# Patient Record
Sex: MALE | Race: BLACK OR AFRICAN AMERICAN | Employment: OTHER | URBAN - METROPOLITAN AREA
[De-identification: names, ages, dates, MRNs, and addresses within clinical notes are randomized per-mention and may not be internally consistent; named-entity substitution may affect disease eponyms.]

---

## 2019-11-01 ENCOUNTER — OP HISTORICAL/CONVERTED ENCOUNTER (OUTPATIENT)
Dept: OTHER | Age: 39
End: 2019-11-01

## 2020-07-28 ENCOUNTER — OFFICE VISIT (OUTPATIENT)
Dept: ENT CLINIC | Age: 40
End: 2020-07-28
Payer: MEDICARE

## 2020-07-28 VITALS
HEART RATE: 68 BPM | TEMPERATURE: 98 F | BODY MASS INDEX: 20.89 KG/M2 | HEIGHT: 66 IN | RESPIRATION RATE: 18 BRPM | SYSTOLIC BLOOD PRESSURE: 128 MMHG | DIASTOLIC BLOOD PRESSURE: 80 MMHG | WEIGHT: 130 LBS | OXYGEN SATURATION: 97 %

## 2020-07-28 DIAGNOSIS — I15.1 HYPERTENSION SECONDARY TO OTHER RENAL DISORDERS: ICD-10-CM

## 2020-07-28 DIAGNOSIS — R04.0 EPISTAXIS: ICD-10-CM

## 2020-07-28 DIAGNOSIS — N17.9 AKI (ACUTE KIDNEY INJURY) (HCC): Primary | ICD-10-CM

## 2020-07-28 DIAGNOSIS — N28.89 HYPERTENSION SECONDARY TO OTHER RENAL DISORDERS: ICD-10-CM

## 2020-07-28 PROCEDURE — 99202 OFFICE O/P NEW SF 15 MIN: CPT | Performed by: OTOLARYNGOLOGY

## 2020-07-28 NOTE — PROGRESS NOTES
Subjective:  
 Donna Red 37 y.o.  
1980 HPI Chief Complaint New Patient (nose packing) Patient presents for evaluation of bleeding from the right nostril. Onset of symptoms was abrupt, with rapidly improving since that time. It has been episodic in nature. The bleeding started >24 hours ago. There is not history of prior nosebleeds. The patient denies any bleeding problems. The patient does not take ASA and/or anticoagulants. Packing placed by [facility] 2 days ago no bleeding since then. He is on HD. Review of Systems Review of Systems Constitutional: Positive for malaise/fatigue. Negative for chills and fever. HENT: Negative for ear pain, hearing loss, nosebleeds and tinnitus. Eyes: Negative for blurred vision and double vision. Respiratory: Negative for cough, sputum production and shortness of breath. Cardiovascular: Negative for chest pain and palpitations. Gastrointestinal: Negative for heartburn, nausea and vomiting. Musculoskeletal: Negative for joint pain and neck pain. Skin: Negative. Neurological: Negative for dizziness, speech change, weakness and headaches. Endo/Heme/Allergies: Negative for environmental allergies. Bruises/bleeds easily. Psychiatric/Behavioral: Negative for memory loss and substance abuse. The patient does not have insomnia. All other systems reviewed and are negative. Past Medical History:  
Diagnosis Date  Dialysis patient [facility])  Hypertension  Kidney damage Past Surgical History:  
Procedure Laterality Date  HX OTHER SURGICAL    
 kidney removed Family History Problem Relation Age of Onset  Hypertension Mother  Hypertension Father Social History Tobacco Use  Smoking status: Current Every Day Smoker Packs/day: 0.50 Years: 7.00 Pack years: 3.50  Smokeless tobacco: Never Used Substance Use Topics  Alcohol use: Yes Alcohol/week: 11.7 standard drinks Types: 14 Cans of beer per week Comment: 2 beers a day Current Outpatient Medications Medication Instructions  chlorthalidone (HYGROTEN) 25 mg, DAILY  cloNIDine HCL (CATAPRES) 0.3 mg, Oral, EVERY 8 HOURS  
 HYDROcodone-acetaminophen (NORCO) 5-325 mg per tablet 1 Tab, Oral, EVERY 8 HOURS AS NEEDED  
 labetaloL (NORMODYNE) 100 mg, Oral, EVERY 12 HOURS  lisinopriL (PRINIVIL, ZESTRIL) 20 mg, DAILY  NIFEdipine ER (PROCARDIA XL) 90 mg, DAILY  NIFEdipine ER (PROCARDIA XL) 90 mg, Oral, DAILY  spironolactone (ALDACTONE) 25 mg, Oral, 2 TIMES DAILY No Known Allergies Objective:  
 
Visit Vitals /80 (BP 1 Location: Left arm, BP Patient Position: Sitting) Pulse 68 Temp 98 °F (36.7 °C) (Oral) Resp 18 Ht 5' 6\" (1.676 m) Wt 130 lb (59 kg) SpO2 97% BMI 20.98 kg/m² Physical Exam 
Vitals signs reviewed. Constitutional:   
   Appearance: Normal appearance. He is normal weight. HENT:  
   Head: Normocephalic and atraumatic. Jaw: There is normal jaw occlusion. No trismus or malocclusion. Salivary Glands: Right salivary gland is not diffusely enlarged or tender. Left salivary gland is not diffusely enlarged or tender. Right Ear: Hearing, tympanic membrane, ear canal and external ear normal.  
   Left Ear: Hearing, tympanic membrane, ear canal and external ear normal.  
   Ears:  
   Etienne exam findings: does not lateralize. Right Rinne: AC > BC. Left Rinne: AC > BC. Nose: No nasal deformity or septal deviation. Right Nostril: Epistaxis (packing in place - removed; cottonball/afrin placed for mild ooze) present. Right Turbinates: Not enlarged. Left Turbinates: Not enlarged. Mouth/Throat:  
   Mouth: Mucous membranes are moist. No injury. Tongue: No lesions. Palate: No mass. Tonsils: No tonsillar exudate or tonsillar abscesses. Eyes:  
   Extraocular Movements: Extraocular movements intact. Pupils: Pupils are equal, round, and reactive to light. Neck: Musculoskeletal: Normal range of motion. No edema or erythema. Thyroid: No thyroid mass, thyromegaly or thyroid tenderness. Trachea: Trachea and phonation normal. No tracheal tenderness. Cardiovascular:  
   Rate and Rhythm: Normal rate and regular rhythm. Pulmonary:  
   Effort: Pulmonary effort is normal.  
   Breath sounds: Normal breath sounds. No stridor. No wheezing. Musculoskeletal: Normal range of motion. Lymphadenopathy:  
   Cervical: No cervical adenopathy. Skin: 
   General: Skin is warm and dry. Neurological:  
   General: No focal deficit present. Mental Status: He is alert and oriented to person, place, and time. Mental status is at baseline. Psychiatric:     
   Mood and Affect: Mood normal.     
   Behavior: Behavior normal.  
 
 
 
Assessment/Plan:  
 
Encounter Diagnoses Name Primary?  TUNDE (acute kidney injury) ([facility] [address] Yes  Hypertension secondary to other renal disorders  Epistaxis Packing removed - some ooze noted, controlled with Afrin on cottonball pt will remove @ home in 1-2 hours Nosebleed handout given Fu prn No orders of the defined types were placed in this encounter.

## 2021-01-01 ENCOUNTER — APPOINTMENT (OUTPATIENT)
Dept: ENDOSCOPY | Age: 41
End: 2021-01-01
Attending: INTERNAL MEDICINE
Payer: MEDICARE

## 2021-01-01 ENCOUNTER — HOSPITAL ENCOUNTER (OUTPATIENT)
Dept: PREADMISSION TESTING | Age: 41
Discharge: HOME | End: 2021-07-19
Payer: MEDICARE

## 2021-01-01 ENCOUNTER — APPOINTMENT (OUTPATIENT)
Dept: GENERAL RADIOLOGY | Age: 41
End: 2021-01-01
Attending: EMERGENCY MEDICINE
Payer: MEDICARE

## 2021-01-01 ENCOUNTER — HOSPITAL ENCOUNTER (OUTPATIENT)
Dept: MRI IMAGING | Age: 41
Discharge: HOME | End: 2021-10-26
Attending: INTERNAL MEDICINE
Payer: MEDICARE

## 2021-01-01 ENCOUNTER — HOSPITAL ENCOUNTER (EMERGENCY)
Age: 41
Discharge: HOME | End: 2021-07-03
Attending: EMERGENCY MEDICINE
Payer: MEDICARE

## 2021-01-01 ENCOUNTER — OFFICE VISIT (OUTPATIENT)
Dept: PRIMARY CARE CLINIC | Age: 41
End: 2021-01-01
Payer: MEDICARE

## 2021-01-01 ENCOUNTER — ANESTHESIA EVENT (OUTPATIENT)
Dept: ENDOSCOPY | Age: 41
End: 2021-01-01
Payer: MEDICARE

## 2021-01-01 ENCOUNTER — HOSPITAL ENCOUNTER (OUTPATIENT)
Age: 41
Setting detail: OBSERVATION
Discharge: LEFT BEFORE DISCHARGE | End: 2021-07-22
Attending: EMERGENCY MEDICINE | Admitting: INTERNAL MEDICINE
Payer: MEDICARE

## 2021-01-01 ENCOUNTER — HOSPITAL ENCOUNTER (EMERGENCY)
Age: 41
Discharge: HOME | End: 2021-07-15
Attending: EMERGENCY MEDICINE
Payer: MEDICARE

## 2021-01-01 ENCOUNTER — HOSPITAL ENCOUNTER (EMERGENCY)
Age: 41
Discharge: HOME | End: 2021-06-21
Attending: EMERGENCY MEDICINE
Payer: MEDICARE

## 2021-01-01 ENCOUNTER — HOSPITAL ENCOUNTER (OUTPATIENT)
Dept: PREADMISSION TESTING | Age: 41
Discharge: HOME | End: 2021-10-14
Payer: MEDICARE

## 2021-01-01 ENCOUNTER — PATIENT OUTREACH (OUTPATIENT)
Dept: CASE MANAGEMENT | Age: 41
End: 2021-01-01

## 2021-01-01 ENCOUNTER — HOSPITAL ENCOUNTER (OUTPATIENT)
Age: 41
Setting detail: OUTPATIENT SURGERY
Discharge: HOME | End: 2021-05-28
Attending: INTERNAL MEDICINE | Admitting: INTERNAL MEDICINE

## 2021-01-01 ENCOUNTER — TRANSCRIBE ORDER (OUTPATIENT)
Dept: SCHEDULING | Age: 41
End: 2021-01-01

## 2021-01-01 ENCOUNTER — HOSPITAL ENCOUNTER (OUTPATIENT)
Age: 41
Setting detail: OUTPATIENT SURGERY
Discharge: HOME | End: 2021-07-23
Attending: INTERNAL MEDICINE | Admitting: INTERNAL MEDICINE
Payer: MEDICARE

## 2021-01-01 ENCOUNTER — ANESTHESIA (OUTPATIENT)
Dept: ENDOSCOPY | Age: 41
End: 2021-01-01
Payer: MEDICARE

## 2021-01-01 ENCOUNTER — HOSPITAL ENCOUNTER (OUTPATIENT)
Age: 41
Setting detail: OBSERVATION
Discharge: LEFT BEFORE DISCHARGE | End: 2021-02-06
Attending: EMERGENCY MEDICINE | Admitting: INTERNAL MEDICINE
Payer: MEDICARE

## 2021-01-01 ENCOUNTER — TRANSCRIBE ORDER (OUTPATIENT)
Dept: REGISTRATION | Age: 41
End: 2021-01-01

## 2021-01-01 ENCOUNTER — HOSPITAL ENCOUNTER (OUTPATIENT)
Age: 41
Setting detail: OUTPATIENT SURGERY
Discharge: HOME | End: 2021-10-18
Attending: INTERNAL MEDICINE | Admitting: INTERNAL MEDICINE
Payer: MEDICARE

## 2021-01-01 ENCOUNTER — HOSPITAL ENCOUNTER (EMERGENCY)
Age: 41
Discharge: HOME | End: 2021-07-11
Attending: EMERGENCY MEDICINE
Payer: MEDICARE

## 2021-01-01 ENCOUNTER — HOSPITAL ENCOUNTER (OUTPATIENT)
Dept: PREADMISSION TESTING | Age: 41
Discharge: HOME | End: 2021-05-24
Payer: MEDICARE

## 2021-01-01 ENCOUNTER — APPOINTMENT (OUTPATIENT)
Dept: ENDOSCOPY | Age: 41
End: 2021-01-01
Attending: INTERNAL MEDICINE

## 2021-01-01 ENCOUNTER — HOSPITAL ENCOUNTER (OUTPATIENT)
Dept: LAB | Age: 41
Discharge: HOME | End: 2021-10-26
Attending: INTERNAL MEDICINE
Payer: MEDICARE

## 2021-01-01 VITALS
RESPIRATION RATE: 18 BRPM | TEMPERATURE: 97.6 F | HEIGHT: 68 IN | DIASTOLIC BLOOD PRESSURE: 116 MMHG | WEIGHT: 125.6 LBS | HEART RATE: 78 BPM | OXYGEN SATURATION: 99 % | SYSTOLIC BLOOD PRESSURE: 171 MMHG | BODY MASS INDEX: 19.04 KG/M2

## 2021-01-01 VITALS
SYSTOLIC BLOOD PRESSURE: 149 MMHG | OXYGEN SATURATION: 98 % | DIASTOLIC BLOOD PRESSURE: 102 MMHG | HEART RATE: 83 BPM | RESPIRATION RATE: 16 BRPM | TEMPERATURE: 98.2 F

## 2021-01-01 VITALS
BODY MASS INDEX: 23.32 KG/M2 | DIASTOLIC BLOOD PRESSURE: 109 MMHG | TEMPERATURE: 99.4 F | OXYGEN SATURATION: 100 % | HEART RATE: 103 BPM | SYSTOLIC BLOOD PRESSURE: 167 MMHG | HEIGHT: 65 IN | WEIGHT: 140 LBS | RESPIRATION RATE: 18 BRPM

## 2021-01-01 VITALS
SYSTOLIC BLOOD PRESSURE: 138 MMHG | OXYGEN SATURATION: 98 % | BODY MASS INDEX: 20.3 KG/M2 | HEART RATE: 73 BPM | WEIGHT: 122 LBS | RESPIRATION RATE: 18 BRPM | DIASTOLIC BLOOD PRESSURE: 87 MMHG | TEMPERATURE: 97.5 F

## 2021-01-01 VITALS
HEART RATE: 71 BPM | OXYGEN SATURATION: 100 % | RESPIRATION RATE: 18 BRPM | WEIGHT: 125.6 LBS | BODY MASS INDEX: 19.04 KG/M2 | TEMPERATURE: 97.3 F | DIASTOLIC BLOOD PRESSURE: 92 MMHG | HEIGHT: 68 IN | SYSTOLIC BLOOD PRESSURE: 149 MMHG

## 2021-01-01 VITALS
HEIGHT: 65 IN | OXYGEN SATURATION: 100 % | WEIGHT: 135 LBS | BODY MASS INDEX: 22.49 KG/M2 | HEART RATE: 73 BPM | RESPIRATION RATE: 18 BRPM | DIASTOLIC BLOOD PRESSURE: 89 MMHG | SYSTOLIC BLOOD PRESSURE: 123 MMHG | TEMPERATURE: 97.6 F

## 2021-01-01 VITALS
TEMPERATURE: 98.3 F | SYSTOLIC BLOOD PRESSURE: 135 MMHG | OXYGEN SATURATION: 98 % | HEART RATE: 78 BPM | RESPIRATION RATE: 18 BRPM | BODY MASS INDEX: 20.18 KG/M2 | WEIGHT: 125 LBS | DIASTOLIC BLOOD PRESSURE: 89 MMHG

## 2021-01-01 VITALS
SYSTOLIC BLOOD PRESSURE: 154 MMHG | DIASTOLIC BLOOD PRESSURE: 100 MMHG | OXYGEN SATURATION: 98 % | RESPIRATION RATE: 14 BRPM | TEMPERATURE: 97.2 F | WEIGHT: 122 LBS | HEART RATE: 63 BPM | BODY MASS INDEX: 19.61 KG/M2 | HEIGHT: 66 IN

## 2021-01-01 VITALS
RESPIRATION RATE: 18 BRPM | HEART RATE: 96 BPM | BODY MASS INDEX: 22.49 KG/M2 | DIASTOLIC BLOOD PRESSURE: 101 MMHG | TEMPERATURE: 99 F | OXYGEN SATURATION: 99 % | WEIGHT: 135 LBS | SYSTOLIC BLOOD PRESSURE: 149 MMHG | HEIGHT: 65 IN

## 2021-01-01 VITALS
RESPIRATION RATE: 16 BRPM | SYSTOLIC BLOOD PRESSURE: 131 MMHG | HEIGHT: 66 IN | HEART RATE: 77 BPM | WEIGHT: 124.5 LBS | DIASTOLIC BLOOD PRESSURE: 74 MMHG | OXYGEN SATURATION: 97 % | TEMPERATURE: 98 F | BODY MASS INDEX: 20.01 KG/M2

## 2021-01-01 VITALS
TEMPERATURE: 98.1 F | RESPIRATION RATE: 16 BRPM | SYSTOLIC BLOOD PRESSURE: 151 MMHG | DIASTOLIC BLOOD PRESSURE: 92 MMHG | OXYGEN SATURATION: 100 % | HEART RATE: 89 BPM

## 2021-01-01 DIAGNOSIS — C15.9 ESOPHAGEAL CANCER (HCC): Primary | ICD-10-CM

## 2021-01-01 DIAGNOSIS — R07.89 CHEST WALL PAIN: Primary | ICD-10-CM

## 2021-01-01 DIAGNOSIS — N18.6 ESRD (END STAGE RENAL DISEASE) ON DIALYSIS (HCC): ICD-10-CM

## 2021-01-01 DIAGNOSIS — K21.9 GASTROESOPHAGEAL REFLUX DISEASE WITHOUT ESOPHAGITIS: ICD-10-CM

## 2021-01-01 DIAGNOSIS — R10.13 EPIGASTRIC ABDOMINAL PAIN: ICD-10-CM

## 2021-01-01 DIAGNOSIS — I10 ESSENTIAL HYPERTENSION: ICD-10-CM

## 2021-01-01 DIAGNOSIS — C15.9: Primary | ICD-10-CM

## 2021-01-01 DIAGNOSIS — D64.9 SEVERE ANEMIA: Primary | ICD-10-CM

## 2021-01-01 DIAGNOSIS — N18.6 STAGE 5 CHRONIC KIDNEY DISEASE ON CHRONIC DIALYSIS (HCC): ICD-10-CM

## 2021-01-01 DIAGNOSIS — D64.9 ANEMIA, UNSPECIFIED TYPE: Primary | ICD-10-CM

## 2021-01-01 DIAGNOSIS — R10.13 ABDOMINAL PAIN, EPIGASTRIC: ICD-10-CM

## 2021-01-01 DIAGNOSIS — Z99.2 ESRD (END STAGE RENAL DISEASE) ON DIALYSIS (HCC): ICD-10-CM

## 2021-01-01 DIAGNOSIS — D64.9 SYMPTOMATIC ANEMIA: ICD-10-CM

## 2021-01-01 DIAGNOSIS — R11.2 NON-INTRACTABLE VOMITING WITH NAUSEA, UNSPECIFIED VOMITING TYPE: Primary | ICD-10-CM

## 2021-01-01 DIAGNOSIS — D49.0 TUMOR OF ESOPHAGUS: ICD-10-CM

## 2021-01-01 DIAGNOSIS — Z71.89 ACP (ADVANCE CARE PLANNING): ICD-10-CM

## 2021-01-01 DIAGNOSIS — C15.9 ESOPHAGEAL CANCER (HCC): ICD-10-CM

## 2021-01-01 DIAGNOSIS — R07.89 CHEST WALL PAIN: ICD-10-CM

## 2021-01-01 DIAGNOSIS — F17.200 TOBACCO DEPENDENCE: ICD-10-CM

## 2021-01-01 DIAGNOSIS — Z09 HOSPITAL DISCHARGE FOLLOW-UP: Primary | ICD-10-CM

## 2021-01-01 DIAGNOSIS — C15.9: ICD-10-CM

## 2021-01-01 DIAGNOSIS — Z00.00 MEDICARE ANNUAL WELLNESS VISIT, SUBSEQUENT: ICD-10-CM

## 2021-01-01 DIAGNOSIS — Z99.2 STAGE 5 CHRONIC KIDNEY DISEASE ON CHRONIC DIALYSIS (HCC): ICD-10-CM

## 2021-01-01 DIAGNOSIS — I10 ESSENTIAL HYPERTENSION: Primary | ICD-10-CM

## 2021-01-01 LAB
ABO + RH BLD: NORMAL
ALBUMIN SERPL-MCNC: 2.7 G/DL (ref 3.5–5)
ALBUMIN SERPL-MCNC: 2.9 G/DL (ref 3.5–5)
ALBUMIN SERPL-MCNC: 3.3 G/DL (ref 3.5–5)
ALBUMIN SERPL-MCNC: 3.3 G/DL (ref 3.5–5)
ALBUMIN SERPL-MCNC: 3.7 G/DL (ref 3.5–5)
ALBUMIN/GLOB SERPL: 0.9 {RATIO} (ref 1.1–2.2)
ALBUMIN/GLOB SERPL: 0.9 {RATIO} (ref 1.1–2.2)
ALBUMIN/GLOB SERPL: 1 {RATIO} (ref 1.1–2.2)
ALP SERPL-CCNC: 57 U/L (ref 45–117)
ALP SERPL-CCNC: 61 U/L (ref 45–117)
ALP SERPL-CCNC: 66 U/L (ref 45–117)
ALP SERPL-CCNC: 67 U/L (ref 45–117)
ALP SERPL-CCNC: 79 U/L (ref 45–117)
ALT SERPL-CCNC: 12 U/L (ref 12–78)
ALT SERPL-CCNC: 6 U/L (ref 12–78)
ALT SERPL-CCNC: 7 U/L (ref 12–78)
ALT SERPL-CCNC: 9 U/L (ref 12–78)
ALT SERPL-CCNC: <6 U/L (ref 12–78)
ANION GAP SERPL CALC-SCNC: 11 MMOL/L (ref 5–15)
ANION GAP SERPL CALC-SCNC: 13 MMOL/L (ref 5–15)
ANION GAP SERPL CALC-SCNC: 14 MMOL/L (ref 5–15)
ANION GAP SERPL CALC-SCNC: 6 MMOL/L (ref 5–15)
ANION GAP SERPL CALC-SCNC: 7 MMOL/L (ref 5–15)
ANION GAP SERPL CALC-SCNC: 8 MMOL/L (ref 5–15)
AST SERPL W P-5'-P-CCNC: 11 U/L (ref 15–37)
AST SERPL W P-5'-P-CCNC: 13 U/L (ref 15–37)
AST SERPL W P-5'-P-CCNC: 14 U/L (ref 15–37)
AST SERPL W P-5'-P-CCNC: 6 U/L (ref 15–37)
AST SERPL W P-5'-P-CCNC: 8 U/L (ref 15–37)
ATRIAL RATE: 104 BPM
ATRIAL RATE: 77 BPM
ATRIAL RATE: 78 BPM
ATRIAL RATE: 91 BPM
ATRIAL RATE: 96 BPM
BASOPHILS # BLD: 0 K/UL (ref 0–0.2)
BASOPHILS # BLD: 0 K/UL (ref 0–0.2)
BASOPHILS # BLD: 0.1 K/UL (ref 0–0.2)
BASOPHILS NFR BLD: 0 % (ref 0–2.5)
BASOPHILS NFR BLD: 1 % (ref 0–2.5)
BILIRUB SERPL-MCNC: 0.3 MG/DL (ref 0.2–1)
BILIRUB SERPL-MCNC: 0.4 MG/DL (ref 0.2–1)
BILIRUB SERPL-MCNC: 0.4 MG/DL (ref 0.2–1)
BILIRUB SERPL-MCNC: 0.5 MG/DL (ref 0.2–1)
BILIRUB SERPL-MCNC: 0.6 MG/DL (ref 0.2–1)
BLD PROD TYP BPU: NORMAL
BLOOD GROUP ANTIBODIES SERPL: NEGATIVE
BNP SERPL-MCNC: 3847 PG/ML
BNP SERPL-MCNC: 7293 PG/ML
BPU ID: NORMAL
BUN SERPL-MCNC: 23 MG/DL (ref 6–20)
BUN SERPL-MCNC: 38 MG/DL (ref 6–20)
BUN SERPL-MCNC: 44 MG/DL (ref 6–20)
BUN SERPL-MCNC: 45 MG/DL (ref 6–20)
BUN SERPL-MCNC: 62 MG/DL (ref 6–20)
BUN SERPL-MCNC: 71 MG/DL (ref 6–20)
BUN/CREAT SERPL: 3 (ref 12–20)
BUN/CREAT SERPL: 4 (ref 12–20)
CA-I BLD-MCNC: 8.7 MG/DL (ref 8.5–10.1)
CA-I BLD-MCNC: 8.7 MG/DL (ref 8.5–10.1)
CA-I BLD-MCNC: 9.1 MG/DL (ref 8.5–10.1)
CA-I BLD-MCNC: 9.6 MG/DL (ref 8.5–10.1)
CA-I BLD-MCNC: 9.7 MG/DL (ref 8.5–10.1)
CA-I BLD-MCNC: 9.8 MG/DL (ref 8.5–10.1)
CALCULATED P AXIS, ECG09: 32 DEGREES
CALCULATED P AXIS, ECG09: 34 DEGREES
CALCULATED P AXIS, ECG09: 37 DEGREES
CALCULATED P AXIS, ECG09: 55 DEGREES
CALCULATED P AXIS, ECG09: 64 DEGREES
CALCULATED R AXIS, ECG10: -13 DEGREES
CALCULATED R AXIS, ECG10: -18 DEGREES
CALCULATED R AXIS, ECG10: -19 DEGREES
CALCULATED R AXIS, ECG10: -3 DEGREES
CALCULATED R AXIS, ECG10: 18 DEGREES
CALCULATED T AXIS, ECG11: 56 DEGREES
CALCULATED T AXIS, ECG11: 58 DEGREES
CALCULATED T AXIS, ECG11: 67 DEGREES
CALCULATED T AXIS, ECG11: 75 DEGREES
CALCULATED T AXIS, ECG11: 87 DEGREES
CHLORIDE SERPL-SCNC: 100 MMOL/L (ref 97–108)
CHLORIDE SERPL-SCNC: 102 MMOL/L (ref 97–108)
CHLORIDE SERPL-SCNC: 95 MMOL/L (ref 97–108)
CHLORIDE SERPL-SCNC: 96 MMOL/L (ref 97–108)
CHLORIDE SERPL-SCNC: 96 MMOL/L (ref 97–108)
CHLORIDE SERPL-SCNC: 99 MMOL/L (ref 97–108)
CO2 SERPL-SCNC: 24 MMOL/L (ref 21–32)
CO2 SERPL-SCNC: 27 MMOL/L (ref 21–32)
CO2 SERPL-SCNC: 29 MMOL/L (ref 21–32)
CO2 SERPL-SCNC: 31 MMOL/L (ref 21–32)
CO2 SERPL-SCNC: 31 MMOL/L (ref 21–32)
CO2 SERPL-SCNC: 34 MMOL/L (ref 21–32)
COVID-19 RAPID TEST, COVR: ABNORMAL
CREAT SERPL-MCNC: 11.18 MG/DL (ref 0.7–1.3)
CREAT SERPL-MCNC: 12.59 MG/DL (ref 0.7–1.3)
CREAT SERPL-MCNC: 13.5 MG/DL (ref 0.7–1.3)
CREAT SERPL-MCNC: 16.29 MG/DL (ref 0.7–1.3)
CREAT SERPL-MCNC: 17.68 MG/DL (ref 0.7–1.3)
CREAT SERPL-MCNC: 8.57 MG/DL (ref 0.7–1.3)
CREAT SERPL-MCNC: 9.19 MG/DL (ref 0.7–1.3)
CROSSMATCH RESULT,%XM: NORMAL
DIAGNOSIS, 93000: NORMAL
EOSINOPHIL # BLD: 0 K/UL (ref 0–0.7)
EOSINOPHIL # BLD: 0.1 K/UL (ref 0–0.7)
EOSINOPHIL NFR BLD: 0 % (ref 0.9–2.9)
EOSINOPHIL NFR BLD: 0 % (ref 0.9–2.9)
EOSINOPHIL NFR BLD: 1 % (ref 0.9–2.9)
ERYTHROCYTE [DISTWIDTH] IN BLOOD BY AUTOMATED COUNT: 14.4 % (ref 11.5–14.5)
ERYTHROCYTE [DISTWIDTH] IN BLOOD BY AUTOMATED COUNT: 15.3 % (ref 11.5–14.5)
ERYTHROCYTE [DISTWIDTH] IN BLOOD BY AUTOMATED COUNT: 15.8 % (ref 11.5–14.5)
ERYTHROCYTE [DISTWIDTH] IN BLOOD BY AUTOMATED COUNT: 16.6 % (ref 11.5–14.5)
ERYTHROCYTE [DISTWIDTH] IN BLOOD BY AUTOMATED COUNT: 17 % (ref 11.5–14.5)
ERYTHROCYTE [DISTWIDTH] IN BLOOD BY AUTOMATED COUNT: 17.1 % (ref 11.5–14.5)
ERYTHROCYTE [DISTWIDTH] IN BLOOD BY AUTOMATED COUNT: 20.9 % (ref 11.5–14.5)
GLOBULIN SER CALC-MCNC: 3 G/DL (ref 2–4)
GLOBULIN SER CALC-MCNC: 3.4 G/DL (ref 2–4)
GLOBULIN SER CALC-MCNC: 3.8 G/DL (ref 2–4)
GLUCOSE SERPL-MCNC: 102 MG/DL (ref 65–100)
GLUCOSE SERPL-MCNC: 104 MG/DL (ref 65–100)
GLUCOSE SERPL-MCNC: 125 MG/DL (ref 65–100)
GLUCOSE SERPL-MCNC: 83 MG/DL (ref 65–100)
GLUCOSE SERPL-MCNC: 93 MG/DL (ref 65–100)
GLUCOSE SERPL-MCNC: 99 MG/DL (ref 65–100)
HCT VFR BLD AUTO: 16.5 % (ref 41–53)
HCT VFR BLD AUTO: 16.9 % (ref 41–53)
HCT VFR BLD AUTO: 18.2 % (ref 41–53)
HCT VFR BLD AUTO: 20.7 % (ref 41–53)
HCT VFR BLD AUTO: 21.9 % (ref 41–53)
HCT VFR BLD AUTO: 24.3 % (ref 41–53)
HCT VFR BLD AUTO: 26.9 % (ref 41–53)
HGB BLD-MCNC: 5.7 G/DL (ref 13.5–17.5)
HGB BLD-MCNC: 5.7 G/DL (ref 13–16)
HGB BLD-MCNC: 6.1 G/DL (ref 13.5–17.5)
HGB BLD-MCNC: 7.2 G/DL (ref 13.5–17.5)
HGB BLD-MCNC: 7.3 G/DL (ref 13.5–17.5)
HGB BLD-MCNC: 8.4 G/DL (ref 13.5–17.5)
HGB BLD-MCNC: 9.2 G/DL (ref 13.5–17.5)
INR PPP: 1.1 (ref 0.9–1.1)
INR PPP: 1.2 (ref 0.9–1.1)
LIPASE SERPL-CCNC: 124 U/L (ref 73–393)
LIPASE SERPL-CCNC: 161 U/L (ref 73–393)
LYMPHOCYTES # BLD: 0.6 K/UL (ref 1–4.8)
LYMPHOCYTES # BLD: 0.8 K/UL (ref 1–4.8)
LYMPHOCYTES # BLD: 1.2 K/UL (ref 1–4.8)
LYMPHOCYTES # BLD: 1.2 K/UL (ref 1–4.8)
LYMPHOCYTES # BLD: 1.5 K/UL (ref 1–4.8)
LYMPHOCYTES # BLD: 1.9 K/UL (ref 1–4.8)
LYMPHOCYTES NFR BLD: 14 % (ref 20.5–51.1)
LYMPHOCYTES NFR BLD: 15 % (ref 20.5–51.1)
LYMPHOCYTES NFR BLD: 20 % (ref 20.5–51.1)
LYMPHOCYTES NFR BLD: 28 % (ref 20.5–51.1)
LYMPHOCYTES NFR BLD: 32 % (ref 20.5–51.1)
LYMPHOCYTES NFR BLD: 8 % (ref 20.5–51.1)
MAGNESIUM SERPL-MCNC: 2.1 MG/DL (ref 1.6–2.4)
MAGNESIUM SERPL-MCNC: 2.7 MG/DL (ref 1.6–2.4)
MCH RBC QN AUTO: 31.7 PG (ref 31–34)
MCH RBC QN AUTO: 32 PG (ref 31–34)
MCH RBC QN AUTO: 32.8 PG (ref 31–34)
MCH RBC QN AUTO: 33.1 PG (ref 31–34)
MCH RBC QN AUTO: 33.3 PG (ref 31–34)
MCH RBC QN AUTO: 33.4 PG (ref 31–34)
MCH RBC QN AUTO: 33.7 PG (ref 31–34)
MCHC RBC AUTO-ENTMCNC: 33 G/DL (ref 31–36)
MCHC RBC AUTO-ENTMCNC: 33.5 G/DL (ref 31–36)
MCHC RBC AUTO-ENTMCNC: 33.8 G/DL (ref 31–36)
MCHC RBC AUTO-ENTMCNC: 34.3 G/DL (ref 31–36)
MCHC RBC AUTO-ENTMCNC: 34.6 G/DL (ref 31–36)
MCHC RBC AUTO-ENTMCNC: 34.7 G/DL (ref 31–36)
MCHC RBC AUTO-ENTMCNC: 35.4 G/DL (ref 31–36)
MCV RBC AUTO: 90.3 FL (ref 80–100)
MCV RBC AUTO: 96 FL (ref 80–100)
MCV RBC AUTO: 96 FL (ref 80–100)
MCV RBC AUTO: 97 FL (ref 80–100)
MCV RBC AUTO: 97.6 FL (ref 80–100)
MCV RBC AUTO: 97.7 FL (ref 80–100)
MCV RBC AUTO: 97.8 FL (ref 80–100)
MONOCYTES # BLD: 0.2 K/UL (ref 0.2–2.4)
MONOCYTES # BLD: 0.2 K/UL (ref 0–0.8)
MONOCYTES # BLD: 0.3 K/UL (ref 0.2–2.4)
MONOCYTES # BLD: 0.4 K/UL (ref 0.2–2.4)
MONOCYTES # BLD: 0.5 K/UL (ref 0.2–2.4)
MONOCYTES # BLD: 0.5 K/UL (ref 0.2–2.4)
MONOCYTES NFR BLD: 3 % (ref 1.7–9.3)
MONOCYTES NFR BLD: 4 % (ref 3.1–13.9)
MONOCYTES NFR BLD: 5 % (ref 1.7–9.3)
MONOCYTES NFR BLD: 7 % (ref 1.7–9.3)
MONOCYTES NFR BLD: 8 % (ref 1.7–9.3)
MONOCYTES NFR BLD: 8 % (ref 1.7–9.3)
NEUTS SEG # BLD: 3.3 K/UL (ref 1.8–7.7)
NEUTS SEG # BLD: 3.5 K/UL (ref 1.8–7.7)
NEUTS SEG # BLD: 4.8 K/UL (ref 1.8–7.7)
NEUTS SEG # BLD: 5 K/UL (ref 1.8–7.7)
NEUTS SEG # BLD: 5.8 K/UL (ref 1.8–7.7)
NEUTS SEG # BLD: 6.3 K/UL (ref 1.8–7.7)
NEUTS SEG NFR BLD: 58 % (ref 42–75)
NEUTS SEG NFR BLD: 62 % (ref 42–75)
NEUTS SEG NFR BLD: 75 % (ref 42–75)
NEUTS SEG NFR BLD: 76 % (ref 42–75)
NEUTS SEG NFR BLD: 80 % (ref 42–75)
NEUTS SEG NFR BLD: 88 % (ref 42–75)
NRBC # BLD: 0 K/UL
NRBC # BLD: 0.01 K/UL
NRBC # BLD: 0.01 K/UL
NRBC BLD-RTO: 0 PER 100 WBC
NRBC BLD-RTO: 0.1 PER 100 WBC
NRBC BLD-RTO: 0.1 PER 100 WBC
P-R INTERVAL, ECG05: 154 MS
P-R INTERVAL, ECG05: 159 MS
P-R INTERVAL, ECG05: 161 MS
P-R INTERVAL, ECG05: 164 MS
P-R INTERVAL, ECG05: 176 MS
PLATELET # BLD AUTO: 201 K/UL (ref 150–400)
PLATELET # BLD AUTO: 212 K/UL (ref 150–400)
PLATELET # BLD AUTO: 216 K/UL
PLATELET # BLD AUTO: 220 K/UL (ref 150–400)
PLATELET # BLD AUTO: 230 K/UL
PLATELET # BLD AUTO: 238 K/UL (ref 150–400)
PLATELET # BLD AUTO: 267 K/UL
PMV BLD AUTO: 7.3 FL (ref 6.5–11.5)
PMV BLD AUTO: 7.5 FL (ref 6.5–11.5)
PMV BLD AUTO: 7.7 FL (ref 6.5–11.5)
PMV BLD AUTO: 7.9 FL (ref 6.5–11.5)
PMV BLD AUTO: 8 FL (ref 6.5–11.5)
PMV BLD AUTO: 8.3 FL (ref 6.5–11.5)
PMV BLD AUTO: 8.3 FL (ref 6.5–11.5)
POTASSIUM SERPL-SCNC: 3.3 MMOL/L (ref 3.5–5.1)
POTASSIUM SERPL-SCNC: 3.4 MMOL/L (ref 3.5–5.1)
POTASSIUM SERPL-SCNC: 3.5 MMOL/L (ref 3.5–5.1)
POTASSIUM SERPL-SCNC: 3.7 MMOL/L (ref 3.5–5.1)
POTASSIUM SERPL-SCNC: 3.7 MMOL/L (ref 3.5–5.1)
POTASSIUM SERPL-SCNC: 4 MMOL/L (ref 3.5–5.1)
POTASSIUM SERPL-SCNC: 4 MMOL/L (ref 3.5–5.1)
POTASSIUM SERPL-SCNC: 4.5 MMOL/L (ref 3.5–5.1)
PROT SERPL-MCNC: 5.7 G/DL (ref 6.4–8.2)
PROT SERPL-MCNC: 6.3 G/DL (ref 6.4–8.2)
PROT SERPL-MCNC: 6.7 G/DL (ref 6.4–8.2)
PROT SERPL-MCNC: 6.7 G/DL (ref 6.4–8.2)
PROT SERPL-MCNC: 7.5 G/DL (ref 6.4–8.2)
PROTHROMBIN TIME: 11 SEC (ref 9–11.1)
PROTHROMBIN TIME: 11.4 SEC (ref 9–11.1)
Q-T INTERVAL, ECG07: 350 MS
Q-T INTERVAL, ECG07: 357 MS
Q-T INTERVAL, ECG07: 380 MS
Q-T INTERVAL, ECG07: 383 MS
Q-T INTERVAL, ECG07: 396 MS
QRS DURATION, ECG06: 86 MS
QRS DURATION, ECG06: 88 MS
QRS DURATION, ECG06: 88 MS
QRS DURATION, ECG06: 92 MS
QRS DURATION, ECG06: 93 MS
QTC CALCULATION (BEZET), ECG08: 434 MS
QTC CALCULATION (BEZET), ECG08: 452 MS
QTC CALCULATION (BEZET), ECG08: 452 MS
QTC CALCULATION (BEZET), ECG08: 461 MS
QTC CALCULATION (BEZET), ECG08: 468 MS
RBC # BLD AUTO: 1.69 M/UL
RBC # BLD AUTO: 1.73 M/UL (ref 4.5–5.9)
RBC # BLD AUTO: 1.86 M/UL (ref 4.5–5.9)
RBC # BLD AUTO: 2.28 M/UL (ref 4.5–5.9)
RBC # BLD AUTO: 2.29 M/UL (ref 4.5–5.9)
RBC # BLD AUTO: 2.53 M/UL (ref 4.5–5.9)
RBC # BLD AUTO: 2.78 M/UL (ref 4.5–5.9)
SARS-COV-2, COV2: NORMAL
SARS-COV-2, COV2NT: NOT DETECTED
SARS-COV-2, XPLCVT: NOT DETECTED
SODIUM SERPL-SCNC: 135 MMOL/L (ref 136–145)
SODIUM SERPL-SCNC: 136 MMOL/L (ref 136–145)
SODIUM SERPL-SCNC: 136 MMOL/L (ref 136–145)
SODIUM SERPL-SCNC: 137 MMOL/L (ref 136–145)
SODIUM SERPL-SCNC: 138 MMOL/L (ref 136–145)
SODIUM SERPL-SCNC: 141 MMOL/L (ref 136–145)
SOURCE, COVRS: NORMAL
SPECIMEN EXP DATE BLD: NORMAL
SPECIMEN SOURCE: ABNORMAL
STATUS OF UNIT,%ST: NORMAL
TRANSFUSION STATUS PATIENT QL: NORMAL
TROPONIN I SERPL-MCNC: <0.05 NG/ML
UNIT DIVISION, %UDIV: 0
VENTRICULAR RATE, ECG03: 104 BPM
VENTRICULAR RATE, ECG03: 77 BPM
VENTRICULAR RATE, ECG03: 78 BPM
VENTRICULAR RATE, ECG03: 91 BPM
VENTRICULAR RATE, ECG03: 96 BPM
WBC # BLD AUTO: 5.3 K/UL (ref 4.4–11.3)
WBC # BLD AUTO: 5.8 K/UL (ref 4.4–11.3)
WBC # BLD AUTO: 6 K/UL (ref 4.4–11.3)
WBC # BLD AUTO: 6.2 K/UL (ref 4.4–11.3)
WBC # BLD AUTO: 6.3 K/UL (ref 4.4–11.3)
WBC # BLD AUTO: 7.2 K/UL (ref 4.4–11.3)
WBC # BLD AUTO: 7.7 K/UL (ref 4.4–11.3)

## 2021-01-01 PROCEDURE — G8427 DOCREV CUR MEDS BY ELIG CLIN: HCPCS | Performed by: NURSE PRACTITIONER

## 2021-01-01 PROCEDURE — 99285 EMERGENCY DEPT VISIT HI MDM: CPT

## 2021-01-01 PROCEDURE — 80053 COMPREHEN METABOLIC PANEL: CPT

## 2021-01-01 PROCEDURE — 99218 HC RM OBSERVATION: CPT

## 2021-01-01 PROCEDURE — 74011250636 HC RX REV CODE- 250/636: Performed by: ANESTHESIOLOGY

## 2021-01-01 PROCEDURE — 85025 COMPLETE CBC W/AUTO DIFF WBC: CPT

## 2021-01-01 PROCEDURE — 84484 ASSAY OF TROPONIN QUANT: CPT

## 2021-01-01 PROCEDURE — G8420 CALC BMI NORM PARAMETERS: HCPCS | Performed by: NURSE PRACTITIONER

## 2021-01-01 PROCEDURE — 93005 ELECTROCARDIOGRAM TRACING: CPT

## 2021-01-01 PROCEDURE — P9016 RBC LEUKOCYTES REDUCED: HCPCS

## 2021-01-01 PROCEDURE — 85027 COMPLETE CBC AUTOMATED: CPT

## 2021-01-01 PROCEDURE — 74011250636 HC RX REV CODE- 250/636: Performed by: EMERGENCY MEDICINE

## 2021-01-01 PROCEDURE — 99283 EMERGENCY DEPT VISIT LOW MDM: CPT

## 2021-01-01 PROCEDURE — 83880 ASSAY OF NATRIURETIC PEPTIDE: CPT

## 2021-01-01 PROCEDURE — 76060000032 HC ANESTHESIA 0.5 TO 1 HR: Performed by: INTERNAL MEDICINE

## 2021-01-01 PROCEDURE — 99284 EMERGENCY DEPT VISIT MOD MDM: CPT

## 2021-01-01 PROCEDURE — 36415 COLL VENOUS BLD VENIPUNCTURE: CPT

## 2021-01-01 PROCEDURE — 71045 X-RAY EXAM CHEST 1 VIEW: CPT

## 2021-01-01 PROCEDURE — 96374 THER/PROPH/DIAG INJ IV PUSH: CPT

## 2021-01-01 PROCEDURE — 76040000007: Performed by: INTERNAL MEDICINE

## 2021-01-01 PROCEDURE — G8432 DEP SCR NOT DOC, RNG: HCPCS | Performed by: NURSE PRACTITIONER

## 2021-01-01 PROCEDURE — 74011250636 HC RX REV CODE- 250/636: Performed by: INTERNAL MEDICINE

## 2021-01-01 PROCEDURE — U0003 INFECTIOUS AGENT DETECTION BY NUCLEIC ACID (DNA OR RNA); SEVERE ACUTE RESPIRATORY SYNDROME CORONAVIRUS 2 (SARS-COV-2) (CORONAVIRUS DISEASE [COVID-19]), AMPLIFIED PROBE TECHNIQUE, MAKING USE OF HIGH THROUGHPUT TECHNOLOGIES AS DESCRIBED BY CMS-2020-01-R: HCPCS

## 2021-01-01 PROCEDURE — 99204 OFFICE O/P NEW MOD 45 MIN: CPT | Performed by: NURSE PRACTITIONER

## 2021-01-01 PROCEDURE — 86923 COMPATIBILITY TEST ELECTRIC: CPT

## 2021-01-01 PROCEDURE — 88305 TISSUE EXAM BY PATHOLOGIST: CPT

## 2021-01-01 PROCEDURE — 83735 ASSAY OF MAGNESIUM: CPT

## 2021-01-01 PROCEDURE — 74011250637 HC RX REV CODE- 250/637: Performed by: EMERGENCY MEDICINE

## 2021-01-01 PROCEDURE — 87635 SARS-COV-2 COVID-19 AMP PRB: CPT

## 2021-01-01 PROCEDURE — 36430 TRANSFUSION BLD/BLD COMPNT: CPT

## 2021-01-01 PROCEDURE — 77030019957 HC CUF BLN GASTSCP OCOA -B: Performed by: INTERNAL MEDICINE

## 2021-01-01 PROCEDURE — 74181 MRI ABDOMEN W/O CONTRAST: CPT

## 2021-01-01 PROCEDURE — 96361 HYDRATE IV INFUSION ADD-ON: CPT

## 2021-01-01 PROCEDURE — 86901 BLOOD TYPING SEROLOGIC RH(D): CPT

## 2021-01-01 PROCEDURE — G0439 PPPS, SUBSEQ VISIT: HCPCS | Performed by: NURSE PRACTITIONER

## 2021-01-01 PROCEDURE — 84132 ASSAY OF SERUM POTASSIUM: CPT

## 2021-01-01 PROCEDURE — 74011000250 HC RX REV CODE- 250: Performed by: ANESTHESIOLOGY

## 2021-01-01 PROCEDURE — 99214 OFFICE O/P EST MOD 30 MIN: CPT | Performed by: NURSE PRACTITIONER

## 2021-01-01 PROCEDURE — 82565 ASSAY OF CREATININE: CPT

## 2021-01-01 PROCEDURE — 1111F DSCHRG MED/CURRENT MED MERGE: CPT | Performed by: NURSE PRACTITIONER

## 2021-01-01 PROCEDURE — 2709999900 HC NON-CHARGEABLE SUPPLY: Performed by: INTERNAL MEDICINE

## 2021-01-01 PROCEDURE — G9231 DOC ESRD DIA TRANS PREG: HCPCS | Performed by: NURSE PRACTITIONER

## 2021-01-01 PROCEDURE — 85610 PROTHROMBIN TIME: CPT

## 2021-01-01 PROCEDURE — C9113 INJ PANTOPRAZOLE SODIUM, VIA: HCPCS | Performed by: EMERGENCY MEDICINE

## 2021-01-01 PROCEDURE — 80048 BASIC METABOLIC PNL TOTAL CA: CPT

## 2021-01-01 PROCEDURE — U0005 INFEC AGEN DETEC AMPLI PROBE: HCPCS

## 2021-01-01 PROCEDURE — 83690 ASSAY OF LIPASE: CPT

## 2021-01-01 PROCEDURE — 96376 TX/PRO/DX INJ SAME DRUG ADON: CPT

## 2021-01-01 PROCEDURE — 74011000250 HC RX REV CODE- 250: Performed by: EMERGENCY MEDICINE

## 2021-01-01 PROCEDURE — 96375 TX/PRO/DX INJ NEW DRUG ADDON: CPT

## 2021-01-01 PROCEDURE — 74011250637 HC RX REV CODE- 250/637: Performed by: INTERNAL MEDICINE

## 2021-01-01 RX ORDER — LIDOCAINE HYDROCHLORIDE 20 MG/ML
INJECTION, SOLUTION EPIDURAL; INFILTRATION; INTRACAUDAL; PERINEURAL
Status: COMPLETED
Start: 2021-01-01 | End: 2021-01-01

## 2021-01-01 RX ORDER — ACETAMINOPHEN 325 MG/1
650 TABLET ORAL
Status: DISCONTINUED | OUTPATIENT
Start: 2021-01-01 | End: 2021-01-01 | Stop reason: HOSPADM

## 2021-01-01 RX ORDER — PANTOPRAZOLE SODIUM 20 MG/1
20 TABLET, DELAYED RELEASE ORAL DAILY
Status: DISCONTINUED | OUTPATIENT
Start: 2021-01-01 | End: 2021-01-01 | Stop reason: HOSPADM

## 2021-01-01 RX ORDER — CLONIDINE HYDROCHLORIDE 0.1 MG/1
0.2 TABLET ORAL
Status: COMPLETED | OUTPATIENT
Start: 2021-01-01 | End: 2021-01-01

## 2021-01-01 RX ORDER — ACETAMINOPHEN 650 MG/1
650 SUPPOSITORY RECTAL
Status: DISCONTINUED | OUTPATIENT
Start: 2021-01-01 | End: 2021-01-01 | Stop reason: HOSPADM

## 2021-01-01 RX ORDER — METOPROLOL TARTRATE 50 MG/1
TABLET ORAL 2 TIMES DAILY
COMMUNITY

## 2021-01-01 RX ORDER — HYDROMORPHONE HYDROCHLORIDE 1 MG/ML
0.4 INJECTION, SOLUTION INTRAMUSCULAR; INTRAVENOUS; SUBCUTANEOUS
Status: CANCELLED | OUTPATIENT
Start: 2021-01-01

## 2021-01-01 RX ORDER — EPHEDRINE SULFATE/0.9% NACL/PF 50 MG/5 ML
5 SYRINGE (ML) INTRAVENOUS AS NEEDED
Status: CANCELLED | OUTPATIENT
Start: 2021-01-01

## 2021-01-01 RX ORDER — SODIUM CHLORIDE 0.9 % (FLUSH) 0.9 %
5-40 SYRINGE (ML) INJECTION EVERY 8 HOURS
Status: CANCELLED | OUTPATIENT
Start: 2021-01-01

## 2021-01-01 RX ORDER — HYDROCODONE BITARTRATE AND ACETAMINOPHEN 5; 325 MG/1; MG/1
1 TABLET ORAL
Qty: 12 TABLET | Refills: 0 | Status: SHIPPED | OUTPATIENT
Start: 2021-01-01 | End: 2021-01-01

## 2021-01-01 RX ORDER — PROPOFOL 10 MG/ML
INJECTION, EMULSION INTRAVENOUS
Status: COMPLETED
Start: 2021-01-01 | End: 2021-01-01

## 2021-01-01 RX ORDER — ONDANSETRON 4 MG/1
4 TABLET, ORALLY DISINTEGRATING ORAL
Status: DISCONTINUED | OUTPATIENT
Start: 2021-01-01 | End: 2021-01-01 | Stop reason: HOSPADM

## 2021-01-01 RX ORDER — CARVEDILOL 25 MG/1
TABLET ORAL
COMMUNITY
Start: 2021-01-01 | End: 2021-01-01 | Stop reason: ALTCHOICE

## 2021-01-01 RX ORDER — ONDANSETRON 4 MG/1
4 TABLET, ORALLY DISINTEGRATING ORAL
Status: DISCONTINUED | OUTPATIENT
Start: 2021-01-01 | End: 2021-01-01

## 2021-01-01 RX ORDER — IBUPROFEN 600 MG/1
600 TABLET ORAL
Status: COMPLETED | OUTPATIENT
Start: 2021-01-01 | End: 2021-01-01

## 2021-01-01 RX ORDER — SODIUM CHLORIDE 0.9 % (FLUSH) 0.9 %
5-40 SYRINGE (ML) INJECTION AS NEEDED
Status: DISCONTINUED | OUTPATIENT
Start: 2021-01-01 | End: 2021-01-01 | Stop reason: HOSPADM

## 2021-01-01 RX ORDER — SODIUM CHLORIDE 9 MG/ML
250 INJECTION, SOLUTION INTRAVENOUS AS NEEDED
Status: DISCONTINUED | OUTPATIENT
Start: 2021-01-01 | End: 2021-01-01 | Stop reason: HOSPADM

## 2021-01-01 RX ORDER — ONDANSETRON 4 MG/1
4 TABLET, ORALLY DISINTEGRATING ORAL
Qty: 20 TABLET | Refills: 0 | Status: ON HOLD | OUTPATIENT
Start: 2021-01-01 | End: 2021-01-01

## 2021-01-01 RX ORDER — MIDAZOLAM HYDROCHLORIDE 1 MG/ML
0.5 INJECTION, SOLUTION INTRAMUSCULAR; INTRAVENOUS
Status: CANCELLED | OUTPATIENT
Start: 2021-01-01

## 2021-01-01 RX ORDER — LISINOPRIL 20 MG/1
20 TABLET ORAL DAILY
Status: DISCONTINUED | OUTPATIENT
Start: 2021-01-01 | End: 2021-01-01 | Stop reason: HOSPADM

## 2021-01-01 RX ORDER — LIDOCAINE HYDROCHLORIDE 20 MG/ML
15 SOLUTION OROPHARYNGEAL AS NEEDED
Status: DISCONTINUED | OUTPATIENT
Start: 2021-01-01 | End: 2021-01-01 | Stop reason: HOSPADM

## 2021-01-01 RX ORDER — DOXAZOSIN 4 MG/1
4 TABLET ORAL
COMMUNITY

## 2021-01-01 RX ORDER — MAG HYDROX/ALUMINUM HYD/SIMETH 200-200-20
30 SUSPENSION, ORAL (FINAL DOSE FORM) ORAL
Status: DISCONTINUED | OUTPATIENT
Start: 2021-01-01 | End: 2021-01-01 | Stop reason: HOSPADM

## 2021-01-01 RX ORDER — LABETALOL HCL 20 MG/4 ML
40 SYRINGE (ML) INTRAVENOUS
Status: COMPLETED | OUTPATIENT
Start: 2021-01-01 | End: 2021-01-01

## 2021-01-01 RX ORDER — SODIUM CHLORIDE 9 MG/ML
25 INJECTION, SOLUTION INTRAVENOUS CONTINUOUS
Status: DISCONTINUED | OUTPATIENT
Start: 2021-01-01 | End: 2021-01-01 | Stop reason: HOSPADM

## 2021-01-01 RX ORDER — POLYETHYLENE GLYCOL 3350 17 G/17G
17 POWDER, FOR SOLUTION ORAL DAILY PRN
Status: DISCONTINUED | OUTPATIENT
Start: 2021-01-01 | End: 2021-01-01 | Stop reason: HOSPADM

## 2021-01-01 RX ORDER — ONDANSETRON 2 MG/ML
4 INJECTION INTRAMUSCULAR; INTRAVENOUS
Status: DISCONTINUED | OUTPATIENT
Start: 2021-01-01 | End: 2021-01-01 | Stop reason: HOSPADM

## 2021-01-01 RX ORDER — SODIUM CHLORIDE, SODIUM LACTATE, POTASSIUM CHLORIDE, CALCIUM CHLORIDE 600; 310; 30; 20 MG/100ML; MG/100ML; MG/100ML; MG/100ML
50 INJECTION, SOLUTION INTRAVENOUS CONTINUOUS
Status: DISCONTINUED | OUTPATIENT
Start: 2021-01-01 | End: 2021-01-01 | Stop reason: HOSPADM

## 2021-01-01 RX ORDER — HYDROCODONE BITARTRATE AND ACETAMINOPHEN 5; 325 MG/1; MG/1
1 TABLET ORAL AS NEEDED
Status: CANCELLED | OUTPATIENT
Start: 2021-01-01

## 2021-01-01 RX ORDER — FENTANYL CITRATE 50 UG/ML
50 INJECTION, SOLUTION INTRAMUSCULAR; INTRAVENOUS AS NEEDED
Status: CANCELLED | OUTPATIENT
Start: 2021-01-01

## 2021-01-01 RX ORDER — SODIUM CHLORIDE 0.9 % (FLUSH) 0.9 %
5-40 SYRINGE (ML) INJECTION EVERY 8 HOURS
Status: DISCONTINUED | OUTPATIENT
Start: 2021-01-01 | End: 2021-01-01 | Stop reason: HOSPADM

## 2021-01-01 RX ORDER — HYDRALAZINE HYDROCHLORIDE 100 MG/1
100 TABLET, FILM COATED ORAL 3 TIMES DAILY
COMMUNITY

## 2021-01-01 RX ORDER — PANTOPRAZOLE SODIUM 20 MG/1
40 TABLET, DELAYED RELEASE ORAL DAILY
COMMUNITY

## 2021-01-01 RX ORDER — IRBESARTAN 300 MG/1
300 TABLET ORAL DAILY
COMMUNITY

## 2021-01-01 RX ORDER — CLONIDINE HYDROCHLORIDE 0.1 MG/1
0.3 TABLET ORAL EVERY 8 HOURS
Status: DISCONTINUED | OUTPATIENT
Start: 2021-01-01 | End: 2021-01-01 | Stop reason: HOSPADM

## 2021-01-01 RX ORDER — LIDOCAINE HYDROCHLORIDE 10 MG/ML
0.1 INJECTION, SOLUTION EPIDURAL; INFILTRATION; INTRACAUDAL; PERINEURAL AS NEEDED
Status: CANCELLED | OUTPATIENT
Start: 2021-01-01

## 2021-01-01 RX ORDER — LABETALOL 100 MG/1
100 TABLET, FILM COATED ORAL EVERY 12 HOURS
Status: DISCONTINUED | OUTPATIENT
Start: 2021-01-01 | End: 2021-01-01 | Stop reason: HOSPADM

## 2021-01-01 RX ORDER — ONDANSETRON 2 MG/ML
4 INJECTION INTRAMUSCULAR; INTRAVENOUS ONCE
Status: COMPLETED | OUTPATIENT
Start: 2021-01-01 | End: 2021-01-01

## 2021-01-01 RX ORDER — SODIUM CHLORIDE, SODIUM LACTATE, POTASSIUM CHLORIDE, CALCIUM CHLORIDE 600; 310; 30; 20 MG/100ML; MG/100ML; MG/100ML; MG/100ML
75 INJECTION, SOLUTION INTRAVENOUS CONTINUOUS
Status: DISCONTINUED | OUTPATIENT
Start: 2021-01-01 | End: 2021-01-01 | Stop reason: HOSPADM

## 2021-01-01 RX ORDER — PROMETHAZINE HYDROCHLORIDE 25 MG/1
12.5 TABLET ORAL
Status: DISCONTINUED | OUTPATIENT
Start: 2021-01-01 | End: 2021-01-01 | Stop reason: HOSPADM

## 2021-01-01 RX ORDER — FENTANYL CITRATE 50 UG/ML
50 INJECTION, SOLUTION INTRAMUSCULAR; INTRAVENOUS
Status: CANCELLED | OUTPATIENT
Start: 2021-01-01

## 2021-01-01 RX ORDER — PROPOFOL 10 MG/ML
INJECTION, EMULSION INTRAVENOUS AS NEEDED
Status: DISCONTINUED | OUTPATIENT
Start: 2021-01-01 | End: 2021-01-01 | Stop reason: HOSPADM

## 2021-01-01 RX ORDER — MIDAZOLAM HYDROCHLORIDE 1 MG/ML
1 INJECTION, SOLUTION INTRAMUSCULAR; INTRAVENOUS AS NEEDED
Status: CANCELLED | OUTPATIENT
Start: 2021-01-01

## 2021-01-01 RX ORDER — NIFEDIPINE 30 MG/1
90 TABLET, EXTENDED RELEASE ORAL
Status: COMPLETED | OUTPATIENT
Start: 2021-01-01 | End: 2021-01-01

## 2021-01-01 RX ORDER — LABETALOL HCL 20 MG/4 ML
20 SYRINGE (ML) INTRAVENOUS
Status: COMPLETED | OUTPATIENT
Start: 2021-01-01 | End: 2021-01-01

## 2021-01-01 RX ORDER — ONDANSETRON 2 MG/ML
4 INJECTION INTRAMUSCULAR; INTRAVENOUS AS NEEDED
Status: CANCELLED | OUTPATIENT
Start: 2021-01-01

## 2021-01-01 RX ORDER — DIPHENHYDRAMINE HYDROCHLORIDE 50 MG/ML
12.5 INJECTION, SOLUTION INTRAMUSCULAR; INTRAVENOUS AS NEEDED
Status: CANCELLED | OUTPATIENT
Start: 2021-01-01 | End: 2021-01-01

## 2021-01-01 RX ORDER — GABAPENTIN 100 MG/1
200 CAPSULE ORAL
COMMUNITY

## 2021-01-01 RX ORDER — HYDROCODONE BITARTRATE AND ACETAMINOPHEN 5; 325 MG/1; MG/1
1 TABLET ORAL
Status: DISCONTINUED | OUTPATIENT
Start: 2021-01-01 | End: 2021-01-01 | Stop reason: HOSPADM

## 2021-01-01 RX ORDER — SEVELAMER HYDROCHLORIDE 800 MG/1
800 TABLET, FILM COATED ORAL
COMMUNITY

## 2021-01-01 RX ORDER — HEPARIN SODIUM 5000 [USP'U]/ML
5000 INJECTION, SOLUTION INTRAVENOUS; SUBCUTANEOUS EVERY 8 HOURS
Status: DISCONTINUED | OUTPATIENT
Start: 2021-01-01 | End: 2021-01-01 | Stop reason: HOSPADM

## 2021-01-01 RX ORDER — SODIUM CHLORIDE 0.9 % (FLUSH) 0.9 %
5-40 SYRINGE (ML) INJECTION AS NEEDED
Status: CANCELLED | OUTPATIENT
Start: 2021-01-01

## 2021-01-01 RX ORDER — LIDOCAINE HYDROCHLORIDE 20 MG/ML
INJECTION, SOLUTION EPIDURAL; INFILTRATION; INTRACAUDAL; PERINEURAL AS NEEDED
Status: DISCONTINUED | OUTPATIENT
Start: 2021-01-01 | End: 2021-01-01 | Stop reason: HOSPADM

## 2021-01-01 RX ORDER — CHLORTHALIDONE 25 MG/1
25 TABLET ORAL DAILY
Status: DISCONTINUED | OUTPATIENT
Start: 2021-01-01 | End: 2021-01-01 | Stop reason: HOSPADM

## 2021-01-01 RX ORDER — VERAPAMIL HYDROCHLORIDE 120 MG/1
120 TABLET, FILM COATED ORAL 2 TIMES DAILY
COMMUNITY

## 2021-01-01 RX ADMIN — SODIUM CHLORIDE: 9 INJECTION, SOLUTION INTRAVENOUS at 11:00

## 2021-01-01 RX ADMIN — PANTOPRAZOLE SODIUM 40 MG: 40 INJECTION, POWDER, FOR SOLUTION INTRAVENOUS at 17:25

## 2021-01-01 RX ADMIN — CLONIDINE HYDROCHLORIDE 0.2 MG: 0.1 TABLET ORAL at 01:16

## 2021-01-01 RX ADMIN — NIFEDIPINE 90 MG: 30 TABLET, EXTENDED RELEASE ORAL at 17:19

## 2021-01-01 RX ADMIN — PROPOFOL 50 MG: 10 INJECTION, EMULSION INTRAVENOUS at 11:26

## 2021-01-01 RX ADMIN — LABETALOL HYDROCHLORIDE 40 MG: 5 INJECTION, SOLUTION INTRAVENOUS at 00:18

## 2021-01-01 RX ADMIN — CLONIDINE HYDROCHLORIDE 0.2 MG: 0.1 TABLET ORAL at 17:18

## 2021-01-01 RX ADMIN — PROPOFOL 50 MG: 10 INJECTION, EMULSION INTRAVENOUS at 11:02

## 2021-01-01 RX ADMIN — PROPOFOL 50 MG: 10 INJECTION, EMULSION INTRAVENOUS at 11:36

## 2021-01-01 RX ADMIN — SODIUM CHLORIDE 25 ML/HR: 9 INJECTION, SOLUTION INTRAVENOUS at 09:39

## 2021-01-01 RX ADMIN — Medication 10 ML: at 23:43

## 2021-01-01 RX ADMIN — IBUPROFEN 600 MG: 600 TABLET, FILM COATED ORAL at 10:02

## 2021-01-01 RX ADMIN — PROPOFOL 50 MG: 10 INJECTION, EMULSION INTRAVENOUS at 11:00

## 2021-01-01 RX ADMIN — SODIUM CHLORIDE 25 ML/HR: 9 INJECTION, SOLUTION INTRAVENOUS at 08:24

## 2021-01-01 RX ADMIN — PROPOFOL 50 MG: 10 INJECTION, EMULSION INTRAVENOUS at 11:31

## 2021-01-01 RX ADMIN — Medication 10 ML: at 18:50

## 2021-01-01 RX ADMIN — PROPOFOL 50 MG: 10 INJECTION, EMULSION INTRAVENOUS at 11:30

## 2021-01-01 RX ADMIN — ONDANSETRON 4 MG: 2 INJECTION INTRAMUSCULAR; INTRAVENOUS at 05:38

## 2021-01-01 RX ADMIN — PROPOFOL 100 MG: 10 INJECTION, EMULSION INTRAVENOUS at 10:58

## 2021-01-01 RX ADMIN — LIDOCAINE HYDROCHLORIDE 100 MG: 20 INJECTION, SOLUTION EPIDURAL; INFILTRATION; INTRACAUDAL; PERINEURAL at 11:26

## 2021-01-01 RX ADMIN — Medication 10 ML: at 21:17

## 2021-01-01 RX ADMIN — LABETALOL HYDROCHLORIDE 20 MG: 5 INJECTION, SOLUTION INTRAVENOUS at 23:22

## 2021-01-01 RX ADMIN — ONDANSETRON 4 MG: 2 INJECTION INTRAMUSCULAR; INTRAVENOUS at 17:25

## 2021-01-01 RX ADMIN — PROPOFOL 50 MG: 10 INJECTION, EMULSION INTRAVENOUS at 11:34

## 2021-01-01 RX ADMIN — SODIUM CHLORIDE 250 ML: 9 INJECTION, SOLUTION INTRAVENOUS at 04:46

## 2021-01-01 RX ADMIN — PROPOFOL 50 MG: 10 INJECTION, EMULSION INTRAVENOUS at 11:29

## 2021-01-01 RX ADMIN — CLONIDINE HYDROCHLORIDE 0.3 MG: 0.1 TABLET ORAL at 21:15

## 2021-01-01 RX ADMIN — LABETALOL HCL 100 MG: 100 TABLET, FILM COATED ORAL at 21:15

## 2021-01-22 ENCOUNTER — TRANSCRIBE ORDER (OUTPATIENT)
Dept: LAB | Age: 41
End: 2021-01-22

## 2021-01-22 ENCOUNTER — HOSPITAL ENCOUNTER (OUTPATIENT)
Dept: LAB | Age: 41
Discharge: HOME | End: 2021-01-22
Payer: MEDICARE

## 2021-01-22 DIAGNOSIS — D64.9 ANEMIA: Primary | ICD-10-CM

## 2021-01-22 DIAGNOSIS — D64.9 ANEMIA: ICD-10-CM

## 2021-01-22 DIAGNOSIS — N18.6 ESRD (END STAGE RENAL DISEASE) (HCC): ICD-10-CM

## 2021-01-22 LAB
HCT VFR BLD AUTO: 22.9 % (ref 41–53)
HGB BLD-MCNC: 7.7 G/DL (ref 13.5–17.5)

## 2021-01-22 PROCEDURE — 36415 COLL VENOUS BLD VENIPUNCTURE: CPT

## 2021-01-22 PROCEDURE — 85014 HEMATOCRIT: CPT

## 2021-02-05 PROBLEM — N18.6 ESRD (END STAGE RENAL DISEASE) ON DIALYSIS (HCC): Status: ACTIVE | Noted: 2021-01-01

## 2021-02-05 PROBLEM — D64.9 ANEMIA: Status: ACTIVE | Noted: 2021-01-01

## 2021-02-05 PROBLEM — Z99.2 ESRD (END STAGE RENAL DISEASE) ON DIALYSIS (HCC): Status: ACTIVE | Noted: 2021-01-01

## 2021-02-05 PROBLEM — D64.9 SYMPTOMATIC ANEMIA: Status: ACTIVE | Noted: 2021-01-01

## 2021-02-05 NOTE — ED PROVIDER NOTES
EMERGENCY DEPARTMENT HISTORY AND PHYSICAL EXAM      Date: 2/5/2021  Patient Name: Tyrell Henning    History of Presenting Illness     Chief Complaint   Patient presents with    Irregular Heart Beat     pt presents with palpitations, states ongoing for two days. States that they get worse with movement, pt also says that he has become dizzy. Pt pmh of HTN, dialysis. Pt ambulatory to room w/o issue       History Provided By: Patient    HPI: Tyrell Henning, 41 y.o. male with a past medical history significant hypertension and Hemodialysis presents to the ED with cc of palpitations at 0100 hours, has not had an episode since that time; patient denies chest pain no shortness of breath but patient did have some dizziness; at the time of the palpitations patient was making his bed; There are no other complaints, changes, or physical findings at this time. PCP: None    No current facility-administered medications on file prior to encounter. Current Outpatient Medications on File Prior to Encounter   Medication Sig Dispense Refill    cloNIDine (CATAPRES) 0.3 mg tablet Take 1 Tab by mouth every eight (8) hours. 21 Tab 0    labetalol (NORMODYNE) 100 mg tablet Take 1 Tab by mouth every twelve (12) hours. 14 Tab 0    NIFEdipine ER (PROCARDIA XL) 90 mg ER tablet Take 1 Tab by mouth daily. 7 Tab 0    spironolactone (ALDACTONE) 25 mg tablet Take 1 Tab by mouth two (2) times a day. 14 Tab 0    HYDROcodone-acetaminophen (NORCO) 5-325 mg per tablet Take 1 Tab by mouth every eight (8) hours as needed for Pain. 14 Tab 0    lisinopril (PRINIVIL, ZESTRIL) 20 mg tablet Take 20 mg by mouth daily.  chlorthalidone (HYGROTEN) 25 mg tablet Take 25 mg by mouth daily.  NIFEdipine ER (PROCARDIA XL) 90 mg ER tablet Take 90 mg by mouth daily.          Past History     Past Medical History:  Past Medical History:   Diagnosis Date    Dialysis patient ([facility] [address]     Hypertension     Kidney damage        Past Surgical History:  Past Surgical History:   Procedure Laterality Date    HX OTHER SURGICAL      kidney removed       Family History:  Family History   Problem Relation Age of Onset    Hypertension Mother     Hypertension Father        Social History:  Social History     Tobacco Use    Smoking status: Current Every Day Smoker     Packs/day: 0.50     Years: 7.00     Pack years: 3.50    Smokeless tobacco: Never Used   Substance Use Topics    Alcohol use: Yes     Alcohol/week: 11.7 standard drinks     Types: 14 Cans of beer per week     Comment: 2 beers a day    Drug use: No       Allergies:  No Known Allergies      Review of Systems     Review of Systems   Constitutional: Positive for fatigue. Negative for chills, diaphoresis and fever. HENT: Negative for rhinorrhea and sore throat. Eyes: Negative for photophobia and discharge. Respiratory: Negative for chest tightness and shortness of breath. Cardiovascular: Positive for palpitations. Gastrointestinal: Negative for abdominal pain and vomiting. Endocrine: Negative for polydipsia and polyuria. Genitourinary: Negative for dysuria and urgency. Musculoskeletal: Negative for back pain and neck pain. Skin: Negative for color change and pallor. Neurological: Positive for dizziness. Negative for weakness and numbness. Psychiatric/Behavioral: Negative. Physical Exam     Physical Exam  Vitals signs and nursing note reviewed. Constitutional:       Appearance: Normal appearance. HENT:      Head: Normocephalic and atraumatic. Mouth/Throat:      Mouth: Mucous membranes are moist.      Pharynx: Oropharynx is clear. Eyes:      Extraocular Movements: Extraocular movements intact. Conjunctiva/sclera: Conjunctivae normal.      Pupils: Pupils are equal, round, and reactive to light. Neck:      Musculoskeletal: Normal range of motion and neck supple. Cardiovascular:      Rate and Rhythm: Normal rate and regular rhythm.       Pulses: Normal pulses. Heart sounds: Normal heart sounds. Pulmonary:      Effort: Pulmonary effort is normal.      Breath sounds: Normal breath sounds. Abdominal:      General: Bowel sounds are normal.      Palpations: Abdomen is soft. Musculoskeletal: Normal range of motion. Skin:     General: Skin is warm. Capillary Refill: Capillary refill takes less than 2 seconds. Neurological:      General: No focal deficit present. Mental Status: He is alert. Psychiatric:         Mood and Affect: Mood normal.         Behavior: Behavior normal.         Lab and Diagnostic Study Results     Labs -   No results found for this or any previous visit (from the past 12 hour(s)). Radiologic Studies -   @lastxrresult@  CT Results  (Last 48 hours)    None        CXR Results  (Last 48 hours)    None            Medical Decision Making   - I am the first provider for this patient. - I reviewed the vital signs, available nursing notes, past medical history, past surgical history, family history and social history. - Initial assessment performed. The patients presenting problems have been discussed, and they are in agreement with the care plan formulated and outlined with them. I have encouraged them to ask questions as they arise throughout their visit. Vital Signs-Reviewed the patient's vital signs.   Patient Vitals for the past 12 hrs:   Temp Pulse Resp BP SpO2   02/05/21 1550 98.5 °F (36.9 °C) 79 19 118/84 99 %       Records Reviewed: Nursing Notes    The patient presents with palpitations with a differential diagnosis of cardiac dysrhythmia, metabolic imbalance, pneumonia      ED Course:     ED Course as of Feb 05 1845 Fri Feb 05, 2021   1632 EKG rate 78 sinus rhythm VA interval 176 QT interval 452 left ventricular hypertrophy no ST elevations or depressions    [SB]   1658 Case discussed with Dr. Eva Echols    [SB]      ED Course User Index  [SB] Rosangela Hall MD       Provider Notes (Medical Decision Making): MDM       Procedures   Medical Decision Makingedical Decision Making  Performed by: Lazaro Singleton MD  PROCEDURES:  Procedures       Disposition   Disposition: Condition stable  Admitted to Floor Medical Floor the case was discussed with the admitting physician nurse practitioner Arianne Adorno        DISCHARGE PLAN:  1. Current Discharge Medication List      CONTINUE these medications which have NOT CHANGED    Details   cloNIDine (CATAPRES) 0.3 mg tablet Take 1 Tab by mouth every eight (8) hours. Qty: 21 Tab, Refills: 0    Comments: Per Dr. Berenice Martinez (Nephrology)      labetalol (NORMODYNE) 100 mg tablet Take 1 Tab by mouth every twelve (12) hours. Qty: 14 Tab, Refills: 0    Comments: Per Dr. Berenice Martinez (Nephrology)      !! NIFEdipine ER (PROCARDIA XL) 90 mg ER tablet Take 1 Tab by mouth daily. Qty: 7 Tab, Refills: 0    Comments: Per Dr. Berenice Martinez (Nephrology)      spironolactone (ALDACTONE) 25 mg tablet Take 1 Tab by mouth two (2) times a day. Qty: 14 Tab, Refills: 0    Comments: Per Dr. Berenice Martinez (Nephrology)      HYDROcodone-acetaminophen [facility]) 5-325 mg per tablet Take 1 Tab by mouth every eight (8) hours as needed for Pain. Qty: 14 Tab, Refills: 0      lisinopril (PRINIVIL, ZESTRIL) 20 mg tablet Take 20 mg by mouth daily. chlorthalidone (HYGROTEN) 25 mg tablet Take 25 mg by mouth daily. !! NIFEdipine ER (PROCARDIA XL) 90 mg ER tablet Take 90 mg by mouth daily. !! - Potential duplicate medications found. Please discuss with provider. 2.   Follow-up Information    None       3. Return to ED if worse   4. Current Discharge Medication List            Diagnosis     Clinical Impression: No diagnosis found. Attestations:    Lazaro Singleton MD    Please note that this dictation was completed with [employer], the [employer] voice recognition software.   Quite often unanticipated grammatical, syntax, homophones, and other interpretive errors are inadvertently transcribed by the computer software. Please disregard these errors. Please excuse any errors that have escaped final proofreading. Thank you.

## 2021-02-05 NOTE — ED NOTES
Type and Screen drawn and sent, pt tolerated well. In NAD and VS stable.  Pt has no needs at this time will continue to monitor

## 2021-02-05 NOTE — ED NOTES
Attempted to call report on pt at [phone], pt in NAD, eating food that was brought by wife. Pt VS stable, in NAD.  Will continue to monitor

## 2021-02-05 NOTE — H&P
History and Physical    Subjective:     Samia Madison is a 41 y.o. male  has a past medical history of Dialysis patient [facility]), Hypertension, and Kidney damage. Presents to ED with isolated episode of palpitations and weakness. Evaluated in the ED and found to have low H&H and scheduled for HD Saturday. Case reviewed with Neph on call who asked for transfusion and will follow. Admit observation and do HD than discharge. Past Medical History:   Diagnosis Date    Dialysis patient ([facility] [address]     Hypertension     Kidney damage       Past Surgical History:   Procedure Laterality Date    HX OTHER SURGICAL      kidney removed     Family History   Problem Relation Age of Onset    Hypertension Mother     Hypertension Father       Social History     Tobacco Use    Smoking status: Current Every Day Smoker     Packs/day: 0.50     Years: 7.00     Pack years: 3.50    Smokeless tobacco: Never Used   Substance Use Topics    Alcohol use: Yes     Alcohol/week: 11.7 standard drinks     Types: 14 Cans of beer per week     Comment: 2 beers a day       Prior to Admission medications    Medication Sig Start Date End Date Taking? Authorizing Provider   cloNIDine (CATAPRES) 0.3 mg tablet Take 1 Tab by mouth every eight (8) hours. 3/5/14   Elidia Villanueva PA-C   labetalol (NORMODYNE) 100 mg tablet Take 1 Tab by mouth every twelve (12) hours. 3/5/14   Elidia Villanueva PA-C   NIFEdipine ER (PROCARDIA XL) 90 mg ER tablet Take 1 Tab by mouth daily. 3/5/14   Elidia Villanueva PA-C   spironolactone (ALDACTONE) 25 mg tablet Take 1 Tab by mouth two (2) times a day. 3/5/14   Elidia Villanueva PA-C   HYDROcodone-acetaminophen (NORCO) 5-325 mg per tablet Take 1 Tab by mouth every eight (8) hours as needed for Pain. 3/5/14   Elidia Villanueva PA-C   lisinopril (PRINIVIL, ZESTRIL) 20 mg tablet Take 20 mg by mouth daily. Provider, Historical   chlorthalidone (HYGROTEN) 25 mg tablet Take 25 mg by mouth daily.     Provider, Historical NIFEdipine ER (PROCARDIA XL) 90 mg ER tablet Take 90 mg by mouth daily. Provider, Historical     No Known Allergies       REVIEW OF SYSTEMS:       Total of 12 systems reviewed as follows:       POSITIVE= underlined text  Negative = text not underlined  General:  fever, chills, sweats, generalized weakness, weight loss/gain,      loss of appetite   Eyes:    blurred vision, eye pain, loss of vision, double vision  ENT:    rhinorrhea, pharyngitis   Respiratory:  cough, sputum production, SOB, PEÑA, wheezing, pleuritic pain   Cardiology:   chest pain, palpitations, orthopnea, PND, edema, syncope   Gastrointestinal:  abdominal pain , N/V, diarrhea, dysphagia, constipation, bleeding   Genitourinary:  frequency, urgency, dysuria, hematuria, incontinence   Muskuloskeletal :  arthralgia, myalgia, back pain  Hematology: easy bruising, nose or gum bleeding, lymphadenopathy   Dermatological: rash, ulceration, pruritis, color change / jaundice  Endocrine:   hot flashes or polydipsia   Neurological:  headache, dizziness, confusion, focal weakness, paresthesia,     Speech difficulties, memory loss, gait difficulty  Psychological: Feelings of anxiety, depression, agitation       Objective:   VITALS:    Visit Vitals  /68   Pulse 83   Temp 98.5 °F (36.9 °C)   Resp 16   Ht 5' 5\" (1.651 m)   Wt 61.2 kg (135 lb)   SpO2 100%   BMI 22.47 kg/m²       PHYSICAL EXAM:    General:    Alert, cooperative, no distress, appears stated age. HEENT: Atraumatic, anicteric sclerae, pink conjunctivae     No oral ulcers, mucosa moist, throat clear, dentition fair  Neck:  Supple, symmetrical,  thyroid: non tender  Lungs:   Clear to auscultation bilaterally. No Wheezing or Rhonchi. No rales. Chest wall:  No tenderness  No Accessory muscle use. Heart:   Regular  rhythm,  No  murmur   No edema  Abdomen:   Soft, non-tender. Not distended. Bowel sounds normal  Extremities: No cyanosis.   No clubbing,      Skin turgor normal, Capillary refill normal, Radial dial pulse 2+  Skin:     Not pale. Not Jaundiced  No rashes   Psych:  Fair insight. Not depressed. Not anxious or agitated. Neurologic: EOMs intact. No facial asymmetry. No aphasia or slurred speech. Symmetrical strength,   Sensation grossly intact. Alert and oriented X 4.     _______________________________________________________________________  Care Plan discussed with:    Comments   Patient X    Family      RN X    Care Manager                    Consultant:      _______________________________________________________________________  Expected  Disposition:   Home with Family X   HH/PT/OT/RN    SNF/LTC    KAVITHA    ________________________________________________________________________  TOTAL TIME:  48 Minutes    Critical Care Provided     Minutes non procedure based      Comments    X Reviewed previous records   >50% of visit spent in counseling and coordination of care X Discussion with patient and/or family and questions answered       ________________________________________________________________________    Labs:  Recent Results (from the past 24 hour(s))   CBC WITH AUTOMATED DIFF    Collection Time: 02/05/21  4:11 PM   Result Value Ref Range    WBC 6.0 4.4 - 11.3 K/uL    RBC 1.86 (L) 4.50 - 5.90 M/uL    HGB 6.1 (L) 13.5 - 17.5 g/dL    HCT 18.2 (L) 41 - 53 %    MCV 97.8 80 - 100 FL    MCH 32.8 31 - 34 PG    MCHC 33.5 31.0 - 36.0 g/dL    RDW 17.0 (H) 11.5 - 14.5 %    PLATELET 702 K/uL    MPV 7.5 6.5 - 11.5 FL    NRBC 0.1  WBC    ABSOLUTE NRBC 0.01 K/uL    NEUTROPHILS 58 42 - 75 %    LYMPHOCYTES 32 20.5 - 51.1 %    MONOCYTES 8 1.7 - 9.3 %    EOSINOPHILS 1 0.9 - 2.9 %    BASOPHILS 1 0.0 - 2.5 %    ABS. NEUTROPHILS 3.5 1.8 - 7.7 K/UL    ABS. LYMPHOCYTES 1.9 1.0 - 4.8 K/UL    ABS. MONOCYTES 0.5 0.2 - 2.4 K/UL    ABS. EOSINOPHILS 0.0 0.0 - 0.7 K/UL    ABS.  BASOPHILS 0.1 0.0 - 0.2 K/UL   PROTHROMBIN TIME + INR    Collection Time: 02/05/21  4:11 PM   Result Value Ref Range    Prothrombin time 11.4 (H) 9.0 - 11.1 sec    INR 1.2 (H) 0.9 - 1.1     METABOLIC PANEL, COMPREHENSIVE    Collection Time: 02/05/21  4:11 PM   Result Value Ref Range    Sodium 136 136 - 145 mmol/L    Potassium 3.5 3.5 - 5.1 mmol/L    Chloride 96 (L) 97 - 108 mmol/L    CO2 34 (H) 21 - 32 mmol/L    Anion gap 6 5 - 15 mmol/L    Glucose 104 (H) 65 - 100 mg/dL    BUN 38 (H) 6 - 20 mg/dL    Creatinine 11.18 (H) 0.70 - 1.30 mg/dL    BUN/Creatinine ratio 3 (L) 12 - 20      GFR est AA 6 (L) >60 ml/min/1.73m2    GFR est non-AA 5 (L) >60 ml/min/1.73m2    Calcium 9.6 8.5 - 10.1 mg/dL    Bilirubin, total 0.5 0.2 - 1.0 mg/dL    AST (SGOT) 13 (L) 15 - 37 U/L    ALT (SGPT) 9 (L) 12 - 78 U/L    Alk. phosphatase 79 45 - 117 U/L    Protein, total 6.7 6.4 - 8.2 g/dL    Albumin 3.3 (L) 3.5 - 5.0 g/dL    Globulin 3.4 2.0 - 4.0 g/dL    A-G Ratio 1.0 (L) 1.1 - 2.2     MAGNESIUM    Collection Time: 02/05/21  4:11 PM   Result Value Ref Range    Magnesium 2.1 1.6 - 2.4 mg/dL       Imaging:  No results found.      Assessment & Plan:       Symptomatic anemia  -type and screen 1 unit PRBC  -Monitor H&H/CBC post    ESRD (end stage renal disease) on dialysis ([facility] [address]  -Neph contacted and following  -HD T/Th/Sat  -Perform HD Saturday per Neph    Hypertension  -BP controlled on admission  -Continue home meds   -Verify medications for reconciliation         Code Status: Full    Prophylaxis: Lovenox 40 mg subcu daily     Electronically Signed : Octavio Titus, PhD, PA-C, Hospital Medicine Service

## 2021-02-05 NOTE — ED TRIAGE NOTES
.  Chief Complaint   Patient presents with    Irregular Heart Beat     pt presents with palpitations, states ongoing for two days. States that they get worse with movement, pt also says that he has become dizzy. Pt pmh of HTN, dialysis.  Pt ambulatory to room w/o issue

## 2021-02-06 NOTE — DISCHARGE SUMMARY
Discharge Summary    42 yo HD patient was admitted with symptomatic anemia a few hours ago. He received 1 unit of prbcs then decided to leave AMA before 7am.  He is due for HD today and was encouraged not to miss it.     Discharge Diagnoses:  Symptomatic anemia  ESRD  Hypertension          Greg Monzon MD

## 2021-02-06 NOTE — ED NOTES
TRANSFER - OUT REPORT:    Verbal report given to Kirk Kidd on [place] [facility]  being transferred to [id number] for routine progression of care       Report consisted of patients Situation, Background, Assessment and   Recommendations(SBAR). Information from the following report(s) SBAR was reviewed with the receiving nurse. Opportunity for questions and clarification was provided.       Patient transported with:   Registered Nurse

## 2021-02-06 NOTE — PROGRESS NOTES
Pt arrived from ED via stretcher, blood obtained and transfusion started, blood currently transfusing, this RN stayed with pt for first 20 minutes of transfusion, no transfusion reaction noted, vital signs remained stable, admission questions completed, pt denies any further needs at this time, will continue to monitor

## 2021-02-06 NOTE — PROGRESS NOTES
Pt signed out AMA, on call provider Efrain Sanchez MD made aware and instructed this writer to educated the pt on the importance of getting his HDU session today as he is a T,TH,Sat HDU pt and has excess fluid d/t receiving 1 unit of PRBC last night. Pt verbalizes an understanding and says he will be going to dialysis outpatient as scheduled today. Pt called for his own transportation home, PIV was removed.  Pt denies any questions or concerns, AMA paper signed, witnessed and placed on pt's chart

## 2021-02-06 NOTE — ED NOTES
The patient is lying on the bed in left lateral recumbent position with his eyes closed and he appears to be resting. NAD noted at this time.

## 2021-02-06 NOTE — ED NOTES
Patient care and bedside report received from [facility]. The patient is sitting up in bed talking to staff. The patient is alert and oriented X 4 with even rise and fall of the chest noted. The patient has family at the bedside. Catie Travis

## 2021-05-28 NOTE — PROGRESS NOTES
Pt /116mmHg pt reports not taking his blood pressure medication today, anaesthesia Dr Kelly Draper made aware, no orders received, case to be rescheduled to a further date. Pt advised to take his medication prior to the procedure next time

## 2021-06-21 NOTE — ED TRIAGE NOTES
Pt reports abdominal pain and vomiting that started this morning. Pt also voices c/o left chest pain. Pt denies any other symptoms.

## 2021-06-21 NOTE — ED PROVIDER NOTES
EMERGENCY DEPARTMENT HISTORY AND PHYSICAL EXAM      Date: 6/21/2021  Patient Name: Kesha Peters      History of Presenting Illness     Chief Complaint   Patient presents with    Abdominal Pain    Vomiting    Chest Pain       History Provided By: Patient    HPI: Kesha Peters, 41 y.o. male with a past medical history significant for end-stage renal disease on hemodialysis hypertension, renal insufficiency and GERD and multiple other medical problems listed in the old record and below presents to the ED with cc of upper abdominal pain and vomiting. This is been ongoing intermittent problem for over 2 months now but much worse over the last 24 hours patient was scheduled to have a EGD approximately 6 weeks ago canceled due to uncontrolled hypertension he has been unable to keep his medications down today due to vomiting small amount of blood in emesis last hemodialysis was 3 days ago and scheduled for tomorrow morning he denies shortness of breath said minor chest discomfort on the right side earlier today no fever no significant GI blood loss. There are no other complaints, changes, or physical findings at this time. PCP: None    Current Outpatient Medications   Medication Sig Dispense Refill    cloNIDine (CATAPRES) 0.3 mg tablet Take 1 Tab by mouth every eight (8) hours. (Patient not taking: Reported on 5/28/2021) 21 Tab 0    labetalol (NORMODYNE) 100 mg tablet Take 1 Tab by mouth every twelve (12) hours. 14 Tab 0    NIFEdipine ER (PROCARDIA XL) 90 mg ER tablet Take 1 Tab by mouth daily. (Patient not taking: Reported on 5/28/2021) 7 Tab 0    spironolactone (ALDACTONE) 25 mg tablet Take 1 Tab by mouth two (2) times a day. (Patient not taking: Reported on 5/28/2021) 14 Tab 0    HYDROcodone-acetaminophen (NORCO) 5-325 mg per tablet Take 1 Tab by mouth every eight (8) hours as needed for Pain.  (Patient not taking: Reported on 5/28/2021) 14 Tab 0    lisinopril (PRINIVIL, ZESTRIL) 20 mg tablet Take 20 mg by mouth daily. (Patient not taking: Reported on 5/28/2021)      chlorthalidone (HYGROTEN) 25 mg tablet Take 25 mg by mouth daily. (Patient not taking: Reported on 5/28/2021)      NIFEdipine ER (PROCARDIA XL) 90 mg ER tablet Take 90 mg by mouth daily. (Patient not taking: Reported on 5/28/2021)         Past History     Past Medical History:  Past Medical History:   Diagnosis Date    Chronic kidney disease     dialysis [facility]    Dialysis patient ([address]     GERD (gastroesophageal reflux disease)     Hypertension     Kidney damage        Past Surgical History:  Past Surgical History:   Procedure Laterality Date    HX OTHER SURGICAL      kidney removed       Family History:  Family History   Problem Relation Age of Onset    Hypertension Mother     Hypertension Father        Social History:  Social History     Tobacco Use    Smoking status: Current Every Day Smoker     Packs/day: 0.50     Years: 7.00     Pack years: 3.50    Smokeless tobacco: Never Used   Substance Use Topics    Alcohol use: Yes     Alcohol/week: 11.7 standard drinks     Types: 14 Cans of beer per week     Comment: 2 beers a day    Drug use: No       Allergies:  No Known Allergies      Review of Systems   Review of all other systems negative  Review of Systems    Physical Exam   Thin middle-aged AA male mildly uncomfortable no acute distress  Physical Exam  Vitals and nursing note reviewed. Constitutional:       General: He is not in acute distress. Appearance: Normal appearance. He is well-developed and normal weight. He is not ill-appearing or toxic-appearing. HENT:      Head: Normocephalic and atraumatic. Nose: Nose normal.      Mouth/Throat:      Mouth: Mucous membranes are moist.   Eyes:      Extraocular Movements: Extraocular movements intact. Conjunctiva/sclera: Conjunctivae normal.   Neck:      Vascular: No carotid bruit. Cardiovascular:      Rate and Rhythm: Normal rate and regular rhythm.       Pulses: Normal pulses. Heart sounds: Normal heart sounds. Pulmonary:      Effort: Pulmonary effort is normal. No respiratory distress. Breath sounds: Normal breath sounds. No wheezing, rhonchi or rales. Abdominal:      General: Abdomen is flat. There is no distension. Palpations: Abdomen is soft. There is no mass. Tenderness: There is abdominal tenderness in the epigastric area. There is no right CVA tenderness, left CVA tenderness, guarding or rebound. Hernia: No hernia is present. Musculoskeletal:         General: Normal range of motion. Cervical back: Normal range of motion and neck supple. No rigidity. No muscular tenderness. Skin:     General: Skin is warm and dry. Neurological:      General: No focal deficit present. Mental Status: He is alert and oriented to person, place, and time. Mental status is at baseline. Psychiatric:         Mood and Affect: Mood normal.         Behavior: Behavior normal.         Thought Content:  Thought content normal.         Lab and Diagnostic Study Results     Labs -     Recent Results (from the past 12 hour(s))   EKG, 12 LEAD, INITIAL    Collection Time: 06/21/21  4:07 PM   Result Value Ref Range    Ventricular Rate 77 BPM    Atrial Rate 77 BPM    P-R Interval 159 ms    QRS Duration 92 ms    Q-T Interval 383 ms    QTC Calculation (Bezet) 434 ms    Calculated P Axis 32 degrees    Calculated R Axis -3 degrees    Calculated T Axis 58 degrees    Diagnosis       Sinus rhythm  Left ventricular hypertrophy  Baseline wander in lead(s) V1     CBC WITH AUTOMATED DIFF    Collection Time: 06/21/21  5:30 PM   Result Value Ref Range    WBC 6.2 4.4 - 11.3 K/uL    RBC 2.78 (L) 4.50 - 5.90 M/uL    HGB 9.2 (L) 13.5 - 17.5 g/dL    HCT 26.9 (L) 41 - 53 %    MCV 97.0 80 - 100 FL    MCH 33.3 31 - 34 PG    MCHC 34.3 31.0 - 36.0 g/dL    RDW 15.3 (H) 11.5 - 14.5 %    PLATELET 450 417 - 247 K/uL    MPV 8.3 6.5 - 11.5 FL    NRBC 0.0  WBC    ABSOLUTE NRBC 0.00 K/uL    NEUTROPHILS 80 (H) 42 - 75 %    LYMPHOCYTES 14 (L) 20.5 - 51.1 %    MONOCYTES 5 1.7 - 9.3 %    EOSINOPHILS 0 (L) 0.9 - 2.9 %    BASOPHILS 1 0.0 - 2.5 %    ABS. NEUTROPHILS 5.0 1.8 - 7.7 K/UL    ABS. LYMPHOCYTES 0.8 (L) 1.0 - 4.8 K/UL    ABS. MONOCYTES 0.3 0.2 - 2.4 K/UL    ABS. EOSINOPHILS 0.0 0.0 - 0.7 K/UL    ABS. BASOPHILS 0.1 0.0 - 0.2 K/UL   METABOLIC PANEL, COMPREHENSIVE    Collection Time: 06/21/21  5:30 PM   Result Value Ref Range    Sodium 136 136 - 145 mmol/L    Potassium 3.4 (L) 3.5 - 5.1 mmol/L    Chloride 96 (L) 97 - 108 mmol/L    CO2 29 21 - 32 mmol/L    Anion gap 11 5 - 15 mmol/L    Glucose 99 65 - 100 mg/dL    BUN 44 (H) 6 - 20 mg/dL    Creatinine 13.50 (H) 0.70 - 1.30 mg/dL    BUN/Creatinine ratio 3 (L) 12 - 20      GFR est AA 5 (L) >60 ml/min/1.73m2    GFR est non-AA 4 (L) >60 ml/min/1.73m2    Calcium 9.8 8.5 - 10.1 mg/dL    Bilirubin, total 0.6 0.2 - 1.0 mg/dL    AST (SGOT) 14 (L) 15 - 37 U/L    ALT (SGPT) 12 12 - 78 U/L    Alk. phosphatase 67 45 - 117 U/L    Protein, total 7.5 6.4 - 8.2 g/dL    Albumin 3.7 3.5 - 5.0 g/dL    Globulin 3.8 2.0 - 4.0 g/dL    A-G Ratio 1.0 (L) 1.1 - 2.2     TROPONIN I    Collection Time: 06/21/21  5:30 PM   Result Value Ref Range    Troponin-I, Qt. <0.05 <0.05 ng/mL   BNP    Collection Time: 06/21/21  5:30 PM   Result Value Ref Range    NT pro-BNP 3,847 (H) <125 pg/mL       Radiologic Studies -   [unfilled]  CT Results  (Last 48 hours)    None        CXR Results  (Last 48 hours)               06/21/21 1715  XR CHEST PORT Final result    Impression:  No acute findings. Narrative:  Frontal radiograph of the chest 5:12 PM compared with December 19, 2015. INDICATION: Chest pain and vomiting. Heart size appears within normal limits. Patient has taken a better depth of   inspiration. No infiltrate or effusion. Bones appear intact.                  Medical Decision Making and ED Course   - I am the first and primary provider for this patient AND AM THE PRIMARY PROVIDER OF RECORD. - I reviewed the vital signs, available nursing notes, past medical history, past surgical history, family history and social history. - Initial assessment performed. The patients presenting problems have been discussed, and the staff are in agreement with the care plan formulated and outlined with them. I have encouraged them to ask questions as they arise throughout their visit. Vital Signs-Reviewed the patient's vital signs. Patient Vitals for the past 12 hrs:   Temp Pulse Resp BP SpO2   06/21/21 1835 -- 79 18 (!) 159/109 98 %   06/21/21 1640 -- -- -- (!) 191/116 --   06/21/21 1610 98.2 °F (36.8 °C) -- -- -- --   06/21/21 1609 -- 75 17 (!) 214/125 97 %       EKG interpretation: (Preliminary): Performed at 1607, and read at 1608  Rhythm: normal sinus rhythm; and regular . Rate (approx.): 77; Axis: normal; NJ interval: normal; QRS interval: normal ; ST/T wave: normal; Other findings: LVH. Records Reviewed: Nursing Notes and Old Medical Records    The patient presents with abdominal pain with a differential diagnosis of abdominal pain, appendicitis, biliary colic, cholecystitis, diverticulitis, gastritis, gastroenteritis, GERD, pancreatitis, PUD, renal Colic, and vomiting    ED Course:              Provider Notes (Medical Decision Making):   68-year-old male presents emergency department with upper abdominal pain and vomiting this is been ongoing problem for several months intermittently though worse today. Markedly elevated blood pressure due to inability to take antihypertensives this morning this is improved with oral meds antiemetics. Feeling better after meds repeat exam minimal discomfort with deep palpation of the upper abdomen.   Labs are unremarkable options discussed with patient prefers discharge or follow-up with his doctor tomorrow  MDM           Consultations:       Consultations: - NONE        Procedures and Critical Care       Performed by: Scooter Valera Antoine Pastrana MD  PROCEDURES:  Procedures                 Disposition     Disposition: Condition stable and improved  DC- Adult Discharges: All of the diagnostic tests were reviewed and questions answered. Diagnosis, care plan and treatment options were discussed. The patient understands the instructions and will follow up as directed. The patients results have been reviewed with them. They have been counseled regarding their diagnosis. The patient verbally convey understanding and agreement of the signs, symptoms, diagnosis, treatment and prognosis and additionally agrees to follow up as recommended with their PCP in 24 - 48 hours. They also agree with the care-plan and convey that all of their questions have been answered. I have also put together some discharge instructions for them that include: 1) educational information regarding their diagnosis, 2) how to care for their diagnosis at home, as well a 3) list of reasons why they would want to return to the ED prior to their follow-up appointment, should their condition change. Remove if not discharged  DISCHARGE PLAN:  1. Current Discharge Medication List      CONTINUE these medications which have NOT CHANGED    Details   cloNIDine (CATAPRES) 0.3 mg tablet Take 1 Tab by mouth every eight (8) hours. Qty: 21 Tab, Refills: 0    Comments: Per Dr. Amarilys Encinas (Nephrology)      labetalol (NORMODYNE) 100 mg tablet Take 1 Tab by mouth every twelve (12) hours. Qty: 14 Tab, Refills: 0    Comments: Per Dr. Amarilys Encinas (Nephrology)      !! NIFEdipine ER (PROCARDIA XL) 90 mg ER tablet Take 1 Tab by mouth daily. Qty: 7 Tab, Refills: 0    Comments: Per Dr. Amarilys Encinas (Nephrology)      spironolactone (ALDACTONE) 25 mg tablet Take 1 Tab by mouth two (2) times a day. Qty: 14 Tab, Refills: 0    Comments: Per Dr. Amarilys Encinas (Nephrology)      HYDROcodone-acetaminophen [facility]) 5-325 mg per tablet Take 1 Tab by mouth every eight (8) hours as needed for Pain.   Qty: 14 Tab, Refills: 0      lisinopril (PRINIVIL, ZESTRIL) 20 mg tablet Take 20 mg by mouth daily. chlorthalidone (HYGROTEN) 25 mg tablet Take 25 mg by mouth daily. !! NIFEdipine ER (PROCARDIA XL) 90 mg ER tablet Take 90 mg by mouth daily. !! - Potential duplicate medications found. Please discuss with provider. 2.   Follow-up Information    None       3. Return to ED if worse   4. Current Discharge Medication List          Diagnosis     Clinical Impression: Vomiting upper abdominal pain  Attestations:    Yonathan Camarillo MD    Please note that this dictation was completed with [employer], the computer voice recognition software. Quite often unanticipated grammatical, syntax, homophones, and other interpretive errors are inadvertently transcribed by the computer software. Please disregard these errors. Please excuse any errors that have escaped final proofreading. Thank you.

## 2021-07-03 PROBLEM — R10.13 ABDOMINAL PAIN, EPIGASTRIC: Status: ACTIVE | Noted: 2021-01-01

## 2021-07-03 NOTE — ED TRIAGE NOTES
Pt states that he has a hx of GERD and take protonix but it is not helping. Pt states that he has been having these pains for months. He has an appointment with the GI doctor later this month, Pt points to the pain in his LUQ. Pt states that he has been throwing up for the past three day and states he has seen blood in his vomitus.

## 2021-07-03 NOTE — ED PROVIDER NOTES
EMERGENCY DEPARTMENT HISTORY AND PHYSICAL EXAM  ?    Date: 7/2/2021  Patient Name: Edilberto Vail    History of Presenting Illness    Patient presents with:  Abdominal Pain      History Provided By: Patient    HPI: Edilberto Vail, 41 y.o. male with a past medical history significant for hypertension and dialysis presents to the ED with cc of epigastric abdominal pain for several months. Pain is constant. He is scheduled to have endoscopy later this month. He is taking Protonix without relief. There are no other complaints, changes, or physical findings at this time. PCP: None    Current Facility-Administered Medications:  lidocaine (XYLOCAINE) 2 % viscous solution 15 mL, 15 mL, Mouth/Throat, PRN, Jasmin Delacruz MD  alum-mag hydroxide-simeth (MYLANTA) oral suspension 30 mL, 30 mL, Oral, Q4H PRN, Jasmin MD    Current Outpatient Medications:  pantoprazole (PROTONIX) 20 mg tablet, Take 20 mg by mouth daily. , Disp: , Rfl:   ondansetron (Zofran ODT) 4 mg disintegrating tablet, 1 Tablet by SubLINGual route every eight (8) hours as needed for Nausea or Vomiting., Disp: 20 Tablet, Rfl: 0  cloNIDine (CATAPRES) 0.3 mg tablet, Take 1 Tab by mouth every eight (8) hours. (Patient not taking: Reported on 5/28/2021), Disp: 21 Tab, Rfl: 0  labetalol (NORMODYNE) 100 mg tablet, Take 1 Tab by mouth every twelve (12) hours. , Disp: 14 Tab, Rfl: 0  NIFEdipine ER (PROCARDIA XL) 90 mg ER tablet, Take 1 Tab by mouth daily. (Patient not taking: Reported on 5/28/2021), Disp: 7 Tab, Rfl: 0  spironolactone (ALDACTONE) 25 mg tablet, Take 1 Tab by mouth two (2) times a day. (Patient not taking: Reported on 5/28/2021), Disp: 14 Tab, Rfl: 0  HYDROcodone-acetaminophen (NORCO) 5-325 mg per tablet, Take 1 Tab by mouth every eight (8) hours as needed for Pain. (Patient not taking: Reported on 5/28/2021), Disp: 14 Tab, Rfl: 0  lisinopril (PRINIVIL, ZESTRIL) 20 mg tablet, Take 20 mg by mouth daily.  (Patient not taking: Reported on 5/28/2021), Disp: , Rfl:   chlorthalidone (HYGROTEN) 25 mg tablet, Take 25 mg by mouth daily. (Patient not taking: Reported on 5/28/2021), Disp: , Rfl:   NIFEdipine ER (PROCARDIA XL) 90 mg ER tablet, Take 90 mg by mouth daily. (Patient not taking: Reported on 5/28/2021), Disp: , Rfl:         Past History    Past Medical History:  Past Medical History:  No date: Chronic kidney disease     Comment:  dialysis shantelle chatterjee, sat  No date: Dialysis patient ([facility] [address]  No date: GERD (gastroesophageal reflux disease)  No date: Hypertension  No date: Kidney damage    Past Surgical History:  Past Surgical History:  No date: HX OTHER SURGICAL     Comment:  kidney removed    Family History:  Review of patient's family history indicates:  Problem: Hypertension     Relation: Mother         Age of Onset: (Not Specified)  Problem: Hypertension     Relation: Father         Age of Onset: (Not Specified)      Social History:  Social History   Tobacco Use     Smoking status: Current Every Day Smoker       Packs/day: 0.50       Years: 7.00       Pack years: 3.5     Smokeless tobacco: Never Used   Alcohol use: Yes     Alcohol/week: 11.7 standard drinks     Types: 14 Cans of beer per week     Comment: 2 beers a day   Drug use: No      Allergies:  No Known Allergies      Review of Systems  @[facility]@    Physical Exam  [unfilled]    Diagnostic Study Results    Labs -  No results found for this or any previous visit (from the past 12 hour(s)). Radiologic Studies -   No orders to display  CT Results  (Last 48 hours)   None     CXR Results  (Last 48 hours)   None       Medical Decision Making and ED Course  I am the first provider for this patient. I reviewed the vital signs, available nursing notes, past medical history, past surgical history, family history and social history. Vital Signs-Reviewed the patient's vital signs. Empty flowsheet group.         Records Reviewed: Nursing Notes and Old Medical Records    Provider Notes (Medical Decision Making):   Patient presents with chronic epigastric abdominal pain. Will check H&H. Treat blood pressure. The patient presents with differential diagnosis of gastritis, PUD, pancreatitis, ileus. ED Course:     Initial assessment performed. The patients presenting problems have been discussed, and they are in agreement with the care plan formulated and outlined with them. I have encouraged them to ask questions as they arise throughout their visit. CRITICAL CARE NOTE :  10:55 PM  Amount of Critical Care Time: ___60_(minutes)__    IMPENDING DETERIORATION -Cardiovascular  ASSOCIATED RISK FACTORS - CNS Decompensation  MANAGEMENT- Bedside Assessment and Supervision of Care  INTERPRETATION -  Blood Pressure  INTERVENTIONS - hemodynamic mngmt  CASE REVIEW -  TREATMENT RESPONSE -Improved  PERFORMED BY - Self    NOTES   :  I have spent critical care time involved in lab review, consultations with specialist, family decision- making, bedside attention and documentation. This time excludes time spent in any separate billed procedures. During this entire length of time I was immediately available to the patient . Nguyen Diaz MD        Disposition    Discharged        DISCHARGE PLAN:  1. Current Discharge Medication List    CONTINUE these medications which have NOT CHANGED    pantoprazole (PROTONIX) 20 mg tablet  Take 20 mg by mouth daily. ondansetron (Zofran ODT) 4 mg disintegrating tablet  1 Tablet by SubLINGual route every eight (8) hours as needed for Nausea or Vomiting. Qty: 20 Tablet Refills: 0    cloNIDine (CATAPRES) 0.3 mg tablet  Take 1 Tab by mouth every eight (8) hours. Qty: 21 Tab Refills: 0  Comments: Per Dr. Phoebe Yang (Nephrology)    labetalol (NORMODYNE) 100 mg tablet  Take 1 Tab by mouth every twelve (12) hours. Qty: 14 Tab Refills: 0  Comments: Per Dr. Phoebe Yang (Nephrology)    !! NIFEdipine ER (PROCARDIA XL) 90 mg ER tablet  Take 1 Tab by mouth daily.   Qty: 7 Tab Refills: 0  Comments: Per Dr. Michelle Castaneda (Nephrology)    spironolactone (ALDACTONE) 25 mg tablet  Take 1 Tab by mouth two (2) times a day. Qty: 14 Tab Refills: 0  Comments: Per Dr. Michelle Castaneda (Nephrology)    HYDROcodone-acetaminophen [facility]) 5-325 mg per tablet  Take 1 Tab by mouth every eight (8) hours as needed for Pain. Qty: 14 Tab Refills: 0    lisinopril (PRINIVIL, ZESTRIL) 20 mg tablet  Take 20 mg by mouth daily. chlorthalidone (HYGROTEN) 25 mg tablet  Take 25 mg by mouth daily. !! NIFEdipine ER (PROCARDIA XL) 90 mg ER tablet  Take 90 mg by mouth daily. !! - Potential duplicate medications found. Please discuss with provider. 2. Follow-up Information    None    3. Return to ED if worse     Diagnosis    Clinical Impression: Dyspepsia        Pop Erickson MD    Please note that this dictation was completed with [employer], the computer voice recognition software. Quite often unanticipated grammatical, syntax, homophones, and other interpretive errors are inadvertently transcribed by the computer software. Please disregard these errors. Please excuse any errors that have escaped final proofreading. Thank you.    ?            Past Medical History:   Diagnosis Date    Chronic kidney disease     dialysis tue, thur, sat    Dialysis patient ([facility] [address]     GERD (gastroesophageal reflux disease)     Hypertension     Kidney damage        Past Surgical History:   Procedure Laterality Date    HX OTHER SURGICAL      kidney removed         Family History:   Problem Relation Age of Onset    Hypertension Mother     Hypertension Father        Social History     Socioeconomic History    Marital status: SINGLE     Spouse name: Not on file    Number of children: Not on file    Years of education: Not on file    Highest education level: Not on file   Occupational History    Not on file   Tobacco Use    Smoking status: Current Every Day Smoker     Packs/day: 0.50     Years: 7.00     Pack years: 3.50    Smokeless tobacco: Never Used   Substance and Sexual Activity    Alcohol use: Yes     Alcohol/week: 11.7 standard drinks     Types: 14 Cans of beer per week     Comment: 2 beers a day    Drug use: No    Sexual activity: Not on file   Other Topics Concern    Not on file   Social History Narrative    Not on file     Social Determinants of Health     Financial Resource Strain:     Difficulty of Paying Living Expenses:    Food Insecurity:     Worried About Running Out of Food in the Last Year:     Ran Out of Food in the Last Year:    Transportation Needs:     Lack of Transportation (Medical):  Lack of Transportation (Non-Medical):    Physical Activity:     Days of Exercise per Week:     Minutes of Exercise per Session:    Stress:     Feeling of Stress :    Social Connections:     Frequency of Communication with Friends and Family:     Frequency of Social Gatherings with Friends and Family:     Attends Christian Services:     Active Member of Clubs or Organizations:     Attends Club or Organization Meetings:     Marital Status:    Intimate Partner Violence:     Fear of Current or Ex-Partner:     Emotionally Abused:     Physically Abused:     Sexually Abused: ALLERGIES: Patient has no known allergies. Review of Systems   Constitutional: Negative. HENT: Negative. Eyes: Negative. Respiratory: Negative. Cardiovascular: Negative. Gastrointestinal: Positive for abdominal pain and vomiting. Endocrine: Negative. Genitourinary: Negative. Musculoskeletal: Negative. Skin: Negative. Allergic/Immunologic: Negative. Neurological: Negative. Hematological: Negative. Psychiatric/Behavioral: Negative. Vitals:    07/02/21 2234   BP: (!) 227/130   Pulse: 91   Resp: 18   Temp: 98.3 °F (36.8 °C)   SpO2: 100%            Physical Exam  Vitals and nursing note reviewed. Constitutional:       Appearance: Normal appearance. He is normal weight.    HENT:      Head: Normocephalic and atraumatic. Right Ear: Tympanic membrane, ear canal and external ear normal.      Left Ear: Tympanic membrane, ear canal and external ear normal.      Nose: Nose normal.      Mouth/Throat:      Mouth: Mucous membranes are moist.      Pharynx: Oropharynx is clear. Eyes:      Extraocular Movements: Extraocular movements intact. Conjunctiva/sclera: Conjunctivae normal.      Pupils: Pupils are equal, round, and reactive to light. Cardiovascular:      Rate and Rhythm: Normal rate and regular rhythm. Pulses: Normal pulses. Heart sounds: Normal heart sounds. Pulmonary:      Effort: Pulmonary effort is normal.      Breath sounds: Normal breath sounds. Abdominal:      General: Abdomen is flat. Bowel sounds are normal.      Palpations: Abdomen is soft. Tenderness: There is abdominal tenderness in the epigastric area. Musculoskeletal:         General: Normal range of motion. Cervical back: Normal range of motion and neck supple. Skin:     General: Skin is warm and dry. Neurological:      General: No focal deficit present. Mental Status: He is alert and oriented to person, place, and time.    Psychiatric:         Mood and Affect: Mood normal.         Behavior: Behavior normal.          MDM  Number of Diagnoses or Management Options     Amount and/or Complexity of Data Reviewed  Clinical lab tests: reviewed    Risk of Complications, Morbidity, and/or Mortality  Presenting problems: high  Diagnostic procedures: moderate  Management options: high    Patient Progress  Patient progress: improved         Procedures

## 2021-07-11 NOTE — ED PROVIDER NOTES
EMERGENCY DEPARTMENT HISTORY AND PHYSICAL EXAM  ?    Date: 7/10/2021  Patient Name: Conner Gallegos    History of Presenting Illness    Patient presents with:  Chest Pain      History Provided By: Patient    HPI: Conner Gallegos, 41 y.o. male with a past medical history significant hypertension and renal insufficiency, hemo-dialysis presents to the ED with cc of right-sided sharp chest pain for 2 to 3 days. Started at rest.  Pain is worse with palpation. Does not radiate. He denies shortness of breath. Smokes 2-3 cigarettes/day. There are no other complaints, changes, or physical findings at this time. PCP: None    Current Outpatient Medications:  pantoprazole (PROTONIX) 20 mg tablet, Take 20 mg by mouth daily. , Disp: , Rfl:   ondansetron (Zofran ODT) 4 mg disintegrating tablet, 1 Tablet by SubLINGual route every eight (8) hours as needed for Nausea or Vomiting., Disp: 20 Tablet, Rfl: 0  cloNIDine (CATAPRES) 0.3 mg tablet, Take 1 Tab by mouth every eight (8) hours. (Patient not taking: Reported on 5/28/2021), Disp: 21 Tab, Rfl: 0  labetalol (NORMODYNE) 100 mg tablet, Take 1 Tab by mouth every twelve (12) hours. , Disp: 14 Tab, Rfl: 0  NIFEdipine ER (PROCARDIA XL) 90 mg ER tablet, Take 1 Tab by mouth daily. (Patient not taking: Reported on 5/28/2021), Disp: 7 Tab, Rfl: 0  spironolactone (ALDACTONE) 25 mg tablet, Take 1 Tab by mouth two (2) times a day. (Patient not taking: Reported on 5/28/2021), Disp: 14 Tab, Rfl: 0  HYDROcodone-acetaminophen (NORCO) 5-325 mg per tablet, Take 1 Tab by mouth every eight (8) hours as needed for Pain. (Patient not taking: Reported on 5/28/2021), Disp: 14 Tab, Rfl: 0  lisinopril (PRINIVIL, ZESTRIL) 20 mg tablet, Take 20 mg by mouth daily. (Patient not taking: Reported on 5/28/2021), Disp: , Rfl:   chlorthalidone (HYGROTEN) 25 mg tablet, Take 25 mg by mouth daily.  (Patient not taking: Reported on 5/28/2021), Disp: , Rfl:   NIFEdipine ER (PROCARDIA XL) 90 mg ER tablet, Take 90 mg by mouth daily. (Patient not taking: Reported on 5/28/2021), Disp: , Rfl:         Past History    Past Medical History:  Past Medical History:  No date: Chronic kidney disease     Comment:  dialysis tue, thur, sat  No date: Dialysis patient ([address]  No date: GERD (gastroesophageal reflux disease)  No date: Hypertension  No date: Kidney damage    Past Surgical History:  Past Surgical History:  No date: HX OTHER SURGICAL     Comment:  kidney removed    Family History:  Review of patient's family history indicates:  Problem: Hypertension     Relation: Mother         Age of Onset: (Not Specified)  Problem: Hypertension     Relation: Father         Age of Onset: (Not Specified)      Social History:  Social History   Tobacco Use     Smoking status: Current Every Day Smoker       Packs/day: 0.50       Years: 7.00       Pack years: 3.5     Smokeless tobacco: Never Used   Alcohol use:  Yes     Alcohol/week: 11.7 standard drinks     Types: 14 Cans of beer per week     Comment: 2 beers a day   Drug use: No      Allergies:  No Known Allergies      Review of Systems  @[facility]@    Physical Exam  [unfilled]    Diagnostic Study Results    Labs -  Recent Results (from the past 12 hour(s))  -EKG, 12 LEAD, INITIAL  Collection Time: 07/10/21 11:35 PM      Result                      Value             Ref Range          Ventricular Rate            96                BPM                Atrial Rate                 96                BPM                P-R Interval                154               ms                 QRS Duration                88                ms                 Q-T Interval                357               ms                 QTC Calculation (Bezet)     452               ms                 Calculated P Axis           55                degrees            Calculated R Axis           -18               degrees            Calculated T Axis           67                degrees            Diagnosis Sinus rhythm Probable left atrial enlargement Borderline left axis deviation     Radiologic Studies -   No orders to display  CT Results  (Last 48 hours)   None     CXR Results  (Last 48 hours)   None       Medical Decision Making and ED Course  I am the first provider for this patient. I reviewed the vital signs, available nursing notes, past medical history, past surgical history, family history and social history. Vital Signs-Reviewed the patient's vital signs. Empty flowsheet group. EKG interpretation: (Preliminary)  Rhythm: normal sinus rhythm; and regular . Rate (approx.): 96; Axis: left axis deviation; HI interval: normal; QRS interval: normal ; ST/T wave: normal; Other findings: borderline ekg. Records Reviewed: Nursing Notes and Old Medical Records    Provider Notes (Medical Decision Making): We will rule out ACS. Check volume status. The patient presents with differential diagnosis of ACS, chest wall pain, pneumothorax. ED Course:   Exam suggests chest wall pain. Chest x-ray is clear. Hemoglobin is 5.7. There is no obvious source of bleeding. Patient was typed and crossmatched. Transfusion initiated. During transfusion, patient became tachycardic, and vomited. Blood transfusion was stopped. Patient denied shortness of breath. Transfusion incident was discussed with pathologist.  Patient is not having obvious signs and symptoms of a transfusion reaction. Patient became impatient and demanded to be released. I could not convince him to stay. He is aware of the critical low blood count. He was also aware of the possibility of a transfusion reaction. He said \" I do not care. \"    Initial assessment performed. The patients presenting problems have been discussed, and they are in agreement with the care plan formulated and outlined with them. I have encouraged them to ask questions as they arise throughout their visit.               CRITICAL CARE NOTE :  11:52 PM  Amount of Critical Care Time: ____45(minutes)__    IMPENDING DETERIORATION -Cardiovascular  ASSOCIATED RISK FACTORS - Vascular Compromise  MANAGEMENT- Bedside Assessment and Supervision of Care  INTERPRETATION -  Xrays and Blood Pressure  INTERVENTIONS - Metobolic interventions  CASE REVIEW - Medical Sub-Specialist  TREATMENT RESPONSE -Unchanged   PERFORMED BY - Self    NOTES   :  I have spent critical care time involved in lab review, consultations with specialist, family decision- making, bedside attention and documentation. This time excludes time spent in any separate billed procedures. During this entire length of time I was immediately available to the patient . James Diaz MD        Disposition    [facility] discharge        DISCHARGE PLAN:  1. Current Discharge Medication List    CONTINUE these medications which have NOT CHANGED    pantoprazole (PROTONIX) 20 mg tablet  Take 20 mg by mouth daily. ondansetron (Zofran ODT) 4 mg disintegrating tablet  1 Tablet by SubLINGual route every eight (8) hours as needed for Nausea or Vomiting. Qty: 20 Tablet Refills: 0    cloNIDine (CATAPRES) 0.3 mg tablet  Take 1 Tab by mouth every eight (8) hours. Qty: 21 Tab Refills: 0  Comments: Per Dr. Solomon Simms (Nephrology)    labetalol (NORMODYNE) 100 mg tablet  Take 1 Tab by mouth every twelve (12) hours. Qty: 14 Tab Refills: 0  Comments: Per Dr. Solomon Simms (Nephrology)    !! NIFEdipine ER (PROCARDIA XL) 90 mg ER tablet  Take 1 Tab by mouth daily. Qty: 7 Tab Refills: 0  Comments: Per Dr. Solomon Simms (Nephrology)    spironolactone (ALDACTONE) 25 mg tablet  Take 1 Tab by mouth two (2) times a day. Qty: 14 Tab Refills: 0  Comments: Per Dr. Solomon Simms (Nephrology)    HYDROcodone-acetaminophen [facility]) 5-325 mg per tablet  Take 1 Tab by mouth every eight (8) hours as needed for Pain. Qty: 14 Tab Refills: 0    lisinopril (PRINIVIL, ZESTRIL) 20 mg tablet  Take 20 mg by mouth daily.     chlorthalidone (HYGROTEN) 25 mg tablet  Take 25 mg by mouth daily. !! NIFEdipine ER (PROCARDIA XL) 90 mg ER tablet  Take 90 mg by mouth daily. !! - Potential duplicate medications found. Please discuss with provider. 2. Follow-up Information    None    3. Return to ED if worse     Diagnosis    Clinical Impression: Severe anemia      Catherine Ward MD    Please note that this dictation was completed with [employer], the computer voice recognition software. Quite often unanticipated grammatical, syntax, homophones, and other interpretive errors are inadvertently transcribed by the computer software. Please disregard these errors. Please excuse any errors that have escaped final proofreading. Thank you.    ? Past Medical History:   Diagnosis Date    Chronic kidney disease     dialysis [facility], sat    Dialysis patient ([facility] [address]     GERD (gastroesophageal reflux disease)     Hypertension     Kidney damage        Past Surgical History:   Procedure Laterality Date    HX OTHER SURGICAL      kidney removed         Family History:   Problem Relation Age of Onset    Hypertension Mother     Hypertension Father        Social History     Socioeconomic History    Marital status: SINGLE     Spouse name: Not on file    Number of children: Not on file    Years of education: Not on file    Highest education level: Not on file   Occupational History    Not on file   Tobacco Use    Smoking status: Current Every Day Smoker     Packs/day: 0.50     Years: 7.00     Pack years: 3.50    Smokeless tobacco: Never Used   Substance and Sexual Activity    Alcohol use:  Yes     Alcohol/week: 11.7 standard drinks     Types: 14 Cans of beer per week     Comment: 2 beers a day    Drug use: No    Sexual activity: Not on file   Other Topics Concern    Not on file   Social History Narrative    Not on file     Social Determinants of Health     Financial Resource Strain:     Difficulty of Paying Living Expenses:    Food Insecurity:     Worried About Running Out of Food in the Last Year:     Christina of Food in the Last Year:    Transportation Needs:     Lack of Transportation (Medical):  Lack of Transportation (Non-Medical):    Physical Activity:     Days of Exercise per Week:     Minutes of Exercise per Session:    Stress:     Feeling of Stress :    Social Connections:     Frequency of Communication with Friends and Family:     Frequency of Social Gatherings with Friends and Family:     Attends [employer] Services:     Active Member of Clubs or Organizations:     Attends Club or Organization Meetings:     Marital Status:    Intimate Partner Violence:     Fear of Current or Ex-Partner:     Emotionally Abused:     Physically Abused:     Sexually Abused: ALLERGIES: Patient has no known allergies. Review of Systems   Constitutional: Negative. HENT: Negative. Eyes: Negative. Respiratory: Negative. Cardiovascular: Positive for chest pain. Gastrointestinal: Negative. Endocrine: Negative. Genitourinary: Negative. Musculoskeletal: Negative. Allergic/Immunologic: Negative. Neurological: Negative. Hematological: Negative. Psychiatric/Behavioral: Negative. Vitals:    07/10/21 2346   BP: (!) 168/109   Pulse: 93   Resp: 16   Temp: 98.2 °F (36.8 °C)   SpO2: 99%   Weight: 61.2 kg (135 lb)   Height: 5' 5\" (1.651 m)            Physical Exam  Vitals and nursing note reviewed. Constitutional:       Appearance: Normal appearance. He is well-developed and normal weight. HENT:      Head: Normocephalic and atraumatic. Right Ear: Tympanic membrane, ear canal and external ear normal.      Left Ear: Tympanic membrane, ear canal and external ear normal.      Nose: Nose normal.      Mouth/Throat:      Mouth: Mucous membranes are moist.      Pharynx: Oropharynx is clear. Eyes:      Extraocular Movements: Extraocular movements intact.       Conjunctiva/sclera: Conjunctivae normal.      Pupils: Pupils are equal, round, and reactive to light. Cardiovascular:      Rate and Rhythm: Normal rate and regular rhythm. Pulses: Normal pulses. Heart sounds: Murmur heard. Crescendo systolic murmur is present with a grade of 2/6. Pulmonary:      Effort: Pulmonary effort is normal.      Breath sounds: Normal breath sounds. Chest:      Chest wall: Tenderness present. Comments: Marked reproducible tenderness right pectoralis. Abdominal:      General: Abdomen is flat. Bowel sounds are normal.      Palpations: Abdomen is soft. Genitourinary:     Rectum: Guaiac result negative. Musculoskeletal:         General: Normal range of motion. Cervical back: Normal range of motion and neck supple. Skin:     General: Skin is warm and dry. Neurological:      General: No focal deficit present. Mental Status: He is alert and oriented to person, place, and time. Psychiatric:         Mood and Affect: Mood normal.         Behavior: Behavior normal.          [facility]  ED Course as of Jul 11 0609   Sun Jul 11, 2021   7205 Discussed case with Dr. Linda Cuellar.     [RA]      ED Course User Index  [RA] Robi Velasco MD       Procedures

## 2021-07-11 NOTE — ED TRIAGE NOTES
Patient presents to ED reporting substernal right sided chest pain and shortness of breath for 2-3 days. Patient reports nothing helps or exacerbates symptoms. Patient is a hemodialysis patient of Dr. Conan Babinski (T,T,S).

## 2021-07-11 NOTE — ED NOTES
Patient started reporting nausea and vomited 100ml of emesis at this time. Patient became tachycardic at 134 and hypertensive at 187/124. Blood transfusion stopped. Line flushed. 0.9% normal saline IV fluids running at 75ml//hr. Dr. Aldo Tafoya called and at bedside to see patient. Nursing supervisor, Bradley Enriquez and Mike Tapia, lab made aware. All tubing and blood products packaged and sent back to lab. Blood specimens drawn for transfusion reaction panel and sent to lab.

## 2021-07-11 NOTE — ED NOTES
Patient states, \"I am mad ya'll stopped my blood. If ya'll arent going to start it back right now, I am leaving here. Ya'll don't know what your doing. I am going to another hospital.\"  Explained to patient the suspected transfusion reaction and educated patient on seriousness of transfusion reactions. Patient refused education at this time. Dr. Cheri Dent at bedside to discuss risks of leaving against medical advice.

## 2021-07-15 NOTE — ED PROVIDER NOTES
EMERGENCY DEPARTMENT HISTORY AND PHYSICAL EXAM      Date: 7/15/2021  Patient Name: Ana María Lora    History of Presenting Illness     Chief Complaint   Patient presents with    Chest Pain       History Provided By: Patient    HPI: Ana María Lora, 41 y.o. male with a past medical history significant diabetes, hypertension and renal insufficiency presents to the ED with cc of right-sided chest pain described as an ache over the last 3 days no associated shortness of breath, pain is worsened with movement when he moves his arms; patient states this morning he was awakened by pounding sensation in his chest and he felt nauseated    There are no other complaints, changes, or physical findings at this time. PCP: None    No current facility-administered medications on file prior to encounter. Current Outpatient Medications on File Prior to Encounter   Medication Sig Dispense Refill    pantoprazole (PROTONIX) 20 mg tablet Take 20 mg by mouth daily.  ondansetron (Zofran ODT) 4 mg disintegrating tablet 1 Tablet by SubLINGual route every eight (8) hours as needed for Nausea or Vomiting. 20 Tablet 0    cloNIDine (CATAPRES) 0.3 mg tablet Take 1 Tab by mouth every eight (8) hours. (Patient not taking: Reported on 5/28/2021) 21 Tab 0    labetalol (NORMODYNE) 100 mg tablet Take 1 Tab by mouth every twelve (12) hours. 14 Tab 0    NIFEdipine ER (PROCARDIA XL) 90 mg ER tablet Take 1 Tab by mouth daily. (Patient not taking: Reported on 5/28/2021) 7 Tab 0    spironolactone (ALDACTONE) 25 mg tablet Take 1 Tab by mouth two (2) times a day. (Patient not taking: Reported on 5/28/2021) 14 Tab 0    HYDROcodone-acetaminophen (NORCO) 5-325 mg per tablet Take 1 Tab by mouth every eight (8) hours as needed for Pain. (Patient not taking: Reported on 5/28/2021) 14 Tab 0    lisinopril (PRINIVIL, ZESTRIL) 20 mg tablet Take 20 mg by mouth daily.  (Patient not taking: Reported on 5/28/2021)      chlorthalidone (HYGROTEN) 25 mg tablet Take 25 mg by mouth daily. (Patient not taking: Reported on 5/28/2021)      NIFEdipine ER (PROCARDIA XL) 90 mg ER tablet Take 90 mg by mouth daily. (Patient not taking: Reported on 5/28/2021)         Past History     Past Medical History:  Past Medical History:   Diagnosis Date    Chronic kidney disease     dialysis tue, thur, sat    Dialysis patient ([address]     GERD (gastroesophageal reflux disease)     Hypertension     Kidney damage        Past Surgical History:  Past Surgical History:   Procedure Laterality Date    HX OTHER SURGICAL      kidney removed       Family History:  Family History   Problem Relation Age of Onset    Hypertension Mother     Hypertension Father        Social History:  Social History     Tobacco Use    Smoking status: Current Every Day Smoker     Packs/day: 0.50     Years: 7.00     Pack years: 3.50    Smokeless tobacco: Never Used   Substance Use Topics    Alcohol use: Yes     Alcohol/week: 11.7 standard drinks     Types: 14 Cans of beer per week     Comment: 2 beers a day    Drug use: No       Allergies:  No Known Allergies      Review of Systems     Review of Systems   Constitutional: Negative for chills and fever. HENT: Negative for rhinorrhea and sore throat. Eyes: Negative for pain and visual disturbance. Respiratory: Negative for cough and shortness of breath. Cardiovascular: Positive for chest pain. Negative for leg swelling. Gastrointestinal: Negative for abdominal pain and vomiting. Endocrine: Negative for polydipsia and polyuria. Genitourinary: Negative for dysuria and urgency. Musculoskeletal: Negative for back pain and neck pain. Skin: Negative for color change and pallor. Neurological: Negative for dizziness, syncope, weakness, light-headedness and numbness. Psychiatric/Behavioral: Negative. Physical Exam     Physical Exam  Vitals and nursing note reviewed. Constitutional:       General: He is not in acute distress.      Appearance: He is not ill-appearing, toxic-appearing or diaphoretic. HENT:      Head: Normocephalic and atraumatic. Cardiovascular:      Heart sounds: Murmur heard. Chest:       Musculoskeletal:      Cervical back: Normal range of motion and neck supple. Neurological:      Mental Status: He is alert. Lab and Diagnostic Study Results     Labs -   No results found for this or any previous visit (from the past 12 hour(s)). Radiologic Studies -   @lastxrresult@  CT Results  (Last 48 hours)    None        CXR Results  (Last 48 hours)    None            Medical Decision Making   - I am the first provider for this patient. - I reviewed the vital signs, available nursing notes, past medical history, past surgical history, family history and social history. - Initial assessment performed. The patients presenting problems have been discussed, and they are in agreement with the care plan formulated and outlined with them. I have encouraged them to ask questions as they arise throughout their visit. Vital Signs-Reviewed the patient's vital signs. Patient Vitals for the past 12 hrs:   Temp Pulse Resp BP SpO2   07/15/21 0940 99.4 °F (37.4 °C) (!) 103 18 (!) 167/109 100 %       Records Reviewed: Nursing Notes    The patient presents with chest pain with a differential diagnosis of  ACS, pulmonary edema/CHF and pnuemonia      ED Course:     ED Course as of Jul 15 1039   Thu Jul 15, 2021   0957 EKG no changes from the EKG done 7/10/2021    [SB]      ED Course User Index  [SB] Marla Mead MD       Provider Notes (Medical Decision Making): MDM       Procedures   Medical Decision Makingedical Decision Making  Performed by: Caryn Payne MD  PROCEDURES:  Procedures       Disposition   Disposition: Condition stable and improved  DC- Adult Discharges: All of the diagnostic tests were reviewed and questions answered. Diagnosis, care plan and treatment options were discussed.   The patient understands the instructions and will follow up as directed. The patients results have been reviewed with them. They have been counseled regarding their diagnosis. The patient verbally convey understanding and agreement of the signs, symptoms, diagnosis, treatment and prognosis and additionally agrees to follow up as recommended with their PCP in 24 - 48 hours. They also agree with the care-plan and convey that all of their questions have been answered. I have also put together some discharge instructions for them that include: 1) educational information regarding their diagnosis, 2) how to care for their diagnosis at home, as well a 3) list of reasons why they would want to return to the ED prior to their follow-up appointment, should their condition change. DISCHARGE PLAN:  1. Current Discharge Medication List      CONTINUE these medications which have NOT CHANGED    Details   pantoprazole (PROTONIX) 20 mg tablet Take 20 mg by mouth daily. ondansetron (Zofran ODT) 4 mg disintegrating tablet 1 Tablet by SubLINGual route every eight (8) hours as needed for Nausea or Vomiting. Qty: 20 Tablet, Refills: 0      cloNIDine (CATAPRES) 0.3 mg tablet Take 1 Tab by mouth every eight (8) hours. Qty: 21 Tab, Refills: 0    Comments: Per Dr. Shalini Blount (Nephrology)      labetalol (NORMODYNE) 100 mg tablet Take 1 Tab by mouth every twelve (12) hours. Qty: 14 Tab, Refills: 0    Comments: Per Dr. Shalini Blount (Nephrology)      !! NIFEdipine ER (PROCARDIA XL) 90 mg ER tablet Take 1 Tab by mouth daily. Qty: 7 Tab, Refills: 0    Comments: Per Dr. Shalini Bolunt (Nephrology)      spironolactone (ALDACTONE) 25 mg tablet Take 1 Tab by mouth two (2) times a day. Qty: 14 Tab, Refills: 0    Comments: Per Dr. Shalini Blount (Nephrology)      HYDROcodone-acetaminophen [facility]) 5-325 mg per tablet Take 1 Tab by mouth every eight (8) hours as needed for Pain.   Qty: 14 Tab, Refills: 0      lisinopril (PRINIVIL, ZESTRIL) 20 mg tablet Take 20 mg by mouth daily. chlorthalidone (HYGROTEN) 25 mg tablet Take 25 mg by mouth daily. !! NIFEdipine ER (PROCARDIA XL) 90 mg ER tablet Take 90 mg by mouth daily. !! - Potential duplicate medications found. Please discuss with provider. 2.   Follow-up Information    None       3. Return to ED if worse   4. Current Discharge Medication List            Diagnosis     Clinical Impression: No diagnosis found. Attestations:    Ruth Mata MD    Please note that this dictation was completed with [employer], the computer voice recognition software. Quite often unanticipated grammatical, syntax, homophones, and other interpretive errors are inadvertently transcribed by the computer software. Please disregard these errors. Please excuse any errors that have escaped final proofreading. Thank you.

## 2021-07-15 NOTE — ED NOTES
Dr. Jesse Tsang reviewed discharge instructions with the patient. The patient verbalized understanding. All questions and concerns were addressed. The patient declined a wheelchair and is discharged ambulatory in the care of family members with instructions and prescriptions in hand. Pt is alert and oriented x 4. Respirations are clear and unlabored.

## 2021-07-15 NOTE — ED TRIAGE NOTES
Patient reports right sided chest pain that is worse with movement that has been present for a few days. Reports that it is a burning sensation. Reports hx of reflux & has appointment to see GI in a few weeks. Denies cardiac hx. Reports that he was also supposed to have dialysis today at 10am but came to ER instead.

## 2021-07-16 NOTE — PROGRESS NOTES
Educated patient about risk for severe COVID-19 due to risk factors according to CDC guidelines. LPN CC reviewed discharge instructions, medical action plan and red flag symptoms with the patient who verbalized understanding. Discussed COVID vaccination status: no. Education provided on COVID-19 vaccination as appropriate. Discussed exposure protocols and quarantine with CDC Guidelines. Patient was given an opportunity to verbalize any questions and concerns and agrees to contact LPN CC or health care provider for questions related to their healthcare.   Patient was not tested for COVID and has a follow up today with cardiology

## 2021-07-21 NOTE — ED NOTES
Attempted to call report, spoke with Kang Peters LPN, states that nurse that is received patient is in another patient's room & she would call back to receive report. 1535 - Report called to Samantha Corea RN at this time.

## 2021-07-21 NOTE — ROUTINE PROCESS
TRANSFER - OUT REPORT:    Verbal report given to [address] [facility] on Raymn[address] Dao  being transferred to [facility] for routine progression of care       Report consisted of patients Situation, Background, Assessment and   Recommendations(SBAR). Information from the following report(s) SBAR, ED Summary, Recent Results, Med Rec Status and Cardiac Rhythm NSR was reviewed with the receiving nurse. Lines:   Peripheral IV 07/21/21 Anterior;Right Forearm (Active)   Site Assessment Clean, dry, & intact 07/21/21 1433   Phlebitis Assessment 0 07/21/21 1433   Infiltration Assessment 0 07/21/21 1433   Dressing Status Clean, dry, & intact 07/21/21 1433   Dressing Type Tape;Transparent 07/21/21 1433   Hub Color/Line Status Pink 07/21/21 1433        Opportunity for questions and clarification was provided.       Patient transported with:   Registered Nurse

## 2021-07-21 NOTE — H&P
History & Physical    Primary Care Provider: None  Source of Information: Patient    History of Presenting Illness:   Mari Keller is a 41 y.o. male with history of end-stage renal disease on hemodialysis Tuesday Thursday Saturdays, essential hypertension and chronic anemia presenting from outpatient dialysis for blood transfusion. Patient had routine blood work done outpatient and was noted with a hemoglobin of 5.2. Denies hematemesis, hematochezia or melanotic stools. History of intermittent chest pain without shortness of breath. No cough or productive sputum. No fevers or chills. Chest x-ray negative for obvious consolidation. He will be admitted for blood transfusion. He is scheduled for outpatient endoscopy on July 23 with Dr. Breana Savage of Systems:  A comprehensive review of systems was negative except for that written in the History of Present Illness. Past Medical History:   Diagnosis Date    Chronic kidney disease     dialysis tue, thur, sat    Dialysis patient ([facility] [address]     GERD (gastroesophageal reflux disease)     Hypertension     Kidney damage       Past Surgical History:   Procedure Laterality Date    HX OTHER SURGICAL      kidney removed     Prior to Admission medications    Medication Sig Start Date End Date Taking? Authorizing Provider   pantoprazole (PROTONIX) 20 mg tablet Take 20 mg by mouth daily. Other, MD Bonny   ondansetron (Zofran ODT) 4 mg disintegrating tablet 1 Tablet by SubLINGual route every eight (8) hours as needed for Nausea or Vomiting. 6/21/21   Prasad Holloway MD   cloNIDine (CATAPRES) 0.3 mg tablet Take 1 Tab by mouth every eight (8) hours. Patient not taking: Reported on 5/28/2021 3/5/14   Marisol Childers PA-C   labetalol (NORMODYNE) 100 mg tablet Take 1 Tab by mouth every twelve (12) hours. 3/5/14   Marisol Childers PA-C   NIFEdipine ER (PROCARDIA XL) 90 mg ER tablet Take 1 Tab by mouth daily.   Patient not taking: Reported on 5/28/2021 3/5/14   Oletha Rinne, PA-C   spironolactone (ALDACTONE) 25 mg tablet Take 1 Tab by mouth two (2) times a day. Patient not taking: Reported on 5/28/2021 3/5/14   Oletha Rinne, PA-C   HYDROcodone-acetaminophen [facility]) 5-325 mg per tablet Take 1 Tab by mouth every eight (8) hours as needed for Pain. Patient not taking: Reported on 5/28/2021 3/5/14   Oletha Rinne, PA-C   lisinopril (PRINIVIL, ZESTRIL) 20 mg tablet Take 20 mg by mouth daily. Patient not taking: Reported on 5/28/2021    Provider, Historical   chlorthalidone (HYGROTEN) 25 mg tablet Take 25 mg by mouth daily. Patient not taking: Reported on 5/28/2021    Provider, Historical   NIFEdipine ER (PROCARDIA XL) 90 mg ER tablet Take 90 mg by mouth daily. Patient not taking: Reported on 5/28/2021    Provider, Historical     No Known Allergies   Family History   Problem Relation Age of Onset    Hypertension Mother     Hypertension Father         SOCIAL HISTORY:  Patient resides:  Independently    Assisted Living    SNF    With family care       Smoking history:   None    Former    Chronic      Alcohol history:   None    Social    Chronic      Ambulates:   Independently    w/cane    w/walker    w/wc    CODE STATUS:  DNR    Full    Other      Objective:     Physical Exam:     Visit Vitals  /89 (BP 1 Location: Right upper arm, BP Patient Position: At rest)   Pulse 93   Temp 100.3 °F (37.9 °C)   Resp 18   Wt 63.5 kg (140 lb)   SpO2 100%   BMI 23.30 kg/m²      O2 Device: None (Room air)    General:  Alert, cooperative, no distress, appears stated age. Head:  Normocephalic, without obvious abnormality, atraumatic. Eyes:  Conjunctivae/corneas clear. PERRL, EOMs intact. Nose: Nares normal. Septum midline. Mucosa normal. No drainage or sinus tenderness.    Throat: Lips, mucosa, and tongue normal. Teeth and gums normal.   Neck: Supple, symmetrical, trachea midline, no adenopathy, thyroid: no enlargement/tenderness/nodules, no carotid bruit and no JVD. Back:   Symmetric, no curvature. ROM normal. No CVA tenderness. Lungs:   Clear to auscultation bilaterally. Chest wall:  No tenderness or deformity. Heart:  Regular rate and rhythm, S1, S2 normal, no murmur, click, rub or gallop. Abdomen:   Soft, non-tender. Bowel sounds normal. No masses,  No organomegaly. Extremities: Extremities normal, atraumatic, no cyanosis or edema. Pulses: 2+ and symmetric all extremities. Skin: Skin color, texture, turgor normal. No rashes or lesions   Neurologic: CNII-XII intact. No motor or sensory deficits. EKG:  nonspecific ST and T waves changes. Data Review:     Recent Days:  Recent Labs     07/21/21  1430   WBC 6.3   HGB 5.7*   HCT 16.5*        Recent Labs     07/21/21  1430      K 3.7      CO2 31   GLU 93   BUN 23*   CREA 8.57*   CA 8.7   ALB 2.9*   TBILI 0.4   ALT 6*   INR 1.1     No results for input(s): PH, PCO2, PO2, HCO3, FIO2 in the last 72 hours. 24 Hour Results:  Recent Results (from the past 24 hour(s))   CBC WITH AUTOMATED DIFF    Collection Time: 07/21/21  2:30 PM   Result Value Ref Range    WBC 6.3 4.4 - 11.3 K/uL    RBC 1.69 M/uL    HGB 5.7 (L) 13.0 - 16.0 g/dL    HCT 16.5 (L) 41 - 53 %    MCV 97.6 80 - 100 FL    MCH 33.7 31 - 34 PG    MCHC 34.6 31.0 - 36.0 g/dL    RDW 14.4 11.5 - 14.5 %    PLATELET 923 K/uL    MPV 7.3 6.5 - 11.5 FL    NEUTROPHILS 75 42 - 75 %    LYMPHOCYTES 20 (L) 20.5 - 51.1 %    MONOCYTES 4 3.1 - 13.9 %    EOSINOPHILS 1 0.9 - 2.9 %    BASOPHILS 0 0.0 - 2.5 %    ABS. NEUTROPHILS 4.8 1.8 - 7.7 K/UL    ABS. LYMPHOCYTES 1.2 1.0 - 4.8 K/UL    ABS. MONOCYTES 0.2 0.0 - 0.8 K/UL    ABS. EOSINOPHILS 0.1 0.0 - 0.7 K/UL    ABS.  BASOPHILS 0.0 0.0 - 0.2 K/UL   METABOLIC PANEL, COMPREHENSIVE    Collection Time: 07/21/21  2:30 PM   Result Value Ref Range    Sodium 141 136 - 145 mmol/L    Potassium 3.7 3.5 - 5.1 mmol/L    Chloride 102 97 - 108 mmol/L    CO2 31 21 - 32 mmol/L    Anion gap 8 5 - 15 mmol/L    Glucose 93 65 - 100 mg/dL    BUN 23 (H) 6 - 20 mg/dL    Creatinine 8.57 (H) 0.70 - 1.30 mg/dL    BUN/Creatinine ratio 3 (L) 12 - 20      GFR est AA 8 (L) >60 ml/min/1.73m2    GFR est non-AA 7 (L) >60 ml/min/1.73m2    Calcium 8.7 8.5 - 10.1 mg/dL    Bilirubin, total 0.4 0.2 - 1.0 mg/dL    AST (SGOT) 11 (L) 15 - 37 U/L    ALT (SGPT) 6 (L) 12 - 78 U/L    Alk. phosphatase 57 45 - 117 U/L    Protein, total 6.3 (L) 6.4 - 8.2 g/dL    Albumin 2.9 (L) 3.5 - 5.0 g/dL    Globulin 3.4 2.0 - 4.0 g/dL    A-G Ratio 0.9 (L) 1.1 - 2.2     TROPONIN I    Collection Time: 07/21/21  2:30 PM   Result Value Ref Range    Troponin-I, Qt. <0.05 <0.05 ng/mL   PROTHROMBIN TIME + INR    Collection Time: 07/21/21  2:30 PM   Result Value Ref Range    Prothrombin time 11.0 9.0 - 11.1 sec    INR 1.1 0.9 - 1.1     EKG, 12 LEAD, INITIAL    Collection Time: 07/21/21  2:35 PM   Result Value Ref Range    Ventricular Rate 91 BPM    Atrial Rate 91 BPM    P-R Interval 161 ms    QRS Duration 88 ms    Q-T Interval 380 ms    QTC Calculation (Bezet) 468 ms    Calculated P Axis 34 degrees    Calculated R Axis -19 degrees    Calculated T Axis 56 degrees    Diagnosis       Sinus rhythm  Left atrial enlargement  Left ventricular hypertrophy           Imaging:   XR CHEST PORT   Final Result   No plain film evidence for CHF or pneumonia. Assessment:     Acute on chronic anemia    -Probably anemia of chronic disease related to end-stage renal disease    -Type and screen 2 units of packed red blood cells and transfuse    -Obtain iron levels    -Patient will benefit from Procriit during routine hemodialysis    -Recommend upper endoscopy to rule out any lesions    Benign essential hypertension    -Blood pressure very stable at this time    -Resume home dose antihypertensives and adjust as tolerated        Plan:     Admit to medical floor observation  Transfuse 2 units of packed red blood cells  Consult nephrologist for electrolyte management. He is scheduled for hemodialysis in a.m.   Encourage follow-up outpatient with Dr. Sanabria Plan for upper endoscopy on Friday  Avoid anticoagulants due to significant anemia  He is full code  Anticipate 24-hour in hospital stay and discharge  Signed By: Aaron Day MD     July 21, 2021

## 2021-07-21 NOTE — ED TRIAGE NOTES
Patient reports that he had dialysis yesterday & they called today to tell him that his hemoglobin was low. Patient states that he has had to get transfusions in the past.  Patient reports generalized weakness. Denies SOB.   Temperature at triage is 100.3, denies flu like symptoms

## 2021-07-21 NOTE — ED PROVIDER NOTES
EMERGENCY DEPARTMENT HISTORY AND PHYSICAL EXAM      Date: 7/21/2021  Patient Name: Eitan Ochoa    History of Presenting Illness     Chief Complaint   Patient presents with    Anemia       History Provided By: Patient    HPI: Eitan Ochoa, 41 y.o. male with a past medical history significant hypertension, renal insufficiency and End-stage renal disease on hemodialysis GERD presents to the ED with cc of low hemoglobin. The patient was noted to have a hemoglobin of 5 on a blood drawn yesterday at the dialysis center he was called by the dialysis nurse and told to come to the emergency department for transfusion. He has required transfusions in the past the last being approximately 1 year ago here at this facility. He notes loss of exercise tolerance and dyspnea on exertion. He has chronic GERD symptoms occasional vomiting and recently has had a small amount of bright red blood mixed with his emesis. He denies abdominal pain melena significant hematochezia or other concerning symptoms. There are no other complaints, changes, or physical findings at this time. PCP: None    No current facility-administered medications on file prior to encounter. Current Outpatient Medications on File Prior to Encounter   Medication Sig Dispense Refill    pantoprazole (PROTONIX) 20 mg tablet Take 20 mg by mouth daily.  ondansetron (Zofran ODT) 4 mg disintegrating tablet 1 Tablet by SubLINGual route every eight (8) hours as needed for Nausea or Vomiting. 20 Tablet 0    cloNIDine (CATAPRES) 0.3 mg tablet Take 1 Tab by mouth every eight (8) hours. (Patient not taking: Reported on 5/28/2021) 21 Tab 0    labetalol (NORMODYNE) 100 mg tablet Take 1 Tab by mouth every twelve (12) hours. 14 Tab 0    NIFEdipine ER (PROCARDIA XL) 90 mg ER tablet Take 1 Tab by mouth daily. (Patient not taking: Reported on 5/28/2021) 7 Tab 0    spironolactone (ALDACTONE) 25 mg tablet Take 1 Tab by mouth two (2) times a day.  (Patient not taking: Reported on 5/28/2021) 14 Tab 0    HYDROcodone-acetaminophen (NORCO) 5-325 mg per tablet Take 1 Tab by mouth every eight (8) hours as needed for Pain. (Patient not taking: Reported on 5/28/2021) 14 Tab 0    lisinopril (PRINIVIL, ZESTRIL) 20 mg tablet Take 20 mg by mouth daily. (Patient not taking: Reported on 5/28/2021)      chlorthalidone (HYGROTEN) 25 mg tablet Take 25 mg by mouth daily. (Patient not taking: Reported on 5/28/2021)      NIFEdipine ER (PROCARDIA XL) 90 mg ER tablet Take 90 mg by mouth daily. (Patient not taking: Reported on 5/28/2021)         Past History     Past Medical History:  Past Medical History:   Diagnosis Date    Chronic kidney disease     dialysis tue, thur, sat    D[facility] patient ([facility] [address]     GERD (gastroesophageal reflux disease)     Hypertension     Kidney damage        Past Surgical History:  Past Surgical History:   Procedure Laterality Date    HX OTHER SURGICAL      kidney removed       Family History:  Family History   Problem Relation Age of Onset    Hypertension Mother     Hypertension Father        Social History:  Social History     Tobacco Use    Smoking status: Current Every Day Smoker     Packs/day: 0.50     Years: 7.00     Pack years: 3.50    Smokeless tobacco: Never Used   Substance Use Topics    Alcohol use: Yes     Alcohol/week: 11.7 standard drinks     Types: 14 Cans of beer per week     Comment: 2 beers a day    Drug use: No       Allergies:  No Known Allergies      Review of Systems   Review of all other symptoms negative  Review of Systems    Physical Exam   Thin middle-aged AA male no acute distress  Physical Exam  Vitals and nursing note reviewed. Constitutional:       General: He is not in acute distress. Appearance: Normal appearance. He is not ill-appearing or toxic-appearing. HENT:      Head: Normocephalic and atraumatic.       Nose: Nose normal.      Mouth/Throat:      Mouth: Mucous membranes are moist.   Eyes:      Extraocular Movements: Extraocular movements intact. Conjunctiva/sclera: Conjunctivae normal.      Pupils: Pupils are equal, round, and reactive to light. Neck:      Vascular: No carotid bruit. Cardiovascular:      Rate and Rhythm: Normal rate and regular rhythm. Pulses: Normal pulses. Heart sounds: Murmur heard. Comments: Soft systolic murmur  Pulmonary:      Effort: Pulmonary effort is normal. No respiratory distress. Breath sounds: Normal breath sounds. No wheezing, rhonchi or rales. Abdominal:      General: Abdomen is flat. There is no distension. Palpations: Abdomen is soft. There is no mass. Tenderness: There is no abdominal tenderness. There is no right CVA tenderness, left CVA tenderness, guarding or rebound. Hernia: No hernia is present. Musculoskeletal:         General: Normal range of motion. Cervical back: Normal range of motion and neck supple. No rigidity. No muscular tenderness. Skin:     General: Skin is warm and dry. Neurological:      General: No focal deficit present. Mental Status: He is alert and oriented to person, place, and time. Mental status is at baseline. Psychiatric:         Mood and Affect: Mood normal.         Behavior: Behavior normal.         Thought Content: Thought content normal.         Lab and Diagnostic Study Results     Labs -     Recent Results (from the past 12 hour(s))   CBC WITH AUTOMATED DIFF    Collection Time: 07/21/21  2:30 PM   Result Value Ref Range    WBC 6.3 4.4 - 11.3 K/uL    RBC 1.69 M/uL    HGB 5.7 (L) 13.0 - 16.0 g/dL    HCT 16.5 (L) 41 - 53 %    MCV 97.6 80 - 100 FL    MCH 33.7 31 - 34 PG    MCHC 34.6 31.0 - 36.0 g/dL    RDW 14.4 11.5 - 14.5 %    PLATELET 400 K/uL    MPV 7.3 6.5 - 11.5 FL    NEUTROPHILS 75 42 - 75 %    LYMPHOCYTES 20 (L) 20.5 - 51.1 %    MONOCYTES 4 3.1 - 13.9 %    EOSINOPHILS 1 0.9 - 2.9 %    BASOPHILS 0 0.0 - 2.5 %    ABS. NEUTROPHILS 4.8 1.8 - 7.7 K/UL    ABS.  LYMPHOCYTES 1.2 1.0 - 4.8 K/UL    ABS. MONOCYTES 0.2 0.0 - 0.8 K/UL    ABS. EOSINOPHILS 0.1 0.0 - 0.7 K/UL    ABS. BASOPHILS 0.0 0.0 - 0.2 K/UL   METABOLIC PANEL, COMPREHENSIVE    Collection Time: 07/21/21  2:30 PM   Result Value Ref Range    Sodium 141 136 - 145 mmol/L    Potassium 3.7 3.5 - 5.1 mmol/L    Chloride 102 97 - 108 mmol/L    CO2 31 21 - 32 mmol/L    Anion gap 8 5 - 15 mmol/L    Glucose 93 65 - 100 mg/dL    BUN 23 (H) 6 - 20 mg/dL    Creatinine 8.57 (H) 0.70 - 1.30 mg/dL    BUN/Creatinine ratio 3 (L) 12 - 20      GFR est AA 8 (L) >60 ml/min/1.73m2    GFR est non-AA 7 (L) >60 ml/min/1.73m2    Calcium 8.7 8.5 - 10.1 mg/dL    Bilirubin, total 0.4 0.2 - 1.0 mg/dL    AST (SGOT) 11 (L) 15 - 37 U/L    ALT (SGPT) 6 (L) 12 - 78 U/L    Alk. phosphatase 57 45 - 117 U/L    Protein, total 6.3 (L) 6.4 - 8.2 g/dL    Albumin 2.9 (L) 3.5 - 5.0 g/dL    Globulin 3.4 2.0 - 4.0 g/dL    A-G Ratio 0.9 (L) 1.1 - 2.2     TROPONIN I    Collection Time: 07/21/21  2:30 PM   Result Value Ref Range    Troponin-I, Qt. <0.05 <0.05 ng/mL   PROTHROMBIN TIME + INR    Collection Time: 07/21/21  2:30 PM   Result Value Ref Range    Prothrombin time 11.0 9.0 - 11.1 sec    INR 1.1 0.9 - 1.1     EKG, 12 LEAD, INITIAL    Collection Time: 07/21/21  2:35 PM   Result Value Ref Range    Ventricular Rate 91 BPM    Atrial Rate 91 BPM    P-R Interval 161 ms    QRS Duration 88 ms    Q-T Interval 380 ms    QTC Calculation (Bezet) 468 ms    Calculated P Axis 34 degrees    Calculated R Axis -19 degrees    Calculated T Axis 56 degrees    Diagnosis       Sinus rhythm  Left atrial enlargement  Left ventricular hypertrophy         Radiologic Studies -   @[email or web address]@  CT Results  (Last 48 hours)    None        CXR Results  (Last 48 hours)               07/21/21 1435  XR CHEST PORT Final result    Impression:  No plain film evidence for CHF or pneumonia. Narrative:  Chest single view. Comparison single view chest July 15, 2021.        Lungs clear: No interstitial or alveolar pulmonary edema. Cardiac and   mediastinal structures unchanged. No pneumothorax or sizable pleural effusion. Medical Decision Making   - I am the first provider for this patient. - I reviewed the vital signs, available nursing notes, past medical history, past surgical history, family history and social history. - Initial assessment performed. The patients presenting problems have been discussed, and they are in agreement with the care plan formulated and outlined with them. I have encouraged them to ask questions as they arise throughout their visit. Vital Signs-Reviewed the patient's vital signs. Patient Vitals for the past 12 hrs:   Temp Pulse Resp BP SpO2   07/21/21 1530 -- 87 20 118/80 99 %   07/21/21 1421 100.3 °F (37.9 °C) 93 18 130/89 100 %       Records Reviewed: Nursing Notes and Old Medical Records    The patient presents with severe anemia with a differential diagnosis of anemia due to chronic renal failure chronic disease GI blood loss      ED Course:          Provider Notes (Medical Decision Making):   43-year-old male end-stage renal disease on hemodialysis with severe anemia hemoglobin 5.7 discussed with hospitalist Cheryl Mary will admit for transfusion. Labs otherwise reflect renal failure. EKG performed at 1435 interpreted at 1447 shows a sinus rhythm at 91 with left atrial enlargement LVH by voltage no acute ST-T changes NV interval normal QRS duration mildly prolonged left axis   MDM       Procedures   Medical Decision Makingedical Decision Making  Performed by: Harriet Angeles MD  PROCEDURES:  Procedures       Disposition   Disposition: Admitted to Floor Medical Floor the case was discussed with the admitting physician Cynthia Mack    Admitted    DISCHARGE PLAN:  1. Current Discharge Medication List      CONTINUE these medications which have NOT CHANGED    Details   pantoprazole (PROTONIX) 20 mg tablet Take 20 mg by mouth daily.       ondansetron (Zofran ODT) 4 mg disintegrating tablet 1 Tablet by SubLINGual route every eight (8) hours as needed for Nausea or Vomiting. Qty: 20 Tablet, Refills: 0      cloNIDine (CATAPRES) 0.3 mg tablet Take 1 Tab by mouth every eight (8) hours. Qty: 21 Tab, Refills: 0    Comments: Per Dr. Fortunato Londono (Nephrology)      labetalol (NORMODYNE) 100 mg tablet Take 1 Tab by mouth every twelve (12) hours. Qty: 14 Tab, Refills: 0    Comments: Per Dr. Fortunato Londono (Nephrology)      !! NIFEdipine ER (PROCARDIA XL) 90 mg ER tablet Take 1 Tab by mouth daily. Qty: 7 Tab, Refills: 0    Comments: Per Dr. Fortunato Londono (Nephrology)      spironolactone (ALDACTONE) 25 mg tablet Take 1 Tab by mouth two (2) times a day. Qty: 14 Tab, Refills: 0    Comments: Per Dr. Fortunato Londono (Nephrology)      HYDROcodone-acetaminophen [facility]) 5-325 mg per tablet Take 1 Tab by mouth every eight (8) hours as needed for Pain. Qty: 14 Tab, Refills: 0      lisinopril (PRINIVIL, ZESTRIL) 20 mg tablet Take 20 mg by mouth daily. chlorthalidone (HYGROTEN) 25 mg tablet Take 25 mg by mouth daily. !! NIFEdipine ER (PROCARDIA XL) 90 mg ER tablet Take 90 mg by mouth daily. !! - Potential duplicate medications found. Please discuss with provider. 2.   Follow-up Information    None       3. Return to ED if worse   4. Current Discharge Medication List            Diagnosis     Clinical Impression: Severe anemia requiring transfusion  Attestations:    Jericho Cortez MD    Please note that this dictation was completed with Lik[employer], the [employer]track voice recognition software. Quite often unanticipated grammatical, syntax, homophones, and other interpretive errors are inadvertently transcribed by the computer software. Please disregard these errors. Please excuse any errors that have escaped final proofreading. Thank you.

## 2021-07-22 NOTE — PROGRESS NOTES
Care Management Interventions  PCP Verified by CM:  (Patient does not have a PCP. Will refer and set up follow up appointment.)  Mode of Transport at Discharge: Other (see comment) (Girlfriend)  Transition of Care Consult (CM Consult): Discharge Planning  Current Support Network: Own Home (Lives with significant other, )  Confirm Follow Up Transport: Family  The Plan for Transition of Care is Related to the Following Treatment Goals : Plan to discharge home to self care. No discharge planning needs identified at this time.    Discharge Location  Discharge Placement: Home

## 2021-07-22 NOTE — ACP (ADVANCE CARE PLANNING)
Advance Care Planning   Healthcare Decision Maker:       Primary Decision Maker: Ramo Alexander -  - [phone]    Click here to complete [address] including selection of the Healthcare Decision Maker Relationship (ie \"Primary\")

## 2021-07-22 NOTE — PROGRESS NOTES
Spiritual Care Assessment/Progress Note  [facility]      NAME: Edilberto Vail      MRN: [id number]  AGE: 41 y.o.  SEX: male  Anglican Affiliation: Mandaen   Language: English     7/22/2021     Total Time (in minutes): 25     Spiritual Assessment begun in [facility] [address] through conversation with:         [x]Patient        [] Family    [] Friend(s)        Reason for Consult: Initial/Spiritual assessment, patient floor     Spiritual beliefs: (Please include comment if needed)     [x] Identifies with a william tradition:         [] Supported by a william community:            [] Claims no spiritual orientation:           [] Seeking spiritual identity:                [] Adheres to an individual form of spirituality:           [] Not able to assess:                           Identified resources for coping:      [x] Prayer                               [] Music                  [] Guided Imagery     [x] Family/friends                 [] Pet visits     [] Devotional reading                         [] Unknown     [] Other:                                            Interventions offered during this visit: (See comments for more details)    Patient Interventions: Affirmation of william, Catharsis/review of pertinent events in supportive environment, Coping skills reviewed/reinforced, Affirmation of emotions/emotional suffering, Initial/Spiritual assessment, patient floor, Iconic (affirming the presence of God/Higher Power), Prayer (actual)           Plan of Care:     [] Support spiritual and/or cultural needs    [] Support AMD and/or advance care planning process      [] Support grieving process   [] Coordinate Rites and/or Rituals    [] Coordination with community clergy   [] No spiritual needs identified at this time   [] Detailed Plan of Care below (See Comments)  [] Make referral to Music Therapy  [] Make referral to Pet Therapy     [] Make referral to Addiction services  [] Make referral to [facility]  [] Make referral to Spiritual Care Partner  [] No future visits requested        [x] Follow up upon further referrals     Comments: The purpose of the visit was to do a spiritual assessment on the patient. The patient was resting in bed watching television, however he welcomed the visit. He shared that he has a family support system that loves and support him. He shared that is a spiritual person and that he values prayer. He asked if the  would pray for him. The  provided prayer at the patient's request and the ministry of spiritual support. [facility] Jada Jackson.    can be reached by calling the  at [facility]  [phone]

## 2021-07-22 NOTE — DISCHARGE SUMMARY
Physician Discharge Summary     Patient ID:    Corrina Biggs  [id number]  63 y.o.  1980    Admit date: 7/21/2021    Discharge date : 7/22/2021. Left against medical advice    Chronic Diagnoses:    Problem List as of 7/22/2021 Date Reviewed: 2/5/2021        Codes Class Noted - Resolved    Abdominal pain, epigastric ICD-10-CM: R10.13  ICD-9-CM: 789.06  7/3/2021 - Present        ESRD (end stage renal disease) on dialysis [facility]) ICD-10-CM: N18.6, Z99.2  ICD-9-CM: 585.6, V45.11  2/5/2021 - Present        Anemia ICD-10-CM: D64.9  ICD-9-CM: 285.9  2/5/2021 - Present        Symptomatic anemia ICD-10-CM: D64.9  ICD-9-CM: 285.9  2/5/2021 - Present        Abnormal EKG ICD-10-CM: R94.31  ICD-9-CM: 794.31  3/3/2014 - Present        Hypertension ICD-10-CM: I10  ICD-9-CM: 401.9  Unknown - Present        TUNDE (acute kidney injury) ([facility] [address] ICD-10-CM: N17.9  ICD-9-CM: 584.9  2/28/2014 - Present        Hyponatremia ICD-10-CM: E87.1  ICD-9-CM: 276.1  2/28/2014 - Present        Tenosynovitis ICD-10-CM: M65.9  ICD-9-CM: 727.00  2/28/2014 - Present          22    Final Diagnoses:   Anemia [D64.9]  end-stage renal disease on hemodialysis Tuesday Thursday Saturdays  anemia of chronic disease    Reason for Hospitalization:    Abnormally low hemoglobin    Hospital Course:     64-year-old gentleman with history of end-stage renal disease on hemodialysis admitted yesterday for acute on chronic anemia. He had outpatient labs which indicated hemoglobin of 5.2. Received 2 units of packed red blood cells overnight with improvement in hemoglobin to 7.3. He was scheduled to receive additional 1 unit of packed red blood cell during hemodialysis today. Decided to sign his himself AGAINST MEDICAL ADVICE. He was encouraged to wait a few hours and get blood transfusion during hemodialysis but decided to leave. Advised against risk of getting into fluid overload due to blood transfusion overnight.   He adamantly refuses to stay.  He is scheduled for EGD tomorrow at [address] [place] [address] with Dr. Madelaine Sethi            Discharge Medications:   Current Discharge Medication List            Follow up Care:    1. None in 1-2 weeks. Please call to set up an appointment shortly after discharge. Diet:  Renal Diet    Disposition:  Home. Advanced Directive:   FULL    DNR      Discharge Exam:  Visit Vitals  /74   Pulse 77   Temp 98 °F (36.7 °C)   Resp 16   Ht 5' 6\" (1.676 m)   Wt 56.5 kg (124 lb 8 oz)   SpO2 97%   BMI 20.09 kg/m²      O2 Device: None (Room air)    Temp (24hrs), Av.5 °F (36.9 °C), Min:98 °F (36.7 °C), Max:100.3 °F (37.9 °C)    No intake/output data recorded.  1901 -  0700  In: 618.8   Out: -     General:  Alert, cooperative, no distress, appears stated age. Lungs:   Clear to auscultation bilaterally. Chest wall:  No tenderness or deformity. Heart:  Regular rate and rhythm, S1, S2 normal, no murmur, click, rub or gallop. Abdomen:   Soft, non-tender. Bowel sounds normal. No masses,  No organomegaly. Extremities: Extremities normal, atraumatic, no cyanosis or edema. Pulses: 2+ and symmetric all extremities. Skin: Skin color, texture, turgor normal. No rashes or lesions   Neurologic: CNII-XII intact. No gross sensory or motor deficits         CONSULTATIONS: Nephrology    Significant Diagnostic Studies:   2021: BUN 23 mg/dL* (Ref range: 6 - 20 mg/dL); Calcium 8.7 mg/dL (Ref range: 8.5 - 10.1 mg/dL); CO2 31 mmol/L (Ref range: 21 - 32 mmol/L); Creatinine 8.57 mg/dL* (Ref range: 0.70 - 1.30 mg/dL); Glucose 93 mg/dL (Ref range: 65 - 100 mg/dL); HCT 16.5 %* (Ref range: 41 - 53 %); HGB 5.7 g/dL* (Ref range: 13.0 - 16.0 g/dL); Potassium 3.7 mmol/L (Ref range: 3.5 - 5.1 mmol/L); Sodium 141 mmol/L (Ref range: 136 - 145 mmol/L)  2021: HCT 20.7 %* (Ref range: 41 - 53 %);  HGB 7.3 g/dL* (Ref range: 13.5 - 17.5 g/dL)  Recent Labs     21  0516 21  1430   WBC 5.8 6.3   HGB 7.3* 5.7*   HCT 20.7* 16.5*  267     Recent Labs     07/21/21  1430      K 3.7      CO2 31   BUN 23*   CREA 8.57*   GLU 93   CA 8.7     Recent Labs     07/21/21  1430   ALT 6*   AP 57   TBILI 0.4   TP 6.3*   ALB 2.9*   GLOB 3.4     Recent Labs     07/21/21  1430   INR 1.1   PTP 11.0      No results for input(s): FE, TIBC, PSAT, FERR in the last 72 hours. No results for input(s): PH, PCO2, PO2 in the last 72 hours. No results for input(s): CPK, CKMB in the last 72 hours.     No lab exists for component: TROPONINI  Lab Results   Component Value Date/Time    Glucose (POC) 91 02/28/2014 05:43 PM       Total Time: 30 minutes    Signed:  Nino Chang MD  7/22/2021  11:20 AM

## 2021-07-22 NOTE — ROUTINE PROCESS
Lt AV fistula. No resp distress. Resting with eyes closed. No c/o. MA intact. No pain. No shortness of breath.

## 2021-07-22 NOTE — ROUTINE PROCESS
Pt wants to sign himself out. Dr. Malik Khan notified and in to discuss plan of care, pt refusing and supervisor in to speak with pt. Pt does not want to stay for dialysis or transfusion. See AMA form. Pt had called his family to pick him up.

## 2021-07-23 NOTE — ANESTHESIA POSTPROCEDURE EVALUATION
Procedure(s):  ESOPHAGOGASTRODUODENOSCOPY (EGD). total IV anesthesia, general    Anesthesia Post Evaluation      Multimodal analgesia: multimodal analgesia not used between 6 hours prior to anesthesia start to PACU discharge  Patient location during evaluation: PACU  Patient participation: complete - patient participated  Level of consciousness: awake and alert  Pain score: 0  Pain management: adequate  Airway patency: patent  Anesthetic complications: no  Cardiovascular status: acceptable, hemodynamically stable and blood pressure returned to baseline  Respiratory status: acceptable, nonlabored ventilation, spontaneous ventilation, unassisted and room air  Hydration status: acceptable  Post anesthesia nausea and vomiting:  none  Final Post Anesthesia Temperature Assessment:  Normothermia (36.0-37.5 degrees C)      INITIAL Post-op Vital signs: No vitals data found for the desired time range.

## 2021-07-23 NOTE — ROUTINE PROCESS
Patient alert and oriented x 4 with even and unlabored respirations. Patient offered liquids and snack. Vital signs stable. Patient education completed and patient discharged to home via wheelchair with friend in private vehicle.

## 2021-07-23 NOTE — ANESTHESIA PREPROCEDURE EVALUATION
Relevant Problems   CARDIOVASCULAR   (+) Hypertension      RENAL FAILURE   (+) TUNDE (acute kidney injury) ([facility]   (+) ESRD (end stage renal disease) on dialysis (HCC)      HEMATOLOGY   (+) Anemia   (+) Symptomatic anemia       Anesthetic History   No history of anesthetic complications            Review of Systems / Medical History  Patient summary reviewed, nursing notes reviewed and pertinent labs reviewed    Pulmonary  Within defined limits                 Neuro/Psych   Within defined limits           Cardiovascular    Hypertension (Malignant Hypertension)                Comments: 7/22/2021 EKG    Diagnosis Sinus rhythm   Left atrial enlargement   Left ventricular hypertrophy        GI/Hepatic/Renal     GERD    Renal disease: ESRD and dialysis       Endo/Other             Other Findings   Comments: COVID-19 Vaccine:   Unknown  Allergies  No Known Allergies  Ht: 5' 7.5\" (171.5 cm)  Weight: 57 kg (125 lb 9.6 oz)  BMI: 20.09 kg/m²  No watch meds found  CrCl:   9.1 mL/min (A)  Procedure  ESOPHAGOGASTRODUODENOSCOPY (EGD) (N/A )  Pre-Proc, [facility] ENDO 01      Medical History  Hypertension  Kidney damage  Dialysis patient ([facility]  GERD (gastroesophageal reflux disease)  Chronic kidney disease         Physical Exam    Airway  Mallampati: II           Cardiovascular               Dental         Pulmonary                 Abdominal         Other Findings   Comments: Results for Mel Toth (MRN [id number]) as of 7/23/2021 09:56    7/22/2021 05:16  WBC: 5.8  NRBC: 0.0  RBC: 2.29 (L)  HGB: 7.3 (L)  HCT: 20.7 (L)  MCV: 90.3  MCH: 32.0  MCHC: 35.4  RDW: 20.9 (H)  PLATELET: 412  MPV: 7.7  ABSOLUTE NRBC: 0.00    Results for Mel Toth (MRN [id number]) as of 7/23/2021 09:56    7/21/2021 14:30  Sodium: 141  Potassium: 3.7  Chloride: 102  CO2: 31  Anion gap: 8  Glucose: 93  BUN: 23 (H)  Creatinine: 8.57 (H)  BUN/Creatinine ratio: 3 (L)  Calcium: 8.7  GFR est non-AA: 7 (L)  GFR est AA: 8 (L)  Bilirubin, total: 0.4  Protein, total: 6.3 (L)  Albumin: 2.9 (L)  Globulin: 3.4  A-G Ratio: 0.9 (L)  ALT: 6 (L)  AST: 11 (L)  Alk. phosphatase: 57  Troponin-I, Qt.: <0.05         Anesthetic Plan    ASA: 4  Anesthesia type: total IV anesthesia and general          Induction: Intravenous  Anesthetic plan and risks discussed with: Patient and Family      General anesthesia was prescribed for this patient because by definition it is \"a drug-induced loss of consciousness during which patients are not arousable, even by painful stimulation. \" Sometimes, the ability to independently maintain ventilatory function is often impaired and patients often require assistance in maintaining a patent airway. Occasionally, positive pressure ventilation may be required because of depressed spontaneous ventilation or drug-induced depression of neuromuscular function. This depth of anesthesia is preferred for endoscopic/esophageal procedures to facilitate the procedure and for patient safety/quality of care.

## 2021-07-29 PROBLEM — H35.039 HYPERTENSIVE RETINOPATHY: Status: ACTIVE | Noted: 2021-01-01

## 2021-07-29 PROBLEM — H40.50X0 NEOVASCULAR GLAUCOMA: Status: ACTIVE | Noted: 2021-01-01

## 2021-07-29 NOTE — PROGRESS NOTES
Roberto Zamora is a 41 y.o. male who presents to the office today for the following:    Chief Complaint   Patient presents with   [address] Patient   [facility] Follow Up       Past Medical History:   Diagnosis Date    Chronic kidney disease     dialysis tue, thur, sat    Dialysis patient ([address]     GERD (gastroesophageal reflux disease)     Hypertension     Kidney damage        Past Surgical History:   Procedure Laterality Date    HX OTHER SURGICAL      kidney removed        Family History   Problem Relation Age of Onset    Hypertension Mother     Hypertension Father         Social History     Tobacco Use    Smoking status: Current Every Day Smoker     Packs/day: 0.33     Years: 10.00     Pack years: 3.30    Smokeless tobacco: Never Used   Substance Use Topics    Alcohol use: Yes    Drug use: No        HPI  Patient here today as new patient with PMH of hypertension, GERD and ESRD. States that he is tue, thurs, sat dialysis and follows with Dr. Smooth Elliott. Recently hospitalized due to missing dialysis, epigastric pain and anemia. Has been having epigastric pain off and on for over 3 months and states this was causing him to miss dialysis. During inpatient stay did receive blood and also had EGD. States that he is still having pain in epigastric region but is doing somewhat better. Has a follow up appointment next week with Dr. Naheed Larson about EGD. Did attend dialysis today and has taken medications as directed. Current Outpatient Medications on File Prior to Visit   Medication Sig    sevelamer (RENAGEL) 800 mg tablet Take 800 mg by mouth three (3) times daily (with meals).  doxazosin (CARDURA) 4 mg tablet Take 4 mg by mouth nightly.  metoprolol tartrate (LOPRESSOR) 50 mg tablet Take  by mouth two (2) times a day.  hydrALAZINE (APRESOLINE) 100 mg tablet Take 100 mg by mouth three (3) times daily.  verapamiL (CALAN) 120 mg tablet Take 120 mg by mouth two (2) times a day.     irbesartan (AVAPRO) 300 mg tablet Take 300 mg by mouth daily.  gabapentin (NEURONTIN) 100 mg capsule Take 200 mg by mouth nightly.  pantoprazole (PROTONIX) 20 mg tablet Take 40 mg by mouth daily.  [DISCONTINUED] carvediloL (COREG) 25 mg tablet TAKE 1 TABLET BY MOUTH TWICE A DAY FOR BLOOD PRESSURE    [DISCONTINUED] labetalol (NORMODYNE) 100 mg tablet Take 1 Tab by mouth every twelve (12) hours. No current facility-administered medications on file prior to visit. No orders of the defined types were placed in this encounter. Review of Systems   Constitutional: Negative. Eyes: Negative. Respiratory: Negative for cough, hemoptysis, sputum production and shortness of breath. Cardiovascular: Positive for leg swelling. Negative for palpitations, orthopnea and claudication. Gastrointestinal: Positive for abdominal pain, heartburn and nausea. Negative for blood in stool, constipation, diarrhea and vomiting. Musculoskeletal: Positive for myalgias. Neurological: Negative for dizziness, focal weakness, loss of consciousness, weakness and headaches. Psychiatric/Behavioral: Negative. Visit Vitals  /89   Pulse 78   Temp 98.3 °F (36.8 °C) (Oral)   Resp 18   Wt 125 lb (56.7 kg)   SpO2 98%   BMI 20.18 kg/m²       Physical Exam  Vitals and nursing note reviewed. Constitutional:       Appearance: Normal appearance. Cardiovascular:      Rate and Rhythm: Normal rate. Pulses: Normal pulses. Pulmonary:      Effort: Pulmonary effort is normal.      Breath sounds: Normal breath sounds. Abdominal:      General: Bowel sounds are normal.      Palpations: Abdomen is soft. Tenderness: There is abdominal tenderness (epigastric). Musculoskeletal:         General: Normal range of motion. Right lower leg: Edema (trace) present. Left lower leg: Edema (trace) present. Skin:     General: Skin is warm and dry.       Comments: Left arm + bruit/thrill   Neurological:      Mental Status: He is alert and oriented to person, place, and time. Mental status is at baseline. Psychiatric:         Mood and Affect: Mood normal.         Behavior: Behavior normal.              1. Hospital discharge follow-up    - DC DISCHARGE MEDS RECONCILED W/ CURRENT OUTPATIENT MED LIST    2. Essential hypertension  Blood pressure controlled and continue medications as directed    3. ESRD (end stage renal disease) on dialysis [facility])  Reviewed recent note per Dr. Lynn Morales and had follow up on 7/28/21   He was encouraged to avoid missed treatments and possibly referral back to transplant team if compliant    4. Symptomatic anemia  Received 2 units PRBC's inpatient  Lab Results   Component Value Date/Time    WBC 5.8 07/22/2021 05:16 AM    Hemoglobin (POC) 16.0 02/28/2014 05:43 PM    HGB 7.3 (L) 07/22/2021 05:16 AM    Hematocrit (POC) 47 02/28/2014 05:43 PM    HCT 20.7 (L) 07/22/2021 05:16 AM    PLATELET 705 31/84/0709 05:16 AM    MCV 90.3 07/22/2021 05:16 AM   He has more recent results done by dialysis and will request  Chronic due ESRD and recent gastritis  Being managed by nephrology    5. Tobacco dependence  Smoking cessation encouraged    6. Gastroesophageal reflux disease without esophagitis  On protonix as directed    7. Epigastric abdominal pain  Persistent epigastric pain and tenderness on exam  Recent EGD showing chronic gastritis and esophageal tumor with Dr. Madison Mancera   Continued on on protonix 40mg daily   [place] diet recommended   Reports no NSAID or Etoh use    8. Tumor of esophagus  Biopsy done with EGD 7/23/21 and awaiting results  Has follow up appointment with Dr. Madison Mancera in 1 week    Patient verbalizes understanding of plan of care as discussed above    Follow-up and Dispositions    · Return in about 4 weeks (around 8/26/2021).

## 2021-08-25 NOTE — ACP (ADVANCE CARE PLANNING)
Advance Care Planning     General Advance Care Planning (ACP) Conversation      Date of Conversation: 8/25/2021  Conducted with: Patient with Decision Making Capacity    Healthcare Decision Maker:   No healthcare decision makers have been documented. Click here to complete [employer] including selection of the Healthcare Decision Maker Relationship (ie \"Primary\")    Today we documented desired Decision Maker(s), who is (are) NOT Legal Next of Kin. ACP documents are required for decision maker authority.     Content/Action Overview:   Has NO ACP documents/care preferences - information provided, considering goals and options  Reviewed DNR/DNI and patient elects Full Code (Attempt Resuscitation)  Topics discussed: treatment goals, benefit/burden of treatment options, artificial nutrition, ventilation preferences, hospitalization preferences, resuscitation preferences, end of life care preferences (vegetative state/imminent death) and hospice care       Length of Voluntary ACP Conversation in minutes:  <16 minutes (Non-Billable)    Shaun Olmos, NP

## 2021-08-25 NOTE — PROGRESS NOTES
Kamran Witt is a 35 y.o. male who presents to the office today for the following:    Chief Complaint   Patient presents with   [facility] Annual Wellness Visit    Abdominal Pain       Past Medical History:   Diagnosis Date    Cancer [facility])     Chronic kidney disease     dialysis tue thur, sat    Dialysis patient ([address]     GERD (gastroesophageal reflux disease)     Hypertension     Kidney damage        Past Surgical History:   Procedure Laterality Date    HX OTHER SURGICAL      kidney removed        Family History   Problem Relation Age of Onset    Hypertension Mother     Hypertension Father         Social History     Tobacco Use    Smoking status: Current Every Day Smoker     Packs/day: 0.33     Years: 10.00     Pack years: 3.30    Smokeless tobacco: Never Used   Substance Use Topics    Alcohol use: Yes    Drug use: No        HPI  Patient here today  with PMH of hypertension, GERD and ESRD. He is paxton chatterjee, sat dialysis and follows with Dr. Eloisa Hernandez. Reports that since last visit he has been compliant with dialysis schedule. Is still experiencing significant epigastric pain and difficult to eat. Has appointment to go back to see Dr. Karen Pena on 8/27/21 but is not sure of plan. He did miss his last appointment in 7/2021 due to transportation difficulty. Current Outpatient Medications on File Prior to Visit   Medication Sig    sucralfate (Carafate) 100 mg/mL suspension Take  by mouth.  sevelamer (RENAGEL) 800 mg tablet Take 800 mg by mouth three (3) times daily (with meals).  doxazosin (CARDURA) 4 mg tablet Take 4 mg by mouth nightly.  metoprolol tartrate (LOPRESSOR) 50 mg tablet Take  by mouth two (2) times a day.  hydrALAZINE (APRESOLINE) 100 mg tablet Take 100 mg by mouth three (3) times daily.  verapamiL (CALAN) 120 mg tablet Take 120 mg by mouth two (2) times a day.  irbesartan (AVAPRO) 300 mg tablet Take 300 mg by mouth daily.     gabapentin (NEURONTIN) 100 mg capsule Take 200 mg by mouth nightly.  pantoprazole (PROTONIX) 20 mg tablet Take 40 mg by mouth daily. No current facility-administered medications on file prior to visit. No orders of the defined types were placed in this encounter. Review of Systems   Constitutional: Negative. Eyes: Negative. Respiratory: Negative for cough, hemoptysis, sputum production and shortness of breath. Cardiovascular: Positive for leg swelling. Negative for palpitations, orthopnea and claudication. Gastrointestinal: Positive for abdominal pain, heartburn and nausea. Negative for blood in stool, constipation, diarrhea and vomiting. Musculoskeletal: Positive for myalgias. Neurological: Negative for dizziness, focal weakness, loss of consciousness, weakness and headaches. Psychiatric/Behavioral: Negative. Visit Vitals  /87 (BP 1 Location: Left upper arm, BP Patient Position: Sitting, BP Cuff Size: Adult)   Pulse 73   Temp 97.5 °F (36.4 °C) (Temporal)   Resp 18   Wt 122 lb (55.3 kg)   SpO2 98%   BMI 20.30 kg/m²       Physical Exam  Vitals and nursing note reviewed. Constitutional:       Appearance: Normal appearance. Cardiovascular:      Rate and Rhythm: Normal rate. Pulses: Normal pulses. Pulmonary:      Effort: Pulmonary effort is normal.      Breath sounds: Normal breath sounds. Abdominal:      General: Bowel sounds are normal.      Palpations: Abdomen is soft. Tenderness: There is abdominal tenderness (epigastric). Musculoskeletal:         General: Normal range of motion. Right lower leg: Edema (trace) present. Left lower leg: Edema (trace) present. Skin:     General: Skin is warm and dry. Comments: Left arm + bruit/thrill   Neurological:      Mental Status: He is alert and oriented to person, place, and time. Mental status is at baseline.    Psychiatric:         Mood and Affect: Mood normal.         Behavior: Behavior normal.                1. Essential hypertension  Blood pressure controlled and continue medications as directed    2. ESRD (end stage renal disease) on dialysis [facility])  Reviewed recent note per Dr. Urmila Martinez and had follow up on 7/28/21   He is reporting compliance with treatments    3. Anemia  Received 2 units PRBC's inpatient 7/2021  Lab Results   Component Value Date/Time    WBC 5.8 07/22/2021 05:16 AM    Hemoglobin (POC) 16.0 02/28/2014 05:43 PM    HGB 7.3 (L) 07/22/2021 05:16 AM    Hematocrit (POC) 47 02/28/2014 05:43 PM    HCT 20.7 (L) 07/22/2021 05:16 AM    PLATELET 806 91/82/7435 05:16 AM    MCV 90.3 07/22/2021 05:16 AM   He has more recent results done by dialysis and will request copy  Chronic due ESRD and recent gastritis  Being managed by nephrology    4. Tobacco dependence  Smoking cessation encouraged    5. Gastroesophageal reflux disease without esophagitis  On protonix as directed    6. Epigastric abdominal pain  Persistent epigastric pain and tenderness on exam  Recent EGD showing chronic gastritis and esophageal tumor with Dr. Lesele Lechuga   Continued on on protonix 40mg daily   [place] diet   Reports no NSAID or Etoh use  He is losing weight rapidly and have advised if unable to keep down food/fluids then needs to go to ED immeidately     7. 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 Leslee Lechuga on 8/27/21 but he is not sure of plan    Patient verbalizes understanding of plan of care as discussed above      Addendum:  Spoke with patient after appointment once contacting Dr. Leslee Lechuga office. He does have appointment for EUS on 8/27/21 per Judy Olson NP due to esophagus tumor which appears malignant. Was referred to surgery and oncology by GI provider and they are going to discuss at his appointment in 2 days. Did make aware of weight loss and pain which they will address at his appointment.  He does have transportation lined up for appointment and will update after he sees them. Follow-up and Dispositions    · Return in about 4 weeks (around 9/22/2021) for or sooner for worsening symptoms. This is the Subsequent Medicare Annual Wellness Exam, performed 12 months or more after the Initial AWV or the last Subsequent AWV    I have reviewed the patient's medical history in detail and updated the computerized patient record. Assessment/Plan   Education and counseling provided:  Are appropriate based on today's review and evaluation  End-of-Life planning (with patient's consent)  Pneumococcal Vaccine  Influenza Vaccine  Colorectal cancer screening tests  Cardiovascular screening blood test  Diabetes screening test         Depression Risk Factor Screening     3 most recent PHQ Screens 8/25/2021   Little interest or pleasure in doing things Not at all   Feeling down, depressed, irritable, or hopeless Not at all   Total Score PHQ 2 0       Alcohol Risk Screen    Do you average more than 2 drinks per night or 14 drinks a week: No    On any one occasion in the past three months have you have had more than 4 drinks containing alcohol:  No        Functional Ability and Level of Safety    Hearing: Hearing is good. Activities of Daily Living: The home contains: no safety equipment. Patient needs help with:  transportation      Ambulation: with no difficulty     Fall Risk:  Fall Risk Assessment, last 12 mths 8/25/2021   Able to walk? Yes   Fall in past 12 months? 0   Do you feel unsteady?  0   Are you worried about falling 0      Abuse Screen:  Patient is not abused       Cognitive Screening    Has your family/caregiver stated any concerns about your memory: no     Cognitive Screening: Normal - Clock Drawing Test, Mini Cog Test    Health Maintenance Due     Health Maintenance Due   Topic Date Due    Hepatitis C Screening  Never done    Pneumococcal 0-64 years (1 of 4 - PCV13) Never done    DTaP/Tdap/Td series (1 - Tdap) Never done    Lipid Screen  Never done    Medicare Yearly Exam  Never done       Patient Care Team   Patient Care Team:  None as PCP - General    History     Patient Active Problem List   Diagnosis Code    Hypertension I10    TUNDE (acute [facility]) ([facility] N17.9    Abnormal EKG R94.31    ESRD (end stage renal disease) [facility] dialysis (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  sevelamer (RENAGEL) 800 mg tablet Take 800 mg by mouth three (3) times daily (with meals).  doxazosin (CARDURA) 4 mg tablet Take 4 mg by mouth nightly.  metoprolol tartrate (LOPRESSOR) 50 mg tablet Take  by mouth two (2) times a day.  hydrALAZINE (APRESOLINE) 100 mg tablet Take 100 mg by mouth three (3) times daily.  verapamiL (CALAN) 120 mg tablet Take 120 mg by mouth two (2) times a day.  irbesartan (AVAPRO) 300 mg tablet Take 300 mg by mouth daily.  gabapentin (NEURONTIN) 100 mg capsule Take 200 mg by mouth nightly.  pantoprazole (PROTONIX) 20 mg tablet Take 40 mg by mouth daily. No Known Allergies    Family History   Problem Relation Age of Onset    Hypertension Mother     Hypertension Father      Social History     Tobacco Use    Smoking status: Current Every Day Smoker     Packs/day: 0.33     Years: 10.00     Pack years: 3.30    Smokeless tobacco: Never Used   Substance Use Topics    Alcohol use:  Yes         Brayan Humphrey NP

## 2021-08-27 NOTE — ACP (ADVANCE CARE PLANNING)
Advance Care Planning   Ambulatory ACP Specialist Patient Outreach    Date:  8/27/2021    ACP Specialist:  Leia Haro LPN    Outreach call to patient in follow-up to ACP Specialist referral from:    [x] PCP  [] Provider   [] Ambulatory Care Management [] Other     For:                  [x] Advance Directive Assistance              [] Complete Portable DNR order              [] Complete POST/MOST              [] Code Status Discussion             [] Discuss Goals of Care             [] Early ACP Decision-Making              [] Other (Specify)    Date Referral Received: 8/25/21    Today's Outreach:  [x] First   [] Second  [] Third       Third outreach made by: [] Phone  [] Email / mail    [] [facility]     Intervention:  [x] Spoke with Patient   [] Left VM requesting return call      Outcome: Spoke with pt who was not interested in having a conversation or completing an AMD at this time. Will close referral.        Next Step:   [] ACP scheduled conversation  [] Outreach again in one week               [] Email / Mail ACP Info Sheets  [] Email / Mail Advance Directive   [x] Closing referral.  Routing closure to referring provider/staff and to ACP Specialist . [] Closure letter mailed to patient with invitation to contact ACP Specialist if / when ready.   Thank you for this referral.

## 2021-10-06 NOTE — PERIOP NOTES
Patient called and stated that he has Dialysis at 0830 on 10/14/21 when he is supposed to be swabbed . Writer called PAT to see if patient could come later in the day. Vikki GUARDADO stated that if the patient could come by 1500 on 10/14/21 they could swab him. Made patient aware.

## 2021-10-18 NOTE — PERIOP NOTES
Patient brought into procedure room for EUS procedure. Sedation nurse took patients vital signs. BP noted to be 199/120. When patient was asked about home blood pressure medications he stated he had taken all of his medications at home before coming in. BP reassessed in 5 minutes and was still high at 197/118. Physician came in room nurse made him aware. BP checked again and came back at 214/128. Procedure on hold for now, procedure RN transported patient gave bedside report to [facility] Dr. Vincenzo Pinto made aware of situation and she agreed to see patient.

## 2021-10-18 NOTE — PERIOP NOTES
Patient alert and oriented x 4 with respirations even and unlabored on RA. Patient discharged home per physician's order. Patient verbalizes he does not understand follow-up instructions. Will call physician's office to clarify patient's follow-up instructions. Patient transported via wheelchair to front hospital entrance with patient's friend present to transport patient via private vehicle. Spoke with Mackenzie Gill NP at Dr. Chase Doran office and notified her of patient's verbalization that he does not understand follow-up instructions and treatment plan. Per Mackenzie Gill NP, patient is to follow-up in Dr. Chase gomez in 2 weeks and that Dr. Chase gomez will notify him of plan of care at follow-up appointment. Received follow-up appointment time from Dr. Chase gomez. Called patient post-discharge via phone to notify him of follow-up appointment at Dr. Chase gomez on October 25, 2021 at 9:30 a.m. Patient verbalizes understanding of follow-up  appointment.

## 2021-10-18 NOTE — ANESTHESIA PREPROCEDURE EVALUATION
Relevant Problems   CARDIOVASCULAR   (+) Hypertension      RENAL FAILURE   (+) [facility]UNDE (acute [facility]) ([facility]   (+) ESRD (end stage renal disease) on dialysis ([facility])      HEMATOLOGY   (+) Anemia   (+) Symptomatic anemia       Anesthetic History   No history of anesthetic complications            Review of Systems / Medical History  Patient summary reviewed, nursing notes reviewed and pertinent labs reviewed    Pulmonary  Within defined limits                 Neuro/Psych   Within defined limits           Cardiovascular    Hypertension (Malignant Hypertension): poorly controlled                Comments: 7/22/2021 EKG    Diagnosis Sinus rhythm   Left atrial enlargement   Left ventricular hypertrophy     HTN- baseline >180/100       GI/Hepatic/Renal     GERD    Renal disease: ESRD and dialysis       Endo/Other        Cancer (esophageal)     Other Findings   Comments: COVID-19 Vaccine:   Unknown  Allergies  No Known Allergies  Ht: 5' 7.5\" (171.5 cm)  Weight: 57 kg (125 lb 9.6 oz)  BMI: 20.09 kg/m²  No watch meds found  CrCl:   9.1 mL/min (A)    Medical History  Hypertension  Kidney damage  Dialysis patient ([facility]  GERD (gastroesophageal reflux disease)  Chronic kidney disease  Chronic gastritis  Esophageal cancer           Physical Exam    Airway  Mallampati: III  TM Distance: 4 - 6 cm  Neck ROM: normal range of motion   Mouth opening: Normal     Cardiovascular    Rhythm: regular  Rate: normal         Dental    Dentition: Poor dentition     Pulmonary  Breath sounds clear to auscultation               Abdominal  GI exam deferred       Other Findings   Comments: Results for Dionisio Persona (MRN [id number]) as of 7/23/2021 09:56    7/22/2021 05:16  WBC: 5.8  NRBC: 0.0  RBC: 2.29 (L)  HGB: 7.3 (L)  HCT: 20.7 (L)  MCV: 90.3  MCH: 32.0  MCHC: 35.4  RDW: 20.9 (H)  PLATELET: 915  MPV: 7.7  ABSOLUTE NRBC: 0.00    Results for Abidaaris Razaa (MRN [id number]) as of 7/23/2021 09:56    7/21/2021 14:30  Sodium: 141  Potassium: 3.7  Chloride: 102  CO2: 31  Anion gap: 8  Glucose: 93  BUN: 23 (H)  Creatinine: 8.57 (H)  BUN/Creatinine ratio: 3 (L)  Calcium: 8.7  GFR est non-AA: 7 (L)  GFR est AA: 8 (L)  Bilirubin, total: 0.4  Protein, total: 6.3 (L)  Albumin: 2.9 (L)  Globulin: 3.4  A-G Ratio: 0.9 (L)  ALT: 6 (L)  AST: 11 (L)  Alk. phosphatase: 57  Troponin-I, Qt.: <0.05         Anesthetic Plan    ASA: 4  Anesthesia type: total IV anesthesia and MAC          Induction: Intravenous  Anesthetic plan and risks discussed with: Patient      Serum K prior to procedure. Elevated HTN is baseline-proceed with caution.

## 2021-10-18 NOTE — ANESTHESIA POSTPROCEDURE EVALUATION
Procedure(s):  ENDOSCOPIC ULTRASOUND (EUS). total IV anesthesia, MAC    Anesthesia Post Evaluation      Multimodal analgesia: multimodal analgesia not used between 6 hours prior to anesthesia start to PACU discharge  Patient location during evaluation: bedside (Endoscopy suite)  Patient participation: complete - patient cannot participate  Level of consciousness: sleepy but conscious  Pain score: 0  Pain management: adequate  Airway patency: patent  Anesthetic complications: no  Cardiovascular status: acceptable  Respiratory status: acceptable and nasal cannula  Hydration status: acceptable  Comments: This patient remained on the stretcher. The patient was handed off to the endoscopy nursing team.  All questions regarding pre-, intra-, and postoperative care were answered.   Post anesthesia nausea and vomiting:  none      INITIAL Post-op Vital signs:   Vitals Value Taken Time   /97 10/18/21 1143   Temp     Pulse 73 10/18/21 1143   Resp 17 10/18/21 1143   SpO2 100 % 10/18/21 1143

## 2021-10-18 NOTE — PROGRESS NOTES
Report given by Yenni Ritchie, she stated blood pressure was over 268 systolic. Pt has had all blood pressure medications and at high doses. She stated anesthesia needs to evaluate and make call if safe to sedate. She stated Ashlee Acevedo is going to put in vitals from ENDO unit and contact Dr. Kelly oLpez (Anesthesia). Asked Cyndi Garcia RN if Dr. Kelly Lopez is in a case and I could call. She stated that Dr. Kelly Lopez is in a case in ENDO unit. Retrieved new vital signs and will call endo unit in 15 minutes to follow up with Ashlee Acevedo and Dr. Kelly Lopez.

## 2022-01-01 ENCOUNTER — APPOINTMENT (OUTPATIENT)
Dept: GENERAL RADIOLOGY | Age: 42
DRG: 871 | End: 2022-01-01
Attending: HOSPITALIST
Payer: MEDICARE

## 2022-01-01 ENCOUNTER — HOSPITAL ENCOUNTER (EMERGENCY)
Age: 42
Discharge: HOME | End: 2022-01-19

## 2022-01-01 ENCOUNTER — HOSPITAL ENCOUNTER (INPATIENT)
Age: 42
LOS: 5 days | DRG: 871 | End: 2022-01-29
Attending: EMERGENCY MEDICINE | Admitting: HOSPITALIST
Payer: MEDICARE

## 2022-01-01 ENCOUNTER — HOSPITAL ENCOUNTER (EMERGENCY)
Age: 42
Discharge: LEFT BEFORE DISCHARGE | End: 2022-01-20
Attending: EMERGENCY MEDICINE | Admitting: HOSPITALIST
Payer: MEDICARE

## 2022-01-01 ENCOUNTER — APPOINTMENT (OUTPATIENT)
Dept: CT IMAGING | Age: 42
DRG: 871 | End: 2022-01-01
Attending: HOSPITALIST
Payer: MEDICARE

## 2022-01-01 ENCOUNTER — HOSPITAL ENCOUNTER (EMERGENCY)
Age: 42
Discharge: HOME | End: 2022-01-10
Attending: EMERGENCY MEDICINE
Payer: MEDICARE

## 2022-01-01 VITALS
OXYGEN SATURATION: 97 % | RESPIRATION RATE: 16 BRPM | HEART RATE: 79 BPM | HEIGHT: 65 IN | SYSTOLIC BLOOD PRESSURE: 148 MMHG | BODY MASS INDEX: 23.53 KG/M2 | TEMPERATURE: 97.4 F | WEIGHT: 141.2 LBS | DIASTOLIC BLOOD PRESSURE: 77 MMHG

## 2022-01-01 VITALS
OXYGEN SATURATION: 98 % | RESPIRATION RATE: 18 BRPM | WEIGHT: 120 LBS | TEMPERATURE: 97.4 F | HEIGHT: 65 IN | SYSTOLIC BLOOD PRESSURE: 158 MMHG | BODY MASS INDEX: 19.99 KG/M2 | HEART RATE: 92 BPM | DIASTOLIC BLOOD PRESSURE: 102 MMHG

## 2022-01-01 VITALS
HEIGHT: 65 IN | WEIGHT: 122 LBS | RESPIRATION RATE: 15 BRPM | HEART RATE: 69 BPM | OXYGEN SATURATION: 98 % | BODY MASS INDEX: 20.33 KG/M2 | SYSTOLIC BLOOD PRESSURE: 123 MMHG | TEMPERATURE: 97.7 F | DIASTOLIC BLOOD PRESSURE: 86 MMHG

## 2022-01-01 DIAGNOSIS — Z99.2 DEPENDENT ON HEMODIALYSIS (HCC): Primary | ICD-10-CM

## 2022-01-01 DIAGNOSIS — D64.9 SYMPTOMATIC ANEMIA: Primary | ICD-10-CM

## 2022-01-01 DIAGNOSIS — N18.6 END STAGE RENAL DISEASE (HCC): ICD-10-CM

## 2022-01-01 LAB
ABO + RH BLD: NORMAL
ABO + RH BLD: NORMAL
ALBUMIN SERPL-MCNC: 1.8 G/DL (ref 3.5–5)
ALBUMIN SERPL-MCNC: 2 G/DL (ref 3.5–5)
ALBUMIN SERPL-MCNC: 2.3 G/DL (ref 3.5–5)
ALBUMIN/GLOB SERPL: 0.6 {RATIO} (ref 1.1–2.2)
ALBUMIN/GLOB SERPL: 0.7 {RATIO} (ref 1.1–2.2)
ALBUMIN/GLOB SERPL: 0.7 {RATIO} (ref 1.1–2.2)
ALP SERPL-CCNC: 44 U/L (ref 45–117)
ALP SERPL-CCNC: 47 U/L (ref 45–117)
ALP SERPL-CCNC: 49 U/L (ref 45–117)
ALT SERPL-CCNC: 6 U/L (ref 12–78)
ALT SERPL-CCNC: 7 U/L (ref 12–78)
ALT SERPL-CCNC: <6 U/L (ref 12–78)
ANION GAP SERPL CALC-SCNC: 13 MMOL/L (ref 5–15)
ANION GAP SERPL CALC-SCNC: 14 MMOL/L (ref 5–15)
ANION GAP SERPL CALC-SCNC: 17 MMOL/L (ref 5–15)
ANION GAP SERPL CALC-SCNC: 17 MMOL/L (ref 5–15)
ANION GAP SERPL CALC-SCNC: 18 MMOL/L (ref 5–15)
ANION GAP SERPL CALC-SCNC: 19 MMOL/L (ref 5–15)
ANION GAP SERPL CALC-SCNC: 9 MMOL/L (ref 5–15)
ANION GAP SERPL CALC-SCNC: 9 MMOL/L (ref 5–15)
AST SERPL W P-5'-P-CCNC: 10 U/L (ref 15–37)
AST SERPL W P-5'-P-CCNC: 10 U/L (ref 15–37)
AST SERPL W P-5'-P-CCNC: 12 U/L (ref 15–37)
BASOPHILS # BLD: 0 K/UL (ref 0–0.2)
BASOPHILS NFR BLD: 0 % (ref 0–2.5)
BASOPHILS NFR BLD: 1 % (ref 0–2.5)
BILIRUB SERPL-MCNC: 0.2 MG/DL (ref 0.2–1)
BILIRUB SERPL-MCNC: 0.3 MG/DL (ref 0.2–1)
BILIRUB SERPL-MCNC: 0.3 MG/DL (ref 0.2–1)
BLD PROD TYP BPU: NORMAL
BLOOD GROUP ANTIBODIES SERPL: NEGATIVE
BLOOD GROUP ANTIBODIES SERPL: NEGATIVE
BPU ID: NORMAL
BUN SERPL-MCNC: 109 MG/DL (ref 6–20)
BUN SERPL-MCNC: 126 MG/DL (ref 6–20)
BUN SERPL-MCNC: 138 MG/DL (ref 6–20)
BUN SERPL-MCNC: 144 MG/DL (ref 6–20)
BUN SERPL-MCNC: 159 MG/DL (ref 6–20)
BUN SERPL-MCNC: 161 MG/DL (ref 6–20)
BUN SERPL-MCNC: 77 MG/DL (ref 6–20)
BUN SERPL-MCNC: 93 MG/DL (ref 6–20)
BUN/CREAT SERPL: 11 (ref 12–20)
BUN/CREAT SERPL: 11 (ref 12–20)
BUN/CREAT SERPL: 14 (ref 12–20)
BUN/CREAT SERPL: 15 (ref 12–20)
BUN/CREAT SERPL: 18 (ref 12–20)
BUN/CREAT SERPL: 7 (ref 12–20)
BUN/CREAT SERPL: 7 (ref 12–20)
BUN/CREAT SERPL: 9 (ref 12–20)
CA-I BLD-MCNC: 7.1 MG/DL (ref 8.5–10.1)
CA-I BLD-MCNC: 7.3 MG/DL (ref 8.5–10.1)
CA-I BLD-MCNC: 7.5 MG/DL (ref 8.5–10.1)
CA-I BLD-MCNC: 7.6 MG/DL (ref 8.5–10.1)
CA-I BLD-MCNC: 8 MG/DL (ref 8.5–10.1)
CA-I BLD-MCNC: 8.5 MG/DL (ref 8.5–10.1)
CA-I BLD-MCNC: 9.3 MG/DL (ref 8.5–10.1)
CA-I BLD-MCNC: 9.4 MG/DL (ref 8.5–10.1)
CHLORIDE SERPL-SCNC: 100 MMOL/L (ref 97–108)
CHLORIDE SERPL-SCNC: 95 MMOL/L (ref 97–108)
CHLORIDE SERPL-SCNC: 96 MMOL/L (ref 97–108)
CHLORIDE SERPL-SCNC: 96 MMOL/L (ref 97–108)
CHLORIDE SERPL-SCNC: 97 MMOL/L (ref 97–108)
CHLORIDE SERPL-SCNC: 97 MMOL/L (ref 97–108)
CO2 SERPL-SCNC: 18 MMOL/L (ref 21–32)
CO2 SERPL-SCNC: 20 MMOL/L (ref 21–32)
CO2 SERPL-SCNC: 20 MMOL/L (ref 21–32)
CO2 SERPL-SCNC: 21 MMOL/L (ref 21–32)
CO2 SERPL-SCNC: 22 MMOL/L (ref 21–32)
CO2 SERPL-SCNC: 23 MMOL/L (ref 21–32)
CO2 SERPL-SCNC: 27 MMOL/L (ref 21–32)
CO2 SERPL-SCNC: 28 MMOL/L (ref 21–32)
COVID-19 RAPID TEST, COVR: NOT DETECTED
CREAT SERPL-MCNC: 10.96 MG/DL (ref 0.7–1.3)
CREAT SERPL-MCNC: 11.15 MG/DL (ref 0.7–1.3)
CREAT SERPL-MCNC: 11.54 MG/DL (ref 0.7–1.3)
CREAT SERPL-MCNC: 12.81 MG/DL (ref 0.7–1.3)
CREAT SERPL-MCNC: 14.62 MG/DL (ref 0.7–1.3)
CREAT SERPL-MCNC: 15.32 MG/DL (ref 0.7–1.3)
CREAT SERPL-MCNC: 7.44 MG/DL (ref 0.7–1.3)
CREAT SERPL-MCNC: 8.01 MG/DL (ref 0.7–1.3)
CROSSMATCH RESULT,%XM: NORMAL
EOSINOPHIL # BLD: 0 K/UL (ref 0–0.7)
EOSINOPHIL # BLD: 0.2 K/UL (ref 0–0.7)
EOSINOPHIL NFR BLD: 0 % (ref 0.9–2.9)
EOSINOPHIL NFR BLD: 2 % (ref 0.9–2.9)
ERYTHROCYTE [DISTWIDTH] IN BLOOD BY AUTOMATED COUNT: 14.5 % (ref 11.5–14.5)
ERYTHROCYTE [DISTWIDTH] IN BLOOD BY AUTOMATED COUNT: 16.4 % (ref 11.5–14.5)
ERYTHROCYTE [DISTWIDTH] IN BLOOD BY AUTOMATED COUNT: 16.6 % (ref 11.5–14.5)
ERYTHROCYTE [DISTWIDTH] IN BLOOD BY AUTOMATED COUNT: 17.3 % (ref 11.5–14.5)
ERYTHROCYTE [DISTWIDTH] IN BLOOD BY AUTOMATED COUNT: 17.3 % (ref 11.5–14.5)
ERYTHROCYTE [DISTWIDTH] IN BLOOD BY AUTOMATED COUNT: 17.6 % (ref 11.5–14.5)
ERYTHROCYTE [DISTWIDTH] IN BLOOD BY AUTOMATED COUNT: 17.6 % (ref 11.5–14.5)
ERYTHROCYTE [DISTWIDTH] IN BLOOD BY AUTOMATED COUNT: 18.6 % (ref 11.5–14.5)
FLUAV RNA SPEC QL NAA+PROBE: NOT DETECTED
FLUBV RNA SPEC QL NAA+PROBE: NOT DETECTED
GLOBULIN SER CALC-MCNC: 2.6 G/DL (ref 2–4)
GLOBULIN SER CALC-MCNC: 2.9 G/DL (ref 2–4)
GLOBULIN SER CALC-MCNC: 3.6 G/DL (ref 2–4)
GLUCOSE SERPL-MCNC: 102 MG/DL (ref 65–100)
GLUCOSE SERPL-MCNC: 104 MG/DL (ref 65–100)
GLUCOSE SERPL-MCNC: 107 MG/DL (ref 65–100)
GLUCOSE SERPL-MCNC: 112 MG/DL (ref 65–100)
GLUCOSE SERPL-MCNC: 55 MG/DL (ref 65–100)
GLUCOSE SERPL-MCNC: 62 MG/DL (ref 65–100)
GLUCOSE SERPL-MCNC: 80 MG/DL (ref 65–100)
GLUCOSE SERPL-MCNC: 81 MG/DL (ref 65–100)
HBV SURFACE AB SER QL: REACTIVE
HBV SURFACE AB SER-ACNC: 28.48 MIU/ML
HBV SURFACE AG SER QL: <0.1 INDEX
HBV SURFACE AG SER QL: NEGATIVE
HCT VFR BLD AUTO: 17.2 % (ref 41–53)
HCT VFR BLD AUTO: 18.1 % (ref 41–53)
HCT VFR BLD AUTO: 19.6 % (ref 41–53)
HCT VFR BLD AUTO: 21.7 % (ref 41–53)
HCT VFR BLD AUTO: 22 % (ref 41–53)
HCT VFR BLD AUTO: 22.1 % (ref 41–53)
HCT VFR BLD AUTO: 24.3 % (ref 41–53)
HCT VFR BLD AUTO: 26.1 % (ref 41–53)
HGB BLD-MCNC: 5.9 G/DL (ref 13.5–17.5)
HGB BLD-MCNC: 6.2 G/DL (ref 13.5–17.5)
HGB BLD-MCNC: 6.9 G/DL (ref 13.5–17.5)
HGB BLD-MCNC: 7.5 G/DL (ref 13.5–17.5)
HGB BLD-MCNC: 7.6 G/DL (ref 13–16)
HGB BLD-MCNC: 7.7 G/DL (ref 13.5–17.5)
HGB BLD-MCNC: 8.3 G/DL (ref 13.5–17.5)
HGB BLD-MCNC: 9 G/DL (ref 13.5–17.5)
INR PPP: 1 (ref 0.9–1.1)
INR PPP: 1.1 (ref 0.9–1.1)
IRON SATN MFR SERPL: 71 % (ref 20–50)
IRON SERPL-MCNC: 68 UG/DL (ref 35–150)
LYMPHOCYTES # BLD: 0.2 K/UL (ref 1–4.8)
LYMPHOCYTES # BLD: 0.2 K/UL (ref 1–4.8)
LYMPHOCYTES # BLD: 0.3 K/UL (ref 1–4.8)
LYMPHOCYTES # BLD: 0.4 K/UL (ref 1–4.8)
LYMPHOCYTES # BLD: 0.4 K/UL (ref 1–4.8)
LYMPHOCYTES # BLD: 0.5 K/UL (ref 1–4.8)
LYMPHOCYTES # BLD: 0.6 K/UL (ref 1–4.8)
LYMPHOCYTES # BLD: 1.1 K/UL (ref 1–4.8)
LYMPHOCYTES NFR BLD: 10 % (ref 20.5–51.1)
LYMPHOCYTES NFR BLD: 11 % (ref 20.5–51.1)
LYMPHOCYTES NFR BLD: 15 % (ref 20.5–51.1)
LYMPHOCYTES NFR BLD: 6 % (ref 20.5–51.1)
LYMPHOCYTES NFR BLD: 6 % (ref 20.5–51.1)
LYMPHOCYTES NFR BLD: 8 % (ref 20.5–51.1)
LYMPHOCYTES NFR BLD: 9 % (ref 20.5–51.1)
LYMPHOCYTES NFR BLD: 9 % (ref 20.5–51.1)
MCH RBC QN AUTO: 31.3 PG (ref 31–34)
MCH RBC QN AUTO: 31.5 PG (ref 31–34)
MCH RBC QN AUTO: 31.7 PG (ref 31–34)
MCH RBC QN AUTO: 31.8 PG (ref 31–34)
MCH RBC QN AUTO: 32 PG (ref 31–34)
MCH RBC QN AUTO: 33.1 PG (ref 31–34)
MCH RBC QN AUTO: 33.1 PG (ref 31–34)
MCH RBC QN AUTO: 35.9 PG (ref 31–34)
MCHC RBC AUTO-ENTMCNC: 34.2 G/DL (ref 31–36)
MCHC RBC AUTO-ENTMCNC: 34.2 G/DL (ref 31–36)
MCHC RBC AUTO-ENTMCNC: 34.4 G/DL (ref 31–36)
MCHC RBC AUTO-ENTMCNC: 34.4 G/DL (ref 31–36)
MCHC RBC AUTO-ENTMCNC: 34.6 G/DL (ref 31–36)
MCHC RBC AUTO-ENTMCNC: 34.8 G/DL (ref 31–36)
MCHC RBC AUTO-ENTMCNC: 35 G/DL (ref 31–36)
MCHC RBC AUTO-ENTMCNC: 38.8 G/DL (ref 31–36)
MCV RBC AUTO: 90.4 FL (ref 80–100)
MCV RBC AUTO: 91.5 FL (ref 80–100)
MCV RBC AUTO: 91.6 FL (ref 80–100)
MCV RBC AUTO: 92 FL (ref 80–100)
MCV RBC AUTO: 92.1 FL (ref 80–100)
MCV RBC AUTO: 92.5 FL (ref 80–100)
MCV RBC AUTO: 96.1 FL (ref 80–100)
MCV RBC AUTO: 96.5 FL (ref 80–100)
MONOCYTES # BLD: 0.2 K/UL (ref 0.2–2.4)
MONOCYTES # BLD: 0.2 K/UL (ref 0–0.8)
MONOCYTES # BLD: 0.3 K/UL (ref 0.2–2.4)
MONOCYTES NFR BLD: 2 % (ref 3.1–13.9)
MONOCYTES NFR BLD: 3 % (ref 1.7–9.3)
MONOCYTES NFR BLD: 3 % (ref 1.7–9.3)
MONOCYTES NFR BLD: 4 % (ref 1.7–9.3)
MONOCYTES NFR BLD: 5 % (ref 1.7–9.3)
MONOCYTES NFR BLD: 5 % (ref 1.7–9.3)
MONOCYTES NFR BLD: 7 % (ref 1.7–9.3)
MONOCYTES NFR BLD: 7 % (ref 1.7–9.3)
NEUTS SEG # BLD: 2 K/UL (ref 1.8–7.7)
NEUTS SEG # BLD: 2.4 K/UL (ref 1.8–7.7)
NEUTS SEG # BLD: 3.2 K/UL (ref 1.8–7.7)
NEUTS SEG # BLD: 3.9 K/UL (ref 1.8–7.7)
NEUTS SEG # BLD: 4.7 K/UL (ref 1.8–7.7)
NEUTS SEG # BLD: 5.7 K/UL (ref 1.8–7.7)
NEUTS SEG # BLD: 6.5 K/UL (ref 1.8–7.7)
NEUTS SEG # BLD: 9.5 K/UL (ref 1.8–7.7)
NEUTS SEG NFR BLD: 81 % (ref 42–75)
NEUTS SEG NFR BLD: 83 % (ref 42–75)
NEUTS SEG NFR BLD: 83 % (ref 42–75)
NEUTS SEG NFR BLD: 84 % (ref 42–75)
NEUTS SEG NFR BLD: 86 % (ref 42–75)
NEUTS SEG NFR BLD: 88 % (ref 42–75)
NEUTS SEG NFR BLD: 91 % (ref 42–75)
NEUTS SEG NFR BLD: 91 % (ref 42–75)
NRBC # BLD: 0 K/UL
NRBC # BLD: 0.01 K/UL
NRBC # BLD: 0.02 K/UL
NRBC BLD-RTO: 0 PER 100 WBC
NRBC BLD-RTO: 0.1 PER 100 WBC
NRBC BLD-RTO: 0.2 PER 100 WBC
NRBC BLD-RTO: 0.4 PER 100 WBC
NRBC BLD-RTO: 0.6 PER 100 WBC
PHOSPHATE SERPL-MCNC: 10.6 MG/DL (ref 2.6–4.7)
PLATELET # BLD AUTO: 132 K/UL (ref 150–400)
PLATELET # BLD AUTO: 159 K/UL (ref 150–400)
PLATELET # BLD AUTO: 240 K/UL (ref 150–400)
PLATELET # BLD AUTO: 253 K/UL
PLATELET # BLD AUTO: 291 K/UL (ref 150–400)
PLATELET # BLD AUTO: 299 K/UL (ref 150–400)
PLATELET # BLD AUTO: 76 K/UL (ref 150–400)
PLATELET # BLD AUTO: 97 K/UL (ref 150–400)
PMV BLD AUTO: 7 FL (ref 6.5–11.5)
PMV BLD AUTO: 7.7 FL (ref 6.5–11.5)
PMV BLD AUTO: 8 FL (ref 6.5–11.5)
PMV BLD AUTO: 8.2 FL (ref 6.5–11.5)
PMV BLD AUTO: 8.7 FL (ref 6.5–11.5)
PMV BLD AUTO: 9.1 FL (ref 6.5–11.5)
PMV BLD AUTO: 9.4 FL (ref 6.5–11.5)
PMV BLD AUTO: 9.8 FL (ref 6.5–11.5)
POTASSIUM SERPL-SCNC: 3.2 MMOL/L (ref 3.5–5.1)
POTASSIUM SERPL-SCNC: 3.5 MMOL/L (ref 3.5–5.1)
POTASSIUM SERPL-SCNC: 4.4 MMOL/L (ref 3.5–5.1)
POTASSIUM SERPL-SCNC: 4.4 MMOL/L (ref 3.5–5.1)
POTASSIUM SERPL-SCNC: 4.8 MMOL/L (ref 3.5–5.1)
POTASSIUM SERPL-SCNC: 5 MMOL/L (ref 3.5–5.1)
POTASSIUM SERPL-SCNC: 5.9 MMOL/L (ref 3.5–5.1)
POTASSIUM SERPL-SCNC: 6.1 MMOL/L (ref 3.5–5.1)
PROT SERPL-MCNC: 4.4 G/DL (ref 6.4–8.2)
PROT SERPL-MCNC: 4.9 G/DL (ref 6.4–8.2)
PROT SERPL-MCNC: 5.9 G/DL (ref 6.4–8.2)
PROTHROMBIN TIME: 10.3 SEC (ref 9–11.1)
PROTHROMBIN TIME: 11.1 SEC (ref 9–11.1)
RBC # BLD AUTO: 1.79 M/UL (ref 4.5–5.9)
RBC # BLD AUTO: 1.87 M/UL (ref 4.5–5.9)
RBC # BLD AUTO: 2.13 M/UL
RBC # BLD AUTO: 2.17 M/UL (ref 4.5–5.9)
RBC # BLD AUTO: 2.37 M/UL (ref 4.5–5.9)
RBC # BLD AUTO: 2.4 M/UL (ref 4.5–5.9)
RBC # BLD AUTO: 2.66 M/UL (ref 4.5–5.9)
RBC # BLD AUTO: 2.84 M/UL (ref 4.5–5.9)
SARS-COV-2, COV2: DETECTED
SODIUM SERPL-SCNC: 132 MMOL/L (ref 136–145)
SODIUM SERPL-SCNC: 133 MMOL/L (ref 136–145)
SODIUM SERPL-SCNC: 133 MMOL/L (ref 136–145)
SODIUM SERPL-SCNC: 136 MMOL/L (ref 136–145)
SODIUM SERPL-SCNC: 136 MMOL/L (ref 136–145)
SPECIMEN EXP DATE BLD: NORMAL
SPECIMEN EXP DATE BLD: NORMAL
STATUS OF UNIT,%ST: NORMAL
TIBC SERPL-MCNC: 96 UG/DL (ref 250–450)
TRANSFUSION STATUS PATIENT QL: NORMAL
TROPONIN-HIGH SENSITIVITY: 52 NG/L (ref 0–76)
UNIT DIVISION, %UDIV: 0
WBC # BLD AUTO: 10.5 K/UL (ref 4.4–11.3)
WBC # BLD AUTO: 2.4 K/UL (ref 4.4–11.3)
WBC # BLD AUTO: 2.9 K/UL (ref 4.4–11.3)
WBC # BLD AUTO: 3.7 K/UL (ref 4.4–11.3)
WBC # BLD AUTO: 4.7 K/UL (ref 4.4–11.3)
WBC # BLD AUTO: 5.3 K/UL (ref 4.4–11.3)
WBC # BLD AUTO: 7.1 K/UL (ref 4.4–11.3)
WBC # BLD AUTO: 7.1 K/UL (ref 4.4–11.3)

## 2022-01-01 PROCEDURE — 36415 COLL VENOUS BLD VENIPUNCTURE: CPT

## 2022-01-01 PROCEDURE — 74011250636 HC RX REV CODE- 250/636: Performed by: HOSPITALIST

## 2022-01-01 PROCEDURE — 90935 HEMODIALYSIS ONE EVALUATION: CPT

## 2022-01-01 PROCEDURE — 30233N1 TRANSFUSION OF NONAUTOLOGOUS RED BLOOD CELLS INTO PERIPHERAL VEIN, PERCUTANEOUS APPROACH: ICD-10-PCS | Performed by: HOSPITALIST

## 2022-01-01 PROCEDURE — 74011250637 HC RX REV CODE- 250/637: Performed by: INTERNAL MEDICINE

## 2022-01-01 PROCEDURE — 74011250636 HC RX REV CODE- 250/636: Performed by: INTERNAL MEDICINE

## 2022-01-01 PROCEDURE — 80048 BASIC METABOLIC PNL TOTAL CA: CPT

## 2022-01-01 PROCEDURE — 92950 HEART/LUNG RESUSCITATION CPR: CPT

## 2022-01-01 PROCEDURE — 99284 EMERGENCY DEPT VISIT MOD MDM: CPT

## 2022-01-01 PROCEDURE — 74011250636 HC RX REV CODE- 250/636: Performed by: EMERGENCY MEDICINE

## 2022-01-01 PROCEDURE — 74011000250 HC RX REV CODE- 250: Performed by: HOSPITALIST

## 2022-01-01 PROCEDURE — 5A1D70Z PERFORMANCE OF URINARY FILTRATION, INTERMITTENT, LESS THAN 6 HOURS PER DAY: ICD-10-PCS | Performed by: INTERNAL MEDICINE

## 2022-01-01 PROCEDURE — 85025 COMPLETE CBC W/AUTO DIFF WBC: CPT

## 2022-01-01 PROCEDURE — 94760 N-INVAS EAR/PLS OXIMETRY 1: CPT

## 2022-01-01 PROCEDURE — 85610 PROTHROMBIN TIME: CPT

## 2022-01-01 PROCEDURE — 87340 HEPATITIS B SURFACE AG IA: CPT

## 2022-01-01 PROCEDURE — 65270000029 HC RM PRIVATE

## 2022-01-01 PROCEDURE — 77010033678 HC OXYGEN DAILY

## 2022-01-01 PROCEDURE — 83540 ASSAY OF IRON: CPT

## 2022-01-01 PROCEDURE — 86923 COMPATIBILITY TEST ELECTRIC: CPT

## 2022-01-01 PROCEDURE — 86900 BLOOD TYPING SEROLOGIC ABO: CPT

## 2022-01-01 PROCEDURE — 74011250637 HC RX REV CODE- 250/637: Performed by: FAMILY MEDICINE

## 2022-01-01 PROCEDURE — 71045 X-RAY EXAM CHEST 1 VIEW: CPT

## 2022-01-01 PROCEDURE — P9047 ALBUMIN (HUMAN), 25%, 50ML: HCPCS | Performed by: FAMILY MEDICINE

## 2022-01-01 PROCEDURE — 86706 HEP B SURFACE ANTIBODY: CPT

## 2022-01-01 PROCEDURE — 74011250637 HC RX REV CODE- 250/637: Performed by: HOSPITALIST

## 2022-01-01 PROCEDURE — 84100 ASSAY OF PHOSPHORUS: CPT

## 2022-01-01 PROCEDURE — 99282 EMERGENCY DEPT VISIT SF MDM: CPT

## 2022-01-01 PROCEDURE — 87636 SARSCOV2 & INF A&B AMP PRB: CPT

## 2022-01-01 PROCEDURE — G0378 HOSPITAL OBSERVATION PER HR: HCPCS

## 2022-01-01 PROCEDURE — 80053 COMPREHEN METABOLIC PANEL: CPT

## 2022-01-01 PROCEDURE — 74011250636 HC RX REV CODE- 250/636: Performed by: FAMILY MEDICINE

## 2022-01-01 PROCEDURE — 36430 TRANSFUSION BLD/BLD COMPNT: CPT

## 2022-01-01 PROCEDURE — 93005 ELECTROCARDIOGRAM TRACING: CPT

## 2022-01-01 PROCEDURE — 99222 1ST HOSP IP/OBS MODERATE 55: CPT | Performed by: INTERNAL MEDICINE

## 2022-01-01 PROCEDURE — P9016 RBC LEUKOCYTES REDUCED: HCPCS

## 2022-01-01 PROCEDURE — 84484 ASSAY OF TROPONIN QUANT: CPT

## 2022-01-01 PROCEDURE — P9047 ALBUMIN (HUMAN), 25%, 50ML: HCPCS | Performed by: INTERNAL MEDICINE

## 2022-01-01 PROCEDURE — 87635 SARS-COV-2 COVID-19 AMP PRB: CPT

## 2022-01-01 PROCEDURE — 71275 CT ANGIOGRAPHY CHEST: CPT

## 2022-01-01 PROCEDURE — 74011000636 HC RX REV CODE- 636: Performed by: HOSPITALIST

## 2022-01-01 RX ORDER — MIDODRINE HYDROCHLORIDE 5 MG/1
5 TABLET ORAL
Status: DISCONTINUED | OUTPATIENT
Start: 2022-01-01 | End: 2022-01-01 | Stop reason: HOSPADM

## 2022-01-01 RX ORDER — VERAPAMIL HYDROCHLORIDE 80 MG/1
120 TABLET ORAL 2 TIMES DAILY
Status: DISCONTINUED | OUTPATIENT
Start: 2022-01-01 | End: 2022-01-01 | Stop reason: HOSPADM

## 2022-01-01 RX ORDER — ACETAMINOPHEN 650 MG/1
650 SUPPOSITORY RECTAL
Status: DISCONTINUED | OUTPATIENT
Start: 2022-01-01 | End: 2022-01-01 | Stop reason: HOSPADM

## 2022-01-01 RX ORDER — DOXAZOSIN 2 MG/1
4 TABLET ORAL
Status: DISCONTINUED | OUTPATIENT
Start: 2022-01-01 | End: 2022-01-01 | Stop reason: HOSPADM

## 2022-01-01 RX ORDER — ACETAMINOPHEN 325 MG/1
650 TABLET ORAL
Status: DISCONTINUED | OUTPATIENT
Start: 2022-01-01 | End: 2022-01-01 | Stop reason: HOSPADM

## 2022-01-01 RX ORDER — SODIUM CHLORIDE 9 MG/ML
250 INJECTION, SOLUTION INTRAVENOUS AS NEEDED
Status: DISCONTINUED | OUTPATIENT
Start: 2022-01-01 | End: 2022-01-01 | Stop reason: HOSPADM

## 2022-01-01 RX ORDER — GABAPENTIN 100 MG/1
200 CAPSULE ORAL
Status: DISCONTINUED | OUTPATIENT
Start: 2022-01-01 | End: 2022-01-01

## 2022-01-01 RX ORDER — MIDODRINE HYDROCHLORIDE 5 MG/1
5 TABLET ORAL ONCE
Status: COMPLETED | OUTPATIENT
Start: 2022-01-01 | End: 2022-01-01

## 2022-01-01 RX ORDER — GABAPENTIN 100 MG/1
100 CAPSULE ORAL
Status: DISCONTINUED | OUTPATIENT
Start: 2022-01-01 | End: 2022-01-01 | Stop reason: HOSPADM

## 2022-01-01 RX ORDER — METOPROLOL TARTRATE 50 MG/1
50 TABLET ORAL 2 TIMES DAILY
Status: DISCONTINUED | OUTPATIENT
Start: 2022-01-01 | End: 2022-01-01 | Stop reason: HOSPADM

## 2022-01-01 RX ORDER — ALBUMIN HUMAN 250 G/1000ML
12.5 SOLUTION INTRAVENOUS ONCE
Status: COMPLETED | OUTPATIENT
Start: 2022-01-01 | End: 2022-01-01

## 2022-01-01 RX ORDER — SEVELAMER CARBONATE 800 MG/1
2400 TABLET, FILM COATED ORAL
Status: DISCONTINUED | OUTPATIENT
Start: 2022-01-01 | End: 2022-01-01 | Stop reason: HOSPADM

## 2022-01-01 RX ORDER — SODIUM CHLORIDE 0.9 % (FLUSH) 0.9 %
5-40 SYRINGE (ML) INJECTION AS NEEDED
Status: DISCONTINUED | OUTPATIENT
Start: 2022-01-01 | End: 2022-01-01 | Stop reason: HOSPADM

## 2022-01-01 RX ORDER — PANTOPRAZOLE SODIUM 40 MG/1
40 TABLET, DELAYED RELEASE ORAL DAILY
Status: DISCONTINUED | OUTPATIENT
Start: 2022-01-01 | End: 2022-01-01 | Stop reason: HOSPADM

## 2022-01-01 RX ORDER — LOSARTAN POTASSIUM 100 MG/1
100 TABLET ORAL DAILY
Status: DISCONTINUED | OUTPATIENT
Start: 2022-01-01 | End: 2022-01-01 | Stop reason: HOSPADM

## 2022-01-01 RX ORDER — SUCRALFATE 1 G/1
1 TABLET ORAL
Status: DISCONTINUED | OUTPATIENT
Start: 2022-01-01 | End: 2022-01-01 | Stop reason: HOSPADM

## 2022-01-01 RX ORDER — ONDANSETRON 4 MG/1
4 TABLET, ORALLY DISINTEGRATING ORAL
Status: DISCONTINUED | OUTPATIENT
Start: 2022-01-01 | End: 2022-01-01 | Stop reason: HOSPADM

## 2022-01-01 RX ORDER — SEVELAMER CARBONATE 800 MG/1
800 TABLET, FILM COATED ORAL
Status: DISCONTINUED | OUTPATIENT
Start: 2022-01-01 | End: 2022-01-01

## 2022-01-01 RX ORDER — ALBUMIN HUMAN 250 G/1000ML
12.5 SOLUTION INTRAVENOUS EVERY 6 HOURS
Status: DISCONTINUED | OUTPATIENT
Start: 2022-01-01 | End: 2022-01-01 | Stop reason: HOSPADM

## 2022-01-01 RX ORDER — TERAZOSIN 5 MG/1
5 CAPSULE ORAL
Status: DISCONTINUED | OUTPATIENT
Start: 2022-01-01 | End: 2022-01-01

## 2022-01-01 RX ORDER — POLYETHYLENE GLYCOL 3350 17 G/17G
17 POWDER, FOR SOLUTION ORAL DAILY PRN
Status: DISCONTINUED | OUTPATIENT
Start: 2022-01-01 | End: 2022-01-01 | Stop reason: HOSPADM

## 2022-01-01 RX ORDER — ONDANSETRON 2 MG/ML
4 INJECTION INTRAMUSCULAR; INTRAVENOUS
Status: DISCONTINUED | OUTPATIENT
Start: 2022-01-01 | End: 2022-01-01 | Stop reason: HOSPADM

## 2022-01-01 RX ORDER — SODIUM CHLORIDE 0.9 % (FLUSH) 0.9 %
5-40 SYRINGE (ML) INJECTION EVERY 8 HOURS
Status: DISCONTINUED | OUTPATIENT
Start: 2022-01-01 | End: 2022-01-01 | Stop reason: HOSPADM

## 2022-01-01 RX ORDER — SODIUM POLYSTYRENE SULFONATE 15 G/60ML
30 SUSPENSION ORAL; RECTAL 2 TIMES DAILY
Status: DISCONTINUED | OUTPATIENT
Start: 2022-01-01 | End: 2022-01-01

## 2022-01-01 RX ORDER — HEPARIN SODIUM 5000 [USP'U]/ML
5000 INJECTION, SOLUTION INTRAVENOUS; SUBCUTANEOUS EVERY 8 HOURS
Status: DISCONTINUED | OUTPATIENT
Start: 2022-01-01 | End: 2022-01-01 | Stop reason: HOSPADM

## 2022-01-01 RX ORDER — HYDRALAZINE HYDROCHLORIDE 100 MG/1
100 TABLET, FILM COATED ORAL 3 TIMES DAILY
Status: DISCONTINUED | OUTPATIENT
Start: 2022-01-01 | End: 2022-01-01 | Stop reason: HOSPADM

## 2022-01-01 RX ADMIN — ACETAMINOPHEN 650 MG: 325 TABLET ORAL at 03:54

## 2022-01-01 RX ADMIN — HYDRALAZINE HYDROCHLORIDE 100 MG: 100 TABLET, FILM COATED ORAL at 22:08

## 2022-01-01 RX ADMIN — MIDODRINE HYDROCHLORIDE 5 MG: 5 TABLET ORAL at 16:44

## 2022-01-01 RX ADMIN — SEVELAMER CARBONATE 2400 MG: 800 TABLET, FILM COATED ORAL at 13:14

## 2022-01-01 RX ADMIN — EPOETIN ALFA-EPBX 10000 UNITS: 10000 INJECTION, SOLUTION INTRAVENOUS; SUBCUTANEOUS at 15:46

## 2022-01-01 RX ADMIN — LOSARTAN POTASSIUM 100 MG: 100 TABLET, FILM COATED ORAL at 09:40

## 2022-01-01 RX ADMIN — MIDODRINE HYDROCHLORIDE 5 MG: 5 TABLET ORAL at 10:44

## 2022-01-01 RX ADMIN — ALBUMIN (HUMAN) 12.5 G: 0.25 INJECTION, SOLUTION INTRAVENOUS at 00:04

## 2022-01-01 RX ADMIN — GABAPENTIN 200 MG: 100 CAPSULE ORAL at 22:48

## 2022-01-01 RX ADMIN — HEPARIN SODIUM 5000 UNITS: 5000 INJECTION INTRAVENOUS; SUBCUTANEOUS at 06:23

## 2022-01-01 RX ADMIN — HEPARIN SODIUM 5000 UNITS: 5000 INJECTION INTRAVENOUS; SUBCUTANEOUS at 23:24

## 2022-01-01 RX ADMIN — PANTOPRAZOLE SODIUM 40 MG: 40 TABLET, DELAYED RELEASE ORAL at 09:19

## 2022-01-01 RX ADMIN — PANTOPRAZOLE SODIUM 40 MG: 40 TABLET, DELAYED RELEASE ORAL at 10:44

## 2022-01-01 RX ADMIN — HYDRALAZINE HYDROCHLORIDE 100 MG: 100 TABLET, FILM COATED ORAL at 22:49

## 2022-01-01 RX ADMIN — SEVELAMER CARBONATE 2400 MG: 800 TABLET, FILM COATED ORAL at 09:50

## 2022-01-01 RX ADMIN — DOXAZOSIN 4 MG: 2 TABLET ORAL at 22:09

## 2022-01-01 RX ADMIN — SEVELAMER CARBONATE 2400 MG: 800 TABLET, FILM COATED ORAL at 14:09

## 2022-01-01 RX ADMIN — SEVELAMER CARBONATE 2400 MG: 800 TABLET, FILM COATED ORAL at 10:43

## 2022-01-01 RX ADMIN — SODIUM CHLORIDE, PRESERVATIVE FREE 10 ML: 5 INJECTION INTRAVENOUS at 06:33

## 2022-01-01 RX ADMIN — DOXAZOSIN 4 MG: 2 TABLET ORAL at 22:51

## 2022-01-01 RX ADMIN — SODIUM CHLORIDE, PRESERVATIVE FREE 10 ML: 5 INJECTION INTRAVENOUS at 05:00

## 2022-01-01 RX ADMIN — SODIUM CHLORIDE, PRESERVATIVE FREE 10 ML: 5 INJECTION INTRAVENOUS at 13:14

## 2022-01-01 RX ADMIN — SUCRALFATE 1 G: 1 TABLET ORAL at 16:44

## 2022-01-01 RX ADMIN — SODIUM CHLORIDE, PRESERVATIVE FREE 10 ML: 5 INJECTION INTRAVENOUS at 14:16

## 2022-01-01 RX ADMIN — SUCRALFATE 1 G: 1 TABLET ORAL at 16:00

## 2022-01-01 RX ADMIN — ONDANSETRON 4 MG: 4 TABLET, ORALLY DISINTEGRATING ORAL at 20:35

## 2022-01-01 RX ADMIN — SEVELAMER CARBONATE 2400 MG: 800 TABLET, FILM COATED ORAL at 17:50

## 2022-01-01 RX ADMIN — VERAPAMIL HYDROCHLORIDE 120 MG: 80 TABLET, FILM COATED ORAL at 22:49

## 2022-01-01 RX ADMIN — ALBUMIN (HUMAN) 12.5 G: 0.25 INJECTION, SOLUTION INTRAVENOUS at 06:33

## 2022-01-01 RX ADMIN — HYDRALAZINE HYDROCHLORIDE 100 MG: 100 TABLET, FILM COATED ORAL at 23:24

## 2022-01-01 RX ADMIN — HYDRALAZINE HYDROCHLORIDE 100 MG: 100 TABLET, FILM COATED ORAL at 16:00

## 2022-01-01 RX ADMIN — EPOETIN ALFA-EPBX 10000 UNITS: 10000 INJECTION, SOLUTION INTRAVENOUS; SUBCUTANEOUS at 09:31

## 2022-01-01 RX ADMIN — HEPARIN SODIUM 5000 UNITS: 5000 INJECTION INTRAVENOUS; SUBCUTANEOUS at 22:51

## 2022-01-01 RX ADMIN — VERAPAMIL HYDROCHLORIDE 120 MG: 80 TABLET, FILM COATED ORAL at 09:40

## 2022-01-01 RX ADMIN — METOPROLOL TARTRATE 50 MG: 50 TABLET, FILM COATED ORAL at 22:52

## 2022-01-01 RX ADMIN — SUCRALFATE 1 G: 1 TABLET ORAL at 10:44

## 2022-01-01 RX ADMIN — HYDRALAZINE HYDROCHLORIDE 100 MG: 100 TABLET, FILM COATED ORAL at 09:40

## 2022-01-01 RX ADMIN — VERAPAMIL HYDROCHLORIDE 120 MG: 80 TABLET, FILM COATED ORAL at 22:08

## 2022-01-01 RX ADMIN — HEPARIN SODIUM 5000 UNITS: 5000 INJECTION INTRAVENOUS; SUBCUTANEOUS at 14:08

## 2022-01-01 RX ADMIN — SEVELAMER CARBONATE 2400 MG: 800 TABLET, FILM COATED ORAL at 12:47

## 2022-01-01 RX ADMIN — MIDODRINE HYDROCHLORIDE 5 MG: 5 TABLET ORAL at 17:50

## 2022-01-01 RX ADMIN — ALBUMIN (HUMAN) 12.5 G: 0.25 INJECTION, SOLUTION INTRAVENOUS at 17:50

## 2022-01-01 RX ADMIN — SODIUM CHLORIDE, PRESERVATIVE FREE 10 ML: 5 INJECTION INTRAVENOUS at 06:30

## 2022-01-01 RX ADMIN — SEVELAMER CARBONATE 800 MG: 800 TABLET, FILM COATED ORAL at 12:01

## 2022-01-01 RX ADMIN — GABAPENTIN 100 MG: 100 CAPSULE ORAL at 21:17

## 2022-01-01 RX ADMIN — METOPROLOL TARTRATE 50 MG: 50 TABLET, FILM COATED ORAL at 22:10

## 2022-01-01 RX ADMIN — MIDODRINE HYDROCHLORIDE 5 MG: 5 TABLET ORAL at 09:50

## 2022-01-01 RX ADMIN — MIDODRINE HYDROCHLORIDE 5 MG: 5 TABLET ORAL at 13:13

## 2022-01-01 RX ADMIN — SODIUM CHLORIDE, PRESERVATIVE FREE 10 ML: 5 INJECTION INTRAVENOUS at 16:38

## 2022-01-01 RX ADMIN — METOPROLOL TARTRATE 50 MG: 50 TABLET, FILM COATED ORAL at 09:40

## 2022-01-01 RX ADMIN — METOPROLOL TARTRATE 50 MG: 50 TABLET, FILM COATED ORAL at 23:24

## 2022-01-01 RX ADMIN — SODIUM CHLORIDE, PRESERVATIVE FREE 10 ML: 5 INJECTION INTRAVENOUS at 21:17

## 2022-01-01 RX ADMIN — SUCRALFATE 1 G: 1 TABLET ORAL at 06:30

## 2022-01-01 RX ADMIN — HEPARIN SODIUM 5000 UNITS: 5000 INJECTION INTRAVENOUS; SUBCUTANEOUS at 22:09

## 2022-01-01 RX ADMIN — GABAPENTIN 200 MG: 100 CAPSULE ORAL at 23:23

## 2022-01-01 RX ADMIN — SODIUM CHLORIDE, PRESERVATIVE FREE 10 ML: 5 INJECTION INTRAVENOUS at 06:24

## 2022-01-01 RX ADMIN — GABAPENTIN 100 MG: 100 CAPSULE ORAL at 22:10

## 2022-01-01 RX ADMIN — IOPAMIDOL 100 ML: 755 INJECTION, SOLUTION INTRAVENOUS at 13:32

## 2022-01-01 RX ADMIN — MIDODRINE HYDROCHLORIDE 5 MG: 5 TABLET ORAL at 05:00

## 2022-01-01 RX ADMIN — PANTOPRAZOLE SODIUM 40 MG: 40 TABLET, DELAYED RELEASE ORAL at 12:47

## 2022-01-01 RX ADMIN — SODIUM POLYSTYRENE SULFONATE 30 G: 15 SUSPENSION ORAL; RECTAL at 09:19

## 2022-01-01 RX ADMIN — SUCRALFATE 1 G: 1 TABLET ORAL at 17:50

## 2022-01-01 RX ADMIN — SODIUM CHLORIDE, PRESERVATIVE FREE 10 ML: 5 INJECTION INTRAVENOUS at 05:11

## 2022-01-01 RX ADMIN — SEVELAMER CARBONATE 800 MG: 800 TABLET, FILM COATED ORAL at 09:19

## 2022-01-01 RX ADMIN — SEVELAMER CARBONATE 2400 MG: 800 TABLET, FILM COATED ORAL at 16:00

## 2022-01-01 RX ADMIN — SODIUM CHLORIDE, PRESERVATIVE FREE 10 ML: 5 INJECTION INTRAVENOUS at 13:49

## 2022-01-01 RX ADMIN — SODIUM CHLORIDE, PRESERVATIVE FREE 10 ML: 5 INJECTION INTRAVENOUS at 13:42

## 2022-01-01 RX ADMIN — SODIUM CHLORIDE, PRESERVATIVE FREE 10 ML: 5 INJECTION INTRAVENOUS at 23:24

## 2022-01-01 RX ADMIN — PANTOPRAZOLE SODIUM 40 MG: 40 TABLET, DELAYED RELEASE ORAL at 09:49

## 2022-01-01 RX ADMIN — SUCRALFATE 1 G: 1 TABLET ORAL at 09:50

## 2022-01-01 RX ADMIN — SODIUM CHLORIDE 250 ML: 9 INJECTION, SOLUTION INTRAVENOUS at 15:15

## 2022-01-01 RX ADMIN — SODIUM CHLORIDE, PRESERVATIVE FREE 10 ML: 5 INJECTION INTRAVENOUS at 14:08

## 2022-01-01 RX ADMIN — PANTOPRAZOLE SODIUM 40 MG: 40 TABLET, DELAYED RELEASE ORAL at 09:40

## 2022-01-01 RX ADMIN — SODIUM CHLORIDE, PRESERVATIVE FREE 10 ML: 5 INJECTION INTRAVENOUS at 22:53

## 2022-01-01 RX ADMIN — SODIUM CHLORIDE, PRESERVATIVE FREE 10 ML: 5 INJECTION INTRAVENOUS at 22:09

## 2022-01-01 RX ADMIN — HEPARIN SODIUM 5000 UNITS: 5000 INJECTION INTRAVENOUS; SUBCUTANEOUS at 06:30

## 2022-01-01 RX ADMIN — GABAPENTIN 100 MG: 100 CAPSULE ORAL at 21:39

## 2022-01-01 RX ADMIN — SODIUM POLYSTYRENE SULFONATE 30 G: 15 SUSPENSION ORAL; RECTAL at 15:16

## 2022-01-01 RX ADMIN — HEPARIN SODIUM 5000 UNITS: 5000 INJECTION INTRAVENOUS; SUBCUTANEOUS at 05:00

## 2022-01-01 RX ADMIN — ALBUMIN (HUMAN) 12.5 G: 0.25 INJECTION, SOLUTION INTRAVENOUS at 09:32

## 2022-01-10 NOTE — ED TRIAGE NOTES
Pt has not been to dialysis over  aweek dut to feeling \"bad\". Pt states his dialysis center states he needs to bee seen by a physician for lab work before he can go tomorrow for treatment. Pt states he goes to [facility] in [facility]

## 2022-01-11 NOTE — ED PROVIDER NOTES
EMERGENCY DEPARTMENT HISTORY AND PHYSICAL EXAM      Date: 1/10/2022  Patient Name: Dami Salazar    History of Presenting Illness     Chief Complaint   Patient presents with    Labs       History Provided By: Patient    HPI: Dami Salazar, 35 y.o. male with a past medical history significant hypertension and renal insufficiency presents to the ED with cc of not having dialysis treatment for more than a week, patient complaining of fatigue no chest pain no shortness of breath, patient stated he tried to go for dialysis tomorrow but they want him to come to the ED to have his blood work checked    There are no other complaints, changes, or physical findings at this time. PCP: None    No current facility-administered medications on file prior to encounter. Current Outpatient Medications on File Prior to Encounter   Medication Sig Dispense Refill    sucralfate (Carafate) 100 mg/mL suspension Take  by mouth.  sevelamer (RENAGEL) 800 mg tablet Take 800 mg by mouth three (3) times daily (with meals).  doxazosin (CARDURA) 4 mg tablet Take 4 mg by mouth nightly.  metoprolol tartrate (LOPRESSOR) 50 mg tablet Take  by mouth two (2) times a day.  hydrALAZINE (APRESOLINE) 100 mg tablet Take 100 mg by mouth three (3) times daily.  verapamiL (CALAN) 120 mg tablet Take 120 mg by mouth two (2) times a day.  irbesartan (AVAPRO) 300 mg tablet Take 300 mg by mouth daily.  gabapentin (NEURONTIN) 100 mg capsule Take 200 mg by mouth nightly.  pantoprazole (PROTONIX) 20 mg tablet Take 40 mg by mouth daily.          Past History     Past Medical History:  Past Medical History:   Diagnosis Date    Cancer ([facility] [address]     esophageal (squamous cell carcinoma)    Chronic gastritis     Chronic kidney disease     dialysis tue, thur, sat    Dialysis patient ([facility] [address] [facility]     GERD (gastroesophageal reflux disease)     Hypertension     Kidney damage        Past Surgical History:  Past Surgical History: Procedure Laterality Date    HX GI  07/23/2021    EGD    HX OTHER SURGICAL      kidney removed       Family History:  Family History   Problem Relation Age of Onset    Hypertension Mother     Hypertension Father        Social History:  Social History     Tobacco Use    Smoking status: Current Every Day Smoker     Packs/day: 0.33     Years: 10.00     Pack years: 3.30    Smokeless tobacco: Never Used   Substance Use Topics    Alcohol use: Yes    Drug use: No       Allergies:  No Known Allergies      Review of Systems     Review of Systems   Constitutional: Positive for fatigue. Negative for chills and fever. HENT: Negative for rhinorrhea and sore throat. Eyes: Negative for pain and visual disturbance. Respiratory: Negative for cough and shortness of breath. Cardiovascular: Negative for chest pain and leg swelling. Gastrointestinal: Negative for abdominal pain and vomiting. Endocrine: Negative for polydipsia and polyuria. Genitourinary: Negative for dysuria and hematuria. Musculoskeletal: Negative for back pain and neck pain. Skin: Negative for color change and pallor. Neurological: Negative for weakness and headaches. Psychiatric/Behavioral: Negative for agitation and suicidal ideas. Physical Exam     Physical Exam  Vitals and nursing note reviewed. Constitutional:       General: He is not in acute distress. Appearance: He is not ill-appearing, toxic-appearing or diaphoretic. HENT:      Head: Normocephalic and atraumatic. Comments: Generalized facial edema with periorbital soft tissue swelling nontender no erythema     Right Ear: Tympanic membrane normal.      Left Ear: Tympanic membrane normal.      Nose: Nose normal. No congestion. Mouth/Throat:      Mouth: Mucous membranes are moist.      Pharynx: Oropharynx is clear. Uvula midline. No pharyngeal swelling or uvula swelling. Eyes:      Extraocular Movements: Extraocular movements intact. Conjunctiva/sclera: Conjunctivae normal.      Pupils: Pupils are equal, round, and reactive to light. Cardiovascular:      Rate and Rhythm: Normal rate and regular rhythm. Pulses: Normal pulses. Heart sounds: Normal heart sounds. Pulmonary:      Effort: Pulmonary effort is normal. No tachypnea or respiratory distress. Breath sounds: Normal breath sounds. No wheezing, rhonchi or rales. Abdominal:      General: Bowel sounds are normal.      Palpations: Abdomen is soft. Tenderness: There is no abdominal tenderness. Musculoskeletal:         General: No tenderness, deformity or signs of injury. Normal range of motion. Cervical back: Normal range of motion and neck supple. No rigidity or tenderness. Lymphadenopathy:      Cervical: No cervical adenopathy. Skin:     General: Skin is warm and dry. Capillary Refill: Capillary refill takes less than 2 seconds. Findings: No rash. Neurological:      General: No focal deficit present. Mental Status: He is alert and oriented to person, place, and time. Cranial Nerves: No cranial nerve deficit. Sensory: No sensory deficit. Psychiatric:         Mood and Affect: Mood normal.         Behavior: Behavior normal.         Lab and Diagnostic Study Results     Labs -   No results found for this or any previous visit (from the past 12 hour(s)). Radiologic Studies -   @lastxrresult@  CT Results  (Last 48 hours)    None        CXR Results  (Last 48 hours)    None            Medical Decision Making   - I am the first provider for this patient. - I reviewed the vital signs, available nursing notes, past medical history, past surgical history, family history and social history. - Initial assessment performed. The patients presenting problems have been discussed, and they are in agreement with the care plan formulated and outlined with them.   I have encouraged them to ask questions as they arise throughout their visit.    Vital Signs-Reviewed the patient's vital signs. Patient Vitals for the past 12 hrs:   Temp Pulse Resp BP SpO2   01/10/22 2119 97.7 °F (36.5 °C) 69 15 123/86 98 %       Records Reviewed: Nursing Notes    The patient presents with fatigue with a differential diagnosis of anemia, uremia, hypovolemia      ED Course:     ED Course as of 01/11/22 0905   Mon Raymundo 10, 2022   1825 As per nurses note, patient denies any chest pain denies any shortness of breath [SB]   1951 Nephrology consult requested [SB]   1957 Potassium(!): 5.9 [SB]   1957 BUN(!): 93 [SB]   1957 Creatinine(!): 12.81  Case discussed with Dr. Madelaine Krueger and he requested a rapid COVID before dialysis tomorrow [SB]      ED Course User Index  [SB] Vincent Pleitez MD       Provider Notes (Medical Decision Making): MDM       Procedures   Medical Decision Makingedical Decision Making  Performed by: Joseph Agrawal MD  PROCEDURES:  Procedures       Disposition   Disposition: Condition stable  DC- Adult Discharges: All of the diagnostic tests were reviewed and questions answered. Diagnosis, care plan and treatment options were discussed. The patient understands the instructions and will follow up as directed. The patients results have been reviewed with them. They have been counseled regarding their diagnosis. The patient verbally convey understanding and agreement of the signs, symptoms, diagnosis, treatment and prognosis and additionally agrees to follow up as recommended with their PCP in 24 - 48 hours. They also agree with the care-plan and convey that all of their questions have been answered. I have also put together some discharge instructions for them that include: 1) educational information regarding their diagnosis, 2) how to care for their diagnosis at home, as well a 3) list of reasons why they would want to return to the ED prior to their follow-up appointment, should their condition change.     Discharged    DISCHARGE PLAN:  1. Cannot display discharge medications since this patient is not currently admitted. 2.   Follow-up Information     Follow up With Specialties Details Why Contact Info    Dear Bulmaro Suarez dialysis clinic tomorrow  In 1 day          3. Return to ED if worse   4. Discharge Medication List as of 1/10/2022  9:10 PM            Diagnosis     Clinical Impression:   1. Dependent on hemodialysis [facility])        Attestations:    Reina Farah MD    Please note that this dictation was completed with Spark, the computer voice recognition software. Quite often unanticipated grammatical, syntax, homophones, and other interpretive errors are inadvertently transcribed by the computer software. Please disregard these errors. Please excuse any errors that have escaped final proofreading. Thank you.

## 2022-01-11 NOTE — ED PROVIDER NOTES
HPI     Past Medical History:   Diagnosis Date    Cancer ([facility]     esophageal (squamous cell carcinoma)    Chronic gastritis     Chronic kidney disease     dialysis tue, thur, sat    Dialysis patient ([facility]     GERD (gastroesophageal reflux disease)     Hypertension     Kidney damage            Family History:   Problem Relation Age of Onset    Hypertension Mother     Hypertension Father        Social History     Socioeconomic History    Marital status: SINGLE     Spouse name: Not on file    Number of children: Not on file    Years of education: Not on file    Highest education level: Not on file   Occupational History    Not on file   Tobacco Use    Smoking status: Current Every Day Smoker     Packs/day: 0.33     Years: 10.00     Pack years: 3.30    Smokeless tobacco: Never Used   Substance and Sexual Activity    Alcohol use: Yes    Drug use: No    Sexual activity: Not on file   Other Topics Concern    Not on file   Social History Narrative    Not on file     Social Determinants of Health     Financial Resource Strain:     Difficulty of Paying Living Expenses: Not on file   Food Insecurity:     Worried About Running Out of Food in the Last Year: Not on file    Christina of Food in the Last Year: Not on file   Transportation Needs:     Lack of Transportation (Medical): Not on file    Lack of Transportation (Non-Medical):  Not on file   Physical Activity:     Days of Exercise per Week: Not on file    Minutes of Exercise per Session: Not on file   Stress:     Feeling of Stress : Not on file   Social Connections:     Frequency of Communication with Friends and Family: Not on file    Frequency of Social Gatherings with Friends and Family: Not on file    Attends [employer] Services: Not on file    Active Member of Clubs or Organizations: Not on file    Attends Club or Organization Meetings: Not on file    Marital Status: Not on file   Intimate Partner Violence:     Fear of Current or Ex-Partner: Not on file    Emotionally Abused: Not on file    Physically Abused: Not on file    Sexually Abused: Not on file   Housing Stability:     Unable to Pay for Housing in the Last Year: Not on file    Number of Places Lived in the Last Year: Not on file    Unstable Housing in the Last Year: Not on file         ALLERGIES: Patient has no known allergies.     Review of Systems    Vitals:    01/10/22 1735   BP: (!) 129/94   Pulse: 72   Resp: 16   Temp: 97.1 °F (36.2 °C)   SpO2: 97%   Weight: 55.3 kg (122 lb)   Height: 5' 5\" (1.651 m)            Physical Exam     [facility]  ED Course as of 01/10/22 2103   Mon Raymundo 10, 2022   1825 As per nurses note, patient denies any chest pain denies any shortness of breath [SB]   1951 Nephrology consult requested [SB]   1957 Potassium(!): 5.9 [SB]   1957 BUN(!): 93 [SB]   1957 Creatinine(!): 12.81  Case discussed with Dr. Madelaine Krueger and he requested a rapid COVID before dialysis tomorrow [SB]      ED Course User Index  [SB] Vincent Pleitez MD       Procedures

## 2022-01-20 NOTE — ED PROVIDER NOTES
HPI   Patient reports history of severe anemia requiring transfusions. This is likely combination of anemia of chronic disease and end-stage renal.  Patient reports that he was told at dialysis last week that his hemoglobin was 5 and needed to be transfused. He has not completed dialysis then and has not seen anyone until today for this. He reports feeling generally weak and dyspneic on exertion and feels quite pale. He denies any GI bleeding, injury or trauma, abdominal pain or focal weakness. Past Medical History:   Diagnosis Date    Cancer ([facility]     esophageal (squamous cell carcinoma)    Chronic gastritis     Chronic kidney disease     dialysis [place], thur, sat    Dialysis patient ([facility] [address] [facility]     GERD (gastroesophageal reflux disease)     Hypertension     Kidney damage        Past Surgical History:   Procedure Laterality Date    HX GI  07/23/2021    EGD    HX OTHER SURGICAL      kidney removed         Family History:   Problem Relation Age of Onset    Hypertension Mother     Hypertension Father        Social History     Socioeconomic History    Marital status: SINGLE     Spouse name: Not on file    Number of children: Not on file    Years of education: Not on file    Highest education level: Not on file   Occupational History    Not on file   Tobacco Use    Smoking status: Current Every Day Smoker     Packs/day: 0.33     Years: 10.00     Pack years: 3.30    Smokeless tobacco: Never Used   Substance and Sexual Activity    Alcohol use: Yes    Drug use: No    Sexual activity: Not on file   Other Topics Concern    Not on file   Social History Narrative    Not on file     Social Determinants of Health     Financial Resource Strain:     Difficulty of Paying Living Expenses: Not on file   Food Insecurity:     Worried About Running Out of Food in the Last Year: Not on file    Christina of Food in the Last Year: Not on file   Transportation Needs:     Lack of Transportation (Medical):  Not on file  Lack of Transportation (Non-Medical): Not on file   Physical Activity:     Days of Exercise per Week: Not on file    Minutes of Exercise per Session: Not on file   Stress:     Feeling of Stress : Not on file   Social Connections:     Frequency of Communication with Friends and Family: Not on file    Frequency of Social Gatherings with Friends and Family: Not on file    Attends [employer] Services: Not on file    Active Member of [address] [place] [address] or Organizations: Not on file    Attends Club or Organization Meetings: Not on file    Marital Status: Not on file   Intimate Partner Violence:     Fear of Current or Ex-Partner: Not on file    Emotionally Abused: Not on file    Physically Abused: Not on file    Sexually Abused: Not on file   Housing Stability:     Unable to Pay for Housing in the Last Year: Not on file    Number of [place] in the Last Year: Not on file    Unstable Housing in the Last Year: Not on file         ALLERGIES: Patient has no known allergies. Review of Systems   Constitutional: Positive for fatigue. HENT: Negative. Eyes: Negative. Respiratory: Negative. Cardiovascular: Negative. Gastrointestinal: Negative. Endocrine: Negative. Genitourinary: Negative. Musculoskeletal: Negative. Allergic/Immunologic: Negative. Neurological: Negative. Hematological: Negative. Psychiatric/Behavioral: Negative. Vitals:    01/20/22 1518   BP: (!) 165/113   Pulse: 92   Resp: 18   Temp: 97.6 °F (36.4 °C)   SpO2: 100%   Weight: 54.4 kg (120 lb)   Height: 5' 5\" (1.651 m)            Physical Exam  Vitals and nursing note reviewed. Constitutional:       General: He is not in acute distress. Appearance: Normal appearance. He is normal weight. He is not ill-appearing, toxic-appearing or diaphoretic. HENT:      Head: Normocephalic and atraumatic. Nose: Nose normal.      Mouth/Throat:      Mouth: Mucous membranes are moist.      Pharynx: Oropharynx is clear. Eyes:      Extraocular Movements: Extraocular movements intact. Pupils: Pupils are equal, round, and reactive to light. Comments: Conjunctiva are very pale bilaterally. Cardiovascular:      Rate and Rhythm: Normal rate and regular rhythm. Pulses: Normal pulses. Heart sounds: Normal heart sounds. No murmur heard. No friction rub. No gallop. Pulmonary:      Effort: Pulmonary effort is normal. No respiratory distress. Breath sounds: Normal breath sounds. No stridor. No wheezing, rhonchi or rales. Chest:      Chest wall: No tenderness. Musculoskeletal:         General: No swelling, tenderness, deformity or signs of injury. Normal range of motion. Cervical back: Normal range of motion and neck supple. No rigidity. Right lower leg: No edema. Left lower leg: No edema. Lymphadenopathy:      Cervical: No cervical adenopathy. Skin:     General: Skin is warm and dry. Capillary Refill: Capillary refill takes less than 2 seconds. Coloration: Skin is pale. Skin is not jaundiced. Findings: No bruising, erythema, lesion or rash. Neurological:      General: No focal deficit present. Mental Status: He is alert and oriented to person, place, and time. Mental status is at baseline. Cranial Nerves: No cranial nerve deficit. Sensory: No sensory deficit. Motor: No weakness. Coordination: Coordination normal.      Gait: Gait normal.   Psychiatric:         Mood and Affect: Mood normal.         Behavior: Behavior normal.          MDM     Patient is very pale and I have no doubt that his hemoglobin is markedly low. Will recheck and likely admit for transfusion.   Procedures

## 2022-01-20 NOTE — Clinical Note
Patient Class[de-identified] OBSERVATION [104]   Type of Bed: Medical [8]   Cardiac Monitoring Required?: No   Reason for Observation: symptomatic anemia   Admitting Diagnosis: Symptomatic anemia [[id number]]   Admitting Physician: Prachi Ewing   Attending Physician: Maile Smith [[id number]]

## 2022-01-20 NOTE — ED TRIAGE NOTES
Pt is a dialysis patient--went to today and was told his Hgb was 5 \"something. \" Pt reports someSob--100% on RA on arrival.

## 2022-01-21 NOTE — ROUTINE PROCESS
Pt elects to leave AMA. Risks thoroughly explained and patient has had opportunity to ask questions. Pt expresses that he understands risks and has signed AMA form. Dr. Mary Epperson is aware and has spoken with pt. as well. Pt is to follow up with same day surgery at [facility] for transfusion on Tuesday 1/25 as arranged by Dr. Chris Mai. Pt given address and phone number and advised to call on Monday for time.

## 2022-01-21 NOTE — DISCHARGE SUMMARY
Physician Discharge Summary       Patient: Eulogio Pizarro MRN: [id number]     YOB: 1980  Age: 35 y.o. Sex: male    PCP: None    Allergies: Patient has no known allergies. Admit date: 1/20/2022  Admitting Provider: Sim Mckeon MD    Discharge date: 1/20/2022  Discharging Provider: Sim Mckeon MD    * Admission Diagnoses:   Symptomatic anemia [D64.9]      * Discharge Diagnoses:    Hospital Problems as of 1/20/2022 Date Reviewed: 10/18/2021          Codes Class Noted - Resolved POA    * (Principal) Symptomatic anemia ICD-10-CM: D64.9  ICD-9-CM: 285.9  2/5/2021 - Present Unknown                * Hospital Course:   Eulogio Pizarro is a 35 y.o. male followed by None and  has a past medical history of Cancer ([facility] [address] Chronic gastritis, Chronic kidney disease, Dialysis patient ([facility] [address] GERD (gastroesophageal reflux disease), Hypertension, and Kidney damage. Patient is noncompliant with his dialysis and most recently found to have anemia and was told to get transfusion. Initially nephrology had set up outpatient dialysis currently scheduled for next Tuesday at [place] surgery in [place] but today comes in with shortness of breath and noted to have hemoglobin was 5. He says that he is generally feeling weak and shortness of breath with exertion. Patient initially was referred for observation for blood transfusion and case was discussed by ER physician with nephrologist and patient can resume normal slightly scheduled dialysis but patient is very noncompliant and has missed multiple sessions. Upon my evaluation patient frustrated that blood was not ready yet and wanted to sign out [address] and informed patient about outpatient transfusion that set up for next Tuesday and encouraged to continue on scheduled dialysis sessions. So patient after being informed of risks of signing out [address] especially with low hemoglobin continue to want to be discharged home. Patient signed out against medical before he could be evaluated by me      * Procedures:   * No surgery found *        Consults:   Nephrology    Vitals Last 24 Hours:  Patient Vitals for the past 24 hrs:   Temp Pulse Resp BP SpO2   01/20/22 2029 97.4 °F (36.3 °C) 92 18 (!) 158/102 98 %   01/20/22 1518 97.6 °F (36.4 °C) 92 18 (!) 165/113 100 %        Discharge Exam:  Was not able to evaluate the patient prior to signing out [facility]:  Recent Results (from the past 24 hour(s))   CBC WITH AUTOMATED DIFF    Collection Time: 01/20/22  4:50 PM   Result Value Ref Range    WBC 10.5 4.4 - 11.3 K/uL    RBC 1.87 (L) 4.50 - 5.90 M/uL    HGB 6.2 (L) 13.5 - 17.5 g/dL    HCT 18.1 (L) 41 - 53 %    MCV 96.5 80 - 100 FL    MCH 33.1 31 - 34 PG    MCHC 34.2 31.0 - 36.0 g/dL    RDW 16.6 (H) 11.5 - 14.5 %    PLATELET 141 460 - 771 K/uL    MPV 8.0 6.5 - 11.5 FL    NRBC 0.0  WBC    ABSOLUTE NRBC 0.00 K/uL    NEUTROPHILS 91 (H) 42 - 75 %    LYMPHOCYTES 6 (L) 20.5 - 51.1 %    MONOCYTES 3 1.7 - 9.3 %    EOSINOPHILS 0 (L) 0.9 - 2.9 %    BASOPHILS 0 0.0 - 2.5 %    ABS. NEUTROPHILS 9.5 (H) 1.8 - 7.7 K/UL    ABS. LYMPHOCYTES 0.6 (L) 1.0 - 4.8 K/UL    ABS. MONOCYTES 0.3 0.2 - 2.4 K/UL    ABS. EOSINOPHILS 0.0 0.0 - 0.7 K/UL    ABS. BASOPHILS 0.0 0.0 - 0.2 K/UL   METABOLIC PANEL, COMPREHENSIVE    Collection Time: 01/20/22  4:50 PM   Result Value Ref Range    Sodium 136 136 - 145 mmol/L    Potassium 4.8 3.5 - 5.1 mmol/L    Chloride 100 97 - 108 mmol/L    CO2 27 21 - 32 mmol/L    Anion gap 9 5 - 15 mmol/L    Glucose 107 (H) 65 - 100 mg/dL    BUN 77 (H) 6 - 20 mg/dL    Creatinine 11.15 (H) 0.70 - 1.30 mg/dL    BUN/Creatinine ratio 7 (L) 12 - 20      GFR est AA 6 (L) >60 ml/min/1.73m2    GFR est non-AA 5 (L) >60 ml/min/1.73m2    Calcium 9.4 8.5 - 10.1 mg/dL    Bilirubin, total 0.2 0.2 - 1.0 mg/dL    AST (SGOT) 10 (L) 15 - 37 U/L    ALT (SGPT) 7 (L) 12 - 78 U/L    Alk.  phosphatase 49 45 - 117 U/L    Protein, total 4.9 (L) 6.4 - 8.2 g/dL    Albumin 2.0 (L) 3.5 - 5.0 g/dL    Globulin 2.9 2.0 - 4.0 g/dL    A-G Ratio 0.7 (L) 1.1 - 2.2     TYPE & SCREEN    Collection Time: 01/20/22  5:56 PM   Result Value Ref Range    Crossmatch Expiration 01/23/2022,2359     ABO/Rh(D) O Positive     Antibody screen Negative     Unit number [id number]     Blood component type [facility]     Unit division 00     Status of unit [place] to transfuse     Crossmatch result Compatible     Unit number [id number]     Blood component type  LR     Unit division 00     Status of unit [place] to transfuse     Crossmatch result Compatible          Imaging:  No results found. * Discharge Condition: fair  * Disposition: Home w/Family      Discharge Medications:  Current Discharge Medication List            * Follow-up Care/Patient Instructions:   Activity: Activity as tolerated  Diet: Renal Diet      Follow-up Information    None         Signed:  Aria Alicea MD  1/20/2022  9:34 PM

## 2022-01-21 NOTE — H&P
History and Physical      Chief Complaints:     Chief Complaint   Patient presents with    Abnormal Lab Results         Subjective:     Suzie Childers is a 35 y.o. male followed by None and  has a past medical history of Cancer ([address] Chronic gastritis, Chronic kidney disease, Dialysis patient ([address] GERD (gastroesophageal reflux disease), Hypertension, and Kidney damage. Patient is noncompliant with his dialysis and most recently found to have anemia and was told to get transfusion. Initially nephrology had set up outpatient dialysis currently scheduled for next Tuesday at same-day surgery in [facility] but today comes in with shortness of breath and noted to have hemoglobin was 5. He says that he is generally feeling weak and shortness of breath with exertion. Patient initially was referred for observation for blood transfusion and case was discussed by ER physician with nephrologist and patient can resume normal slightly scheduled dialysis but patient is very noncompliant and has missed multiple sessions. Upon my evaluation patient frustrated that blood was not ready yet and wanted to sign out [address] and informed patient about outpatient transfusion that set up for next Tuesday and encouraged to continue on scheduled dialysis sessions. So patient after being informed of risks of signing out [address] especially with low hemoglobin continue to want to be discharged home.   Patient signed out against medical before he could be evaluated by me      Past Medical History:   Diagnosis Date    Cancer ([address]     esophageal (squamous cell carcinoma)    Chronic gastritis     Chronic kidney disease     dialysis tue, thur, sat    Dialysis patient ([address]     GERD (gastroesophageal reflux disease)     Hypertension     Kidney damage       Past Surgical History:   Procedure Laterality Date    HX GI  07/23/2021    EGD    HX OTHER SURGICAL      kidney removed     Family History Problem Relation Age of Onset    Hypertension Mother     Hypertension Father       Social History     Tobacco Use    Smoking status: Current Every Day Smoker     Packs/day: 0.33     Years: 10.00     Pack years: 3.30    Smokeless tobacco: Never Used   Substance Use Topics    Alcohol use: Yes       Prior to Admission medications    Medication Sig Start Date End Date Taking? Authorizing Provider   sucralfate (Carafate) 100 mg/mL suspension Take  by mouth. Provider, Historical   sevelamer (RENAGEL) 800 mg tablet Take 800 mg by mouth three (3) times daily (with meals). Provider, Historical   doxazosin (CARDURA) 4 mg tablet Take 4 mg by mouth nightly. Provider, Historical   metoprolol tartrate (LOPRESSOR) 50 mg tablet Take  by mouth two (2) times a day. Provider, Historical   hydrALAZINE (APRESOLINE) 100 mg tablet Take 100 mg by mouth three (3) times daily. Provider, Historical   verapamiL (CALAN) 120 mg tablet Take 120 mg by mouth two (2) times a day. Provider, Historical   irbesartan (AVAPRO) 300 mg tablet Take 300 mg by mouth daily. Provider, Historical   gabapentin (NEURONTIN) 100 mg capsule Take 200 mg by mouth nightly. Provider, Historical   pantoprazole (PROTONIX) 20 mg tablet Take 40 mg by mouth daily. Other, MD Bonny     No Known Allergies     Review of Systems:  Review of Systems   Constitutional: Positive for fatigue. Negative for chills, diaphoresis and fever. HENT: Negative for congestion, ear pain, postnasal drip, sinus pain and sore throat. Eyes: Negative for pain, discharge and redness. Respiratory: Positive for shortness of breath. Negative for cough and wheezing. Cardiovascular: Negative for chest pain and palpitations. Gastrointestinal: Negative for abdominal pain, constipation, diarrhea, nausea and vomiting. Genitourinary: Negative for dysuria, flank pain, frequency and urgency. Musculoskeletal: Negative for arthralgias and myalgias.    Neurological: Negative for dizziness, weakness and headaches. Psychiatric/Behavioral: Negative for agitation and hallucinations. The patient is not nervous/anxious. Objective:     Vitals:  Visit Vitals  BP (!) 158/102   Pulse 92   Temp 97.4 °F (36.3 °C)   Resp 18   Ht 5' 5\" (1.651 m)   Wt 54.4 kg (120 lb)   SpO2 98%   BMI 19.97 kg/m²       Physical Exam:  Patient signed out against medical vice before can do physical exam    Labs:  Recent Results (from the past 24 hour(s))   CBC WITH AUTOMATED DIFF    Collection Time: 01/20/22  4:50 PM   Result Value Ref Range    WBC 10.5 4.4 - 11.3 K/uL    RBC 1.87 (L) 4.50 - 5.90 M/uL    HGB 6.2 (L) 13.5 - 17.5 g/dL    HCT 18.1 (L) 41 - 53 %    MCV 96.5 80 - 100 FL    MCH 33.1 31 - 34 PG    MCHC 34.2 31.0 - 36.0 g/dL    RDW 16.6 (H) 11.5 - 14.5 %    PLATELET 374 016 - 918 K/uL    MPV 8.0 6.5 - 11.5 FL    NRBC 0.0  WBC    ABSOLUTE NRBC 0.00 K/uL    NEUTROPHILS 91 (H) 42 - 75 %    LYMPHOCYTES 6 (L) 20.5 - 51.1 %    MONOCYTES 3 1.7 - 9.3 %    EOSINOPHILS 0 (L) 0.9 - 2.9 %    BASOPHILS 0 0.0 - 2.5 %    ABS. NEUTROPHILS 9.5 (H) 1.8 - 7.7 K/UL    ABS. LYMPHOCYTES 0.6 (L) 1.0 - 4.8 K/UL    ABS. MONOCYTES 0.3 0.2 - 2.4 K/UL    ABS. EOSINOPHILS 0.0 0.0 - 0.7 K/UL    ABS. BASOPHILS 0.0 0.0 - 0.2 K/UL   METABOLIC PANEL, COMPREHENSIVE    Collection Time: 01/20/22  4:50 PM   Result Value Ref Range    Sodium 136 136 - 145 mmol/L    Potassium 4.8 3.5 - 5.1 mmol/L    Chloride 100 97 - 108 mmol/L    CO2 27 21 - 32 mmol/L    Anion gap 9 5 - 15 mmol/L    Glucose 107 (H) 65 - 100 mg/dL    BUN 77 (H) 6 - 20 mg/dL    Creatinine 11.15 (H) 0.70 - 1.30 mg/dL    BUN/Creatinine ratio 7 (L) 12 - 20      GFR est AA 6 (L) >60 ml/min/1.73m2    GFR est non-AA 5 (L) >60 ml/min/1.73m2    Calcium 9.4 8.5 - 10.1 mg/dL    Bilirubin, total 0.2 0.2 - 1.0 mg/dL    AST (SGOT) 10 (L) 15 - 37 U/L    ALT (SGPT) 7 (L) 12 - 78 U/L    Alk.  phosphatase 49 45 - 117 U/L    Protein, total 4.9 (L) 6.4 - 8.2 g/dL    Albumin 2.0 (L) 3.5 - 5.0 g/dL    Globulin 2.9 2.0 - 4.0 g/dL    A-G Ratio 0.7 (L) 1.1 - 2.2     TYPE & SCREEN    Collection Time: 01/20/22  5:56 PM   Result Value Ref Range    Crossmatch Expiration 01/23/2022,2359     ABO/Rh(D) O Positive     Antibody screen Negative     Unit number [id number]     Blood component type [facility]     Unit division 00     [facility] transf[facility]     Crossmatch result Compatible     Unit number [id number]     Blood component type [facility]     Unit division 00     [facility]     Crossmatch result Compatible        Imaging:  No results found. Patient signed out [address] and was given instruction about scheduled transfusion on Tuesday 1/25 at same-day [facility]    Spent 30 minutes minutes evaluting and coordinating patient care of which >50% was spent coordinating and counseling.             Electronically signed by Raina Camarillo MD on 1/20/2022 at 9:24 PM

## 2022-01-24 PROBLEM — N18.6 ESRD (END STAGE RENAL DISEASE) (HCC): Status: ACTIVE | Noted: 2022-01-01

## 2022-01-24 NOTE — ED NOTES
Pt first PRBC infusion complete pt tolerated without issue pt up at [facility] at this time

## 2022-01-24 NOTE — ED TRIAGE NOTES
Patient brought in from home for generalized weakness, reports that he has not had dialysis in 1 wk. Reports that he cannot do dialysis due to his HGB being low.

## 2022-01-24 NOTE — ED PROVIDER NOTES
EMERGENCY DEPARTMENT HISTORY AND PHYSICAL EXAM      Date: 1/24/2022  Patient Name: Jaz Sarah    History of Presenting Illness     Chief Complaint   Patient presents with    Fatigue       History Provided By: Patient    HPI: Jaz Sarah, 35 y.o. male with a past medical history significant renal insufficiency and malignancy presents to the ED with cc of increased weakness patient feels due to his low blood count and has not had his hemodialysis in a week or more    There are no other complaints, changes, or physical findings at this time. PCP: None    No current facility-administered medications on file prior to encounter. Current Outpatient Medications on File Prior to Encounter   Medication Sig Dispense Refill    sucralfate (Carafate) 100 mg/mL suspension Take  by mouth.  sevelamer (RENAGEL) 800 mg tablet Take 800 mg by mouth three (3) times daily (with meals).  doxazosin (CARDURA) 4 mg tablet Take 4 mg by mouth nightly.  metoprolol tartrate (LOPRESSOR) 50 mg tablet Take  by mouth two (2) times a day.  hydrALAZINE (APRESOLINE) 100 mg tablet Take 100 mg by mouth three (3) times daily.  verapamiL (CALAN) 120 mg tablet Take 120 mg by mouth two (2) times a day.  irbesartan (AVAPRO) 300 mg tablet Take 300 mg by mouth daily.  gabapentin (NEURONTIN) 100 mg capsule Take 200 mg by mouth nightly.  pantoprazole (PROTONIX) 20 mg tablet Take 40 mg by mouth daily.          Past History     Past Medical History:  Past Medical History:   Diagnosis Date    [address]     esophageal (squamous cell carcinoma)    Chronic gastritis     Chronic kidney disease     dialysis tue, thur, sat    Dialysis [facility] ([facility]     GERD (gastroesophageal reflux disease)     Hypertension     Kidney damage        Past Surgical History:  Past Surgical History:   Procedure Laterality Date    HX GI  07/23/2021    EGD    HX OTHER SURGICAL      kidney removed       Family History:  Family History   Problem Relation Age of Onset    Hypertension Mother     Hypertension Father        Social History:  Social History     Tobacco Use    Smoking status: Current Every Day Smoker     Packs/day: 0.33     Years: 10.00     Pack years: 3.30    Smokeless tobacco: Never Used   Substance Use Topics    Alcohol use: Yes    Drug use: No       Allergies:  No Known Allergies      Review of Systems     Review of Systems   Constitutional: Positive for fatigue. Negative for chills and fever. HENT: Negative for rhinorrhea and sore throat. Eyes: Negative for pain and visual disturbance. Respiratory: Negative for cough and shortness of breath. Cardiovascular: Negative for chest pain and leg swelling. Gastrointestinal: Negative for abdominal pain and vomiting. Endocrine: Negative for polydipsia and polyuria. Genitourinary: Negative for dysuria and hematuria. Musculoskeletal: Negative for back pain and neck pain. Skin: Negative for color change and pallor. Neurological: Positive for dizziness and weakness. Negative for headaches. Psychiatric/Behavioral: Negative for agitation and suicidal ideas. Physical Exam     Physical Exam  Vitals and nursing note reviewed. Constitutional:       General: He is not in acute distress. Appearance: He is ill-appearing. He is not toxic-appearing or diaphoretic. HENT:      Head: Normocephalic and atraumatic. Right Ear: Tympanic membrane normal.      Left Ear: Tympanic membrane normal.      Nose: Nose normal. No congestion. Mouth/Throat:      Mouth: Mucous membranes are moist.      Pharynx: Oropharynx is clear. Eyes:      General: Vision grossly intact. Extraocular Movements: Extraocular movements intact. Conjunctiva/sclera: Conjunctivae normal.      Pupils: Pupils are equal, round, and reactive to light. Cardiovascular:      Rate and Rhythm: Normal rate and regular rhythm. Pulses: Normal pulses.       Heart sounds: Normal heart sounds. Pulmonary:      Effort: Pulmonary effort is normal.      Breath sounds: Normal breath sounds. Abdominal:      General: Bowel sounds are normal.      Palpations: Abdomen is soft. Tenderness: There is no abdominal tenderness. Musculoskeletal:         General: No tenderness, deformity or signs of injury. Normal range of motion. Cervical back: Normal range of motion and neck supple. No rigidity or tenderness. Lymphadenopathy:      Cervical: No cervical adenopathy. Skin:     General: Skin is warm and dry. Capillary Refill: Capillary refill takes more than 3 seconds. Coloration: Skin is pale. Findings: No rash. Neurological:      General: No focal deficit present. Mental Status: He is alert and oriented to person, place, and time. Cranial Nerves: No cranial nerve deficit. Sensory: No sensory deficit. Psychiatric:         Mood and Affect: Mood normal.         Behavior: Behavior normal.         Lab and Diagnostic Study Results     Labs -   No results found for this or any previous visit (from the past 12 hour(s)). Radiologic Studies -   @lastxrresult@  CT Results  (Last 48 hours)    None        CXR Results  (Last 48 hours)    None            Medical Decision Making   - I am the first provider for this patient. - I reviewed the vital signs, available nursing notes, past medical history, past surgical history, family history and social history. - Initial assessment performed. The patients presenting problems have been discussed, and they are in agreement with the care plan formulated and outlined with them. I have encouraged them to ask questions as they arise throughout their visit. Vital Signs-Reviewed the patient's vital signs. No data found.     Records Reviewed: Nursing Notes    The patient presents with dizziness with a differential diagnosis of  dizziness/vertigo, generalized weakness and GI bleed/hypovolemia      ED Course:     ED Course as of 01/24/22 1351   Mon Jan 24, 2022   1053 Patient states that he is unable to get his dialysis due to his anemia, patient states he normally has dialysis Tuesday Thursday Saturday and his last dialysis session was more than a week ago possibly 10 days ago, patient complaining of increased weakness tiredness [SB]   1220 Lab called to inform me that patient has further different types of antibodies and his blood request with has to be sent to [facility] for them to do the crossmatch and for blood to be sent back to us at best patient would not be able to receive transfusion not till later tonight; [SB]   1221 Consults placed to Dr. Tiago Bear nephrology and I will discuss the case with the hospitalist [SB]   [phone] Case discussed with Dr. Tiago Bear and he sees no contraindication for patient being dialyzed therefore patient does not have to wait for blood transfusion for dialysis [SB]      ED Course User Index  [SB] Ashley Veliz MD       Provider Notes (Medical Decision Making): MDM       Procedures   Medical Decision Makingedical Decision Making  Performed by: Juan Garcia MD  PROCEDURES:  Procedures       Disposition   Disposition: Condition stable  Admitted to Floor Medical Floor the case was discussed with the admitting physician Wale        DISCHARGE PLAN:  1. Current Discharge Medication List      CONTINUE these medications which have NOT CHANGED    Details   sucralfate (Carafate) 100 mg/mL suspension Take  by mouth.      sevelamer (RENAGEL) 800 mg tablet Take 800 mg by mouth three (3) times daily (with meals). doxazosin (CARDURA) 4 mg tablet Take 4 mg by mouth nightly. metoprolol tartrate (LOPRESSOR) 50 mg tablet Take  by mouth two (2) times a day. hydrALAZINE (APRESOLINE) 100 mg tablet Take 100 mg by mouth three (3) times daily. verapamiL (CALAN) 120 mg tablet Take 120 mg by mouth two (2) times a day.       irbesartan (AVAPRO) 300 mg tablet Take 300 mg by mouth daily.      gabapentin (NEURONTIN) 100 mg capsule Take 200 mg by mouth nightly. pantoprazole (PROTONIX) 20 mg tablet Take 40 mg by mouth daily. 2.   Follow-up Information    None       3. Return to ED if worse   4. Current Discharge Medication List            Diagnosis     Clinical Impression: No diagnosis found. Attestations:    Reina Farah MD    Please note that this dictation was completed with [employer], the computer voice recognition software. Quite often unanticipated grammatical, syntax, homophones, and other interpretive errors are inadvertently transcribed by the computer software. Please disregard these errors. Please excuse any errors that have escaped final proofreading. Thank you.

## 2022-01-24 NOTE — PROGRESS NOTES
Short on call nephrology note  Unable to dialysis pt today due to no available HD nurses at present  Pt will need to be admitted for HD on 1/25 AM  C/w kayexelate 30g PO bid and hyperk+ cocktail management while waiting for HD  Transfuse as needed.    If pt clinical condition deteriorates ~ consider transfer to Any tertiary center     Signed By: Bonnie Kraft MD     January 24, 2022

## 2022-01-25 NOTE — ROUTINE PROCESS
Contacted pharmacy to inquire on Verapamil order. Med not available. Pharmacist will need to contact MD in the morning for substitution.

## 2022-01-25 NOTE — PROGRESS NOTES
HOSPITALIST PROGRESS NOTE  Alka Dhillon MD, [address]         Daily Progress Note: 1/25/2022  COVID-19 positive  Appropriate PPE donned and off. Subjective:     Patient is alert and oriented x4 sitting at bedside. Patient's face and eyes are extremely swollen due to not having gotten dialysis over the last 1 week. No overnight fever/chills, hypoxia, shortness of breath or chest pain noted although patient is COVID-19 positive.     Current POC reviewed including dialysis pending today with 1 unit PRBC transfusion additional.      Medications reviewed  Current Facility-Administered Medications   Medication Dose Route Frequency    doxazosin (CARDURA) tablet 4 mg  4 mg Oral QHS    0.9% sodium chloride infusion 250 mL  250 mL IntraVENous PRN    sodium polystyrene (KAYEXALATE) 15 gram/60 mL oral suspension 30 g  30 g Oral BID    epoetin anastasiia-epbx (RETACRIT) injection 10,000 Units  10,000 Units IntraVENous Q TUE, THU & SAT    0.9% sodium chloride infusion 250 mL  250 mL IntraVENous PRN    0.9% sodium chloride infusion 250 mL  250 mL IntraVENous PRN    sodium chloride (NS) flush 5-40 mL  5-40 mL IntraVENous Q8H    sodium chloride (NS) flush 5-40 mL  5-40 mL IntraVENous PRN    acetaminophen (TYLENOL) tablet 650 mg  650 mg Oral Q6H PRN    Or    acetaminophen (TYLENOL) suppository 650 mg  650 mg Rectal Q6H PRN    polyethylene glycol (MIRALAX) packet 17 g  17 g Oral DAILY PRN    ondansetron (ZOFRAN ODT) tablet 4 mg  4 mg Oral Q8H PRN    Or    ondansetron (ZOFRAN) injection 4 mg  4 mg IntraVENous Q6H PRN    heparin (porcine) injection 5,000 Units  5,000 Units SubCUTAneous Q8H    hydrALAZINE (APRESOLINE) tablet 100 mg  100 mg Oral TID    gabapentin (NEURONTIN) capsule 200 mg  200 mg Oral QHS    verapamiL (CALAN) tablet 120 mg  120 mg Oral BID    sucralfate (CARAFATE) tablet 1 g  1 g Oral ACB&D    sevelamer carbonate (RENVELA) tab 800 mg  800 mg Oral TID WITH MEALS    metoprolol tartrate (LOPRESSOR) tablet 50 mg  50 mg Oral BID    losartan (COZAAR) tablet 100 mg  100 mg Oral DAILY    pantoprazole (PROTONIX) tablet 40 mg  40 mg Oral DAILY       Review of Systems:   A comprehensive review of systems was negative except for that written in the HPI. Objective:   Physical Exam:     Visit Vitals  /85   Pulse 80   Temp 97.2 °F (36.2 °C)   Resp 20   Ht 5' 5\" (1.651 m)   Wt 61.2 kg (134 lb 14.4 oz)   SpO2 99%   BMI 22.45 kg/m²      O2 Device: None (Room air)  Patient Vitals for the past 8 hrs:   Temp Pulse Resp BP SpO2   22 1600 97.2 °F (36.2 °C) 80 20 118/85    22 1550 98 °F (36.7 °C) 80 20 (!) 140/100 99 %   22 1530  80 20 (!) 140/110 100 %   22 1524 97.4 °F (36.3 °C) 77 20 (!) 150/106 100 %   22 1200  80      22 1139 (!) 95.9 °F (35.5 °C) 81 20 (!) 142/108 95 %          Temp (24hrs), Av °F (36.7 °C), Min:95.9 °F (35.5 °C), Max:98.9 °F (37.2 °C)    701 - 1900  In: 480 [P.O.:480]  Out:  -  07  In: 325   Out: -     General:  Alert, cooperative, no distress, appears stated age. Lungs:    Diminished breath sounds throughout and mild bibasilar rales without any rhonchi noted. Chest wall:  No tenderness or deformity. Heart:  Regular rate and rhythm, S1, S2 normal, no murmur, click, rub or gallop. Abdomen:   Soft, non-tender. Bowel sounds normal. No masses,  No organomegaly. Extremities: Extremities normal, atraumatic, no cyanosis or edema. Pulses: 2+ and symmetric all extremities.    Skin: Skin color, texture, turgor normal. No rashes or lesions   Neurologic: No gross sensory or motor deficits     Data Review:       Recent Days:  Recent Labs     22  1445 22  1100   WBC 5.3 7.1   HGB 6.9* 5.9*   HCT 19.6* 17.2*    291     Recent Labs     22  1445 22  0613 22  1100   *  --  133* K 5.0  --  6.1*   CL 96*  --  97   CO2 18*  --  22   *  --  104*   *  --  138*   CREA 15.32*  --  14.62*   CA 8.0*  --  8.5   PHOS  --  10.6*  --    ALB  --   --  1.8*   TBILI  --   --  0.3   ALT  --   --  6*   INR  --   --  1.1     No results for input(s): PH, PCO2, PO2, HCO3, FIO2 in the last 72 hours. 24 Hour Results:  Recent Results (from the past 24 hour(s))   PHOSPHORUS    Collection Time: 01/25/22  6:13 AM   Result Value Ref Range    Phosphorus 10.6 (H) 2.6 - 4.7 mg/dL   CBC WITH AUTOMATED DIFF    Collection Time: 01/25/22  2:45 PM   Result Value Ref Range    WBC 5.3 4.4 - 11.3 K/uL    RBC 2.17 (L) 4.50 - 5.90 M/uL    HGB 6.9 (L) 13.5 - 17.5 g/dL    HCT 19.6 (L) 41 - 53 %    MCV 90.4 80 - 100 FL    MCH 31.7 31 - 34 PG    MCHC 35.0 31.0 - 36.0 g/dL    RDW 18.6 (H) 11.5 - 14.5 %    PLATELET 897 761 - 962 K/uL    MPV 7.7 6.5 - 11.5 FL    NRBC 0.2  WBC    ABSOLUTE NRBC 0.01 K/uL    NEUTROPHILS 88 (H) 42 - 75 %    LYMPHOCYTES 8 (L) 20.5 - 51.1 %    MONOCYTES 4 1.7 - 9.3 %    EOSINOPHILS 0 (L) 0.9 - 2.9 %    BASOPHILS 0 0.0 - 2.5 %    ABS. NEUTROPHILS 4.7 1.8 - 7.7 K/UL    ABS. LYMPHOCYTES 0.4 (L) 1.0 - 4.8 K/UL    ABS. MONOCYTES 0.2 0.2 - 2.4 K/UL    ABS. EOSINOPHILS 0.0 0.0 - 0.7 K/UL    ABS. BASOPHILS 0.0 0.0 - 0.2 K/UL   METABOLIC PANEL, BASIC    Collection Time: 01/25/22  2:45 PM   Result Value Ref Range    Sodium 133 (L) 136 - 145 mmol/L    Potassium 5.0 3.5 - 5.1 mmol/L    Chloride 96 (L) 97 - 108 mmol/L    CO2 18 (L) 21 - 32 mmol/L    Anion gap 19 (H) 5 - 15 mmol/L    Glucose 102 (H) 65 - 100 mg/dL     (H) 6 - 20 mg/dL    Creatinine 15.32 (H) 0.70 - 1.30 mg/dL    BUN/Creatinine ratio 11 (L) 12 - 20      GFR est AA 4 (L) >60 ml/min/1.73m2    GFR est non-AA 3 (L) >60 ml/min/1.73m2    Calcium 8.0 (L) 8.5 - 10.1 mg/dL           Assessment/Plan:     COVID-19 positive  Currently not symptomatic and not requiring any supplemental oxygen, shortness of breath or fevers.   Patient only received the first dose of his Pfizer vaccine and awaiting second dose    Symptomatic anemia  Secondary to chronic kidney disease  Hemoglobin 5.9 and will be typed and crossed for 2 units and will transfuse  Repeat hemoglobin of 6.9 this a.m. and will transfuse an additional 1 unit PRBC with dialysis     End-stage renal disease on hemodialysis  Has missed over a week of hemodialysis showing evidence of fluid overload and elevated BUN/creatinine and mild hyperkalemia  Nephrology consulted and will receive dialysis this a.m. Found to be positive for COVID-19 and will need to have temporary dialysis center  Cont' home medications     Hypertensive urgency  Continue hydralazine, metoprolol and verapamil     Hyperkalemia  Mild at 6.1 and gave 1 dose of Kayexalate  Pending dialysis.     GERD  Continue pantoprazole and sulcal fate therapy     DVT prophylaxis  Heparin     CODE STATUS: Full code      Care Plan discussed with: Patient/Family, Nurse and     Total time spent with patient: 35 minutes. With greater than 50% spent in coordination of care and counseling.     Addison Stokes MD

## 2022-01-25 NOTE — PROGRESS NOTES
Patients case reviewed during interdisciplinary team meeting in [address] [place], [place] [address]Acute Care Unit. Rev.  Flora Woo 46, [address] No

## 2022-01-25 NOTE — PROGRESS NOTES
Problem: Airway Clearance - Ineffective  Goal: Achieve or maintain patent airway  Outcome: Progressing Towards Goal     Problem: Gas Exchange - Impaired  Goal: Absence of hypoxia  Outcome: Progressing Towards Goal     Problem: Gas Exchange - Impaired  Goal: Promote optimal lung function  Outcome: Progressing Towards Goal     Problem: Breathing Pattern - Ineffective  Goal: Ability to achieve and maintain a regular respiratory rate  Outcome: Progressing Towards Goal     Problem: Body Temperature -  Risk of, Imbalanced  Goal: Will regain or maintain usual level of consciousness  Outcome: Progressing Towards Goal     Problem:  Body Temperature -  Risk of, Imbalanced  Goal: Complications related to the disease process, condition or treatment will be avoided or minimized  Outcome: Progressing Towards Goal

## 2022-01-25 NOTE — PROCEDURES
[facility]  [phone]    Vitals   Pre   Post   Assessment   Pre   Post     Temp  Temp: 97.4 °F (36.3 °C) (01/25/22 1524)  97.4 LOC  Periorbital facial and generalized Periorbital facial    HR   Pulse (Heart Rate): 77 (01/25/22 1524) 78 Lungs   Diminished pulse ox 100 on room air  unlabored, even at rest   B/P   BP: (!) 150/106 (01/25/22 1524) 114/77 Cardiac   Regular S1 S2 monitored remotely  monitored remotely    Resp   Resp Rate: 20 (01/25/22 1524) 20 Skin   Warm and dry   warm and dry    Pain level  Pain Intensity 1: 0 (01/25/22 1139) No verbal complaints at this time Edema    generalized   generalized   Orders:    Duration:   Start:    1524 End:    1855 Total:   3.5   Dialyzer:   Dialyzer/Set Up Inspection: Revaclear (01/25/22 1524)   K Bath:   Dialysate K (mEq/L): 2 (01/25/22 1524)   Ca Bath:   Dialysate CA (mEq/L): 2.5 (01/25/22 1524)   Na/Bicarb:   Dialysate NA (mEq/L): 140 (01/25/22 1524)   Target Fluid Removal:   Goal/Amount of Fluid to Remove (mL): 3000 mL (01/25/22 1524)   Access     Type & Location:   Left lower arm AV graft, + thrill/bruit, no redness or drainage, prepped per policy and procedure cannulated with two 15 gauge needles, secured with tape, labs drawn and flusahed   Labs     Obtained/Reviewed   Critical Results Called   Date when labs were drawn-  Hgb-    HGB   Date Value Ref Range Status   01/25/2022 6.9 (L) 13.5 - 17.5 g/dL Final     Comment:     Results verified, phoned to and read back by EPIFANIO CAGLE/NANCY @5272 53/15/62 GKB Investigated per delta check protocol     K-    Potassium   Date Value Ref Range Status   01/25/2022 5.0 3.5 - 5.1 mmol/L Final     Ca-   Calcium   Date Value Ref Range Status   01/25/2022 8.0 (L) 8.5 - 10.1 mg/dL Final     Bun-   BUN   Date Value Ref Range Status   01/25/2022 161 (H) 6 - 20 mg/dL Final     Comment:     CALLED TO AND READ BACK BY Manford Lat LPN @ 4058 BY RHONDA Gonzalez-   Creatinine   Date Value Ref Range Status 01/25/2022 15.32 (H) 0.70 - 1.30 mg/dL Final     Comment:     CALLED TO AND READ BACK BY Opal Mcgowan LPN @ 1908 BY M        Medications/ Blood Products Given     Name   Dose   Route and Time     PRBC  ONE UNIT IV PUMP 1550   RetacrIT 69419 IV PUSH 1530        Blood Volume Processed (BVP):    78 Net Fluid   Removed:  2000   Comments   Time Out Done: 1431  Primary Nurse Rpt Pre: Berta Obando LPN  Primary Nurse Rpt Post: Berta Obando LPN  Pt Education: blood transfusion during dialysis   Care Plan:  Continue current HD plan of care   Tx Summary:  1524 HD initiated as planned. 1550 one unit PRBC checked and initiated  1615 decreased UF for range in  Order and trending blood pressure  1800 assisted patient on bedpan for liquid brown stool small amount  1855 treatment completed one unit prbc transfused no signs or sx of transfusion reaction, all possible blood returned, hemostasis achieved, dressing clean, dry and intact + thrill. All dialysis related medications have been reviewed. Admiting Diagnosis: hyperkalemia/anemia  Pt's previous [facility]- [facility]  Consent signed - Informed Consent Verified: Yes (verbal) (01/25/22 1524)   Hepatitis Status- immune physician portal, antigen pending connect care   Machine #- Machine Number: [id number] (01/25/22 1524)  Telemetry status- monitored remotely  Pre-dialysis wt. -

## 2022-01-25 NOTE — ED NOTES
Report to Harsh Pedroza RN. She is aware that patient did not take kayexalate at 315 pm and was given at [address] Dr Gypsy Tinoco also aware, ok to have patient receive only the one dose.

## 2022-01-25 NOTE — H&P
History and Physical      Chief Complaints:     Chief Complaint   Patient presents with    Fatigue         Subjective:     Curly Villarreal is a 35 y.o. male followed by None and  has a past medical history of Cancer ([facility] [address] Chronic gastritis, Chronic kidney disease, Dialysis patient ([facility] [address] GERD (gastroesophageal reflux disease), Hypertension, and Kidney damage. Presents with increasing shortness of breath and weakness. Patient was here on the 19th and was was again 2 units of blood patient ended up signing out [address] Patient still has not had dialysis in over a week does have noted swelling around the eyelids and lower extremities good O2 saturation on room. Hemoglobin still low at 5.9 and denies any sick contacts noted on testing was found to be COVID-19 positive. She also has labs that show potassium 6.1 BUN of 138 creatinine 14.62. Patient was referred for admission for symptomatic anemia end-stage renal disease and COVID-19      Past Medical History:   Diagnosis Date    Cancer ([facility] [address]     esophageal (squamous cell carcinoma)    Chronic gastritis     Chronic kidney disease     dialysis tue, thur, sat    Dialysis patient ([facility] [address]     GERD (gastroesophageal reflux disease)     Hypertension     Kidney damage       Past Surgical History:   Procedure Laterality Date    HX GI  07/23/2021    EGD    HX OTHER SURGICAL      kidney removed     Family History   Problem Relation Age of Onset    Hypertension Mother     Hypertension Father       Social History     Tobacco Use    Smoking status: Current Every Day Smoker     Packs/day: 0.33     Years: 10.00     Pack years: 3.30    Smokeless tobacco: Never Used   Substance Use Topics    Alcohol use: Yes       Prior to Admission medications    Medication Sig Start Date End Date Taking? Authorizing Provider   sucralfate (Carafate) 100 mg/mL suspension Take  by mouth.     Provider, Historical   sevelamer (RENAGEL) 800 mg tablet Take 800 mg by mouth three (3) times daily (with meals). Provider, Historical   doxazosin (CARDURA) 4 mg tablet Take 4 mg by mouth nightly. Provider, Historical   metoprolol tartrate (LOPRESSOR) 50 mg tablet Take  by mouth two (2) times a day. Provider, Historical   hydrALAZINE (APRESOLINE) 100 mg tablet Take 100 mg by mouth three (3) times daily. Provider, Historical   verapamiL (CALAN) 120 mg tablet Take 120 mg by mouth two (2) times a day. Provider, Historical   irbesartan (AVAPRO) 300 mg tablet Take 300 mg by mouth daily. Provider, Historical   gabapentin (NEURONTIN) 100 mg capsule Take 200 mg by mouth nightly. Provider, Historical   pantoprazole (PROTONIX) 20 mg tablet Take 40 mg by mouth daily. Other, MD Bonny     No Known Allergies     Review of Systems:  Review of Systems   Constitutional: Negative for chills, diaphoresis, fatigue and fever. HENT: Negative for congestion, ear pain, postnasal drip, sinus pain and sore throat. Eyes: Negative for pain, discharge and redness. Respiratory: Positive for shortness of breath. Negative for cough and wheezing. Cardiovascular: Positive for leg swelling. Negative for chest pain and palpitations. Gastrointestinal: Negative for abdominal pain, constipation, diarrhea, nausea and vomiting. Genitourinary: Negative for dysuria, flank pain, frequency and urgency. Musculoskeletal: Negative for arthralgias and myalgias. Neurological: Negative for dizziness, weakness and headaches. Psychiatric/Behavioral: Negative for agitation and hallucinations. The patient is not nervous/anxious. Objective:     Vitals:  Visit Vitals  BP (!) 172/121   Pulse 94   Temp 98.8 °F (37.1 °C)   Resp 20   SpO2 99%       Physical Exam:  General: Alert, cooperative, no distress. Head:  Normocephalic, without obvious abnormality, atraumatic. Eyes:  Conjunctivae/corneas clear. Pupils equal, round, reactive to light. Extraocular movements intact.   Lungs:  Clear to auscultation bilaterally, no wheezes, crackles  Chest wall: No tenderness or deformity. Heart:  Regular rate and rhythm, S1, S2 normal, no murmur, click, rub, or gallop. Abdomen:   Soft, non-tender. Bowel sounds normal. No masses. No organomegaly. Back:  No spine tenderness to palpation  Extremities: Extremities normal, atraumatic, no cyanosis or edema. Pulses: Symmetric all extremities. Skin: Skin color, texture, turgor normal.   Lymph nodes: Cervical nodes normal.  Neurologic: CNII-XII intact. Normal strength, sensation, and reflexes throughout. Labs:  Recent Results (from the past 24 hour(s))   CBC WITH AUTOMATED DIFF    Collection Time: 01/24/22 11:00 AM   Result Value Ref Range    WBC 7.1 4.4 - 11.3 K/uL    RBC 1.79 (L) 4.50 - 5.90 M/uL    HGB 5.9 (LL) 13.5 - 17.5 g/dL    HCT 17.2 (LL) 41 - 53 %    MCV 96.1 80 - 100 FL    MCH 33.1 31 - 34 PG    MCHC 34.4 31.0 - 36.0 g/dL    RDW 16.4 (H) 11.5 - 14.5 %    PLATELET 907 565 - 281 K/uL    MPV 8.2 6.5 - 11.5 FL    NRBC 0.1  WBC    ABSOLUTE NRBC 0.01 K/uL    NEUTROPHILS 91 (H) 42 - 75 %    LYMPHOCYTES 6 (L) 20.5 - 51.1 %    MONOCYTES 3 1.7 - 9.3 %    EOSINOPHILS 0 (L) 0.9 - 2.9 %    BASOPHILS 0 0.0 - 2.5 %    ABS. NEUTROPHILS 6.5 1.8 - 7.7 K/UL    ABS. LYMPHOCYTES 0.4 (L) 1.0 - 4.8 K/UL    ABS. MONOCYTES 0.2 0.2 - 2.4 K/UL    ABS. EOSINOPHILS 0.0 0.0 - 0.7 K/UL    ABS.  BASOPHILS 0.0 0.0 - 0.2 K/UL   PROTHROMBIN TIME + INR    Collection Time: 01/24/22 11:00 AM   Result Value Ref Range    Prothrombin time 11.1 9.0 - 11.1 sec    INR 1.1 0.9 - 1.1     METABOLIC PANEL, COMPREHENSIVE    Collection Time: 01/24/22 11:00 AM   Result Value Ref Range    Sodium 133 (L) 136 - 145 mmol/L    Potassium 6.1 (H) 3.5 - 5.1 mmol/L    Chloride 97 97 - 108 mmol/L    CO2 22 21 - 32 mmol/L    Anion gap 14 5 - 15 mmol/L    Glucose 104 (H) 65 - 100 mg/dL     (H) 6 - 20 mg/dL    Creatinine 14.62 (H) 0.70 - 1.30 mg/dL    BUN/Creatinine ratio 9 (L) 12 - 20      GFR est AA 4 (L) >60 ml/min/1.73m2    GFR est non-AA 4 (L) >60 ml/min/1.73m2    Calcium 8.5 8.5 - 10.1 mg/dL    Bilirubin, total 0.3 0.2 - 1.0 mg/dL    AST (SGOT) 12 (L) 15 - 37 U/L    ALT (SGPT) PENDING U/L    Alk. phosphatase 44 (L) 45 - 117 U/L    Protein, total 4.4 (L) 6.4 - 8.2 g/dL    Albumin 1.8 (L) 3.5 - 5.0 g/dL    Globulin 2.6 2.0 - 4.0 g/dL    A-G Ratio 0.7 (L) 1.1 - 2.2     TYPE & SCREEN    Collection Time: 01/24/22 11:00 AM   Result Value Ref Range    Crossmatch Expiration 01/27/2022,2359     ABO/Rh(D) O Positive     Antibody screen Negative     Unit number [id number]     Blood component type  LR     Unit division 00     Status of unit Issued     [address] to transfuse     Crossmatch result Compatible     Unit number [id number]     Blood component type  LR     Unit division 00     Status of unit [place] to transfuse     Crossmatch result Compatible    COVID-19 WITH INFLUENZA A/B    Collection Time: 01/24/22 12:02 PM   Result Value Ref Range    SARS-CoV-2 DETECTED (A) Not Detected      Influenza A by PCR Not Detected Not Detected      Influenza B by PCR Not Detected Not Detected         Imaging:  No results found. Assessment & Plan:     Symptomatic anemia  -Secondary to chronic kidney disease  -Hemoglobin 5.9 and will be typed and crossed for 2 units and will transfuse  -Follow-up repeat CBC in a.m.     End-stage renal disease on hemodialysis  -Has missed over a week of hemodialysis showing evidence of fluid overload and elevated BUN/creatinine and mild hyperkalemia  -Nephrology consulted and will set up dialysis in a.m.  -Found to be positive for COVID-19 and will need to have temporary dialysis center  -Restart home medications    Hypertensive urgency  -Blood pressure elevated 163/125 has not had any of his home medications and will restart home dose of hydralazine, metoprolol and verapamil  -Monitor vitals every 4 hours    Hyperkalemia  -Mild at 6.1 and gave 1 dose of Kayexalate  -Due for dialysis in a.m.  -Follow repeat labs in a.m.     GERD  -Continue pantoprazole and sulcal fate therapy    DVT prophylaxis  -Heparin    CODE STATUS: Full code  -Patient does not designate anyone to be decision-maker    Spent 45 minutes evaluating cording patient's admission on remote telemetry expect at least 1 to 2 days of acute care stay      Electronically signed by Jean Claude Wakefield MD on 1/24/2022 at 7:52 PM

## 2022-01-25 NOTE — ROUTINE PROCESS
Bedside shift change report given to lakisha reed (oncoming nurse) by Robert Palmer (offgoing nurse). Report included the following information Kardex.

## 2022-01-26 NOTE — PROGRESS NOTES
Problem: Airway Clearance - Ineffective  Goal: Achieve or maintain patent airway  Outcome: Progressing Towards Goal     Problem: Gas Exchange - Impaired  Goal: Absence of hypoxia  Outcome: Progressing Towards Goal     Problem: Gas Exchange - Impaired  Goal: Promote optimal lung function  Outcome: Progressing Towards Goal     Problem: Breathing Pattern - Ineffective  Goal: Ability to achieve and maintain a regular respiratory rate  Outcome: Progressing Towards Goal     Problem: Isolation Precautions - Risk of Spread of Infection  Goal: Prevent transmission of infectious organism to others  Outcome: Progressing Towards Goal     Problem: Nutrition Deficits  Goal: Optimize nutrtional status  Outcome: Progressing Towards Goal     Problem: Risk for Fluid Volume Deficit  Goal: Maintain normal heart rhythm  Outcome: Progressing Towards Goal     Problem: Falls - Risk of  Goal: *Absence of Falls  Description: Document Angelica Fall Risk and appropriate interventions in the flowsheet.   Outcome: Progressing Towards Goal  Note: Fall Risk Interventions:            Medication Interventions: (P) Teach patient to arise slowly         History of Falls Interventions: (P) Investigate reason for fall         Problem: Fatigue  Goal: Verbalize increase energy and improved vitality  Outcome: Progressing Towards Goal

## 2022-01-26 NOTE — CONSULTS
Consult Date: 1/26/2022    Consults    Subjective     34y M w/ pmhx remarkable for HTN, ESRD on HD, and GERD BIBEMS due to acute onset of SOB w/o reports of CP, N/V/D, dizziness, lightheadedness, or LOC. On arrival VSS w/o evidence of hemodynamic instability. Routine labs showed profound anemia w/ hb at 5.9 w/o reports of GIB. Hospital course c/b COVID-19+. Pt received HD on 1/25.  Nephrology consulted in account of ESRD       Past Medical History:   Diagnosis Date    Cancer ([facility]     esophageal (squamous cell carcinoma)    Chronic gastritis     Chronic kidney disease     dialysis skylare, romeour, sat    Dialysis patient ([facility]     GERD (gastroesophageal reflux disease)     Hypertension     Kidney damage       Past Surgical History:   Procedure Laterality Date    HX GI  07/23/2021    EGD    HX OTHER SURGICAL      kidney removed     Family History   Problem Relation Age of Onset    Hypertension Mother     Hypertension Father       Social History     Tobacco Use    Smoking status: Current Every Day Smoker     Packs/day: 0.33     Years: 10.00     Pack years: 3.30    Smokeless tobacco: Never Used   Substance Use Topics    Alcohol use: Yes       Current Facility-Administered Medications   Medication Dose Route Frequency Provider Last Rate Last Admin    doxazosin (CARDURA) tablet 4 mg  4 mg Oral QHS Gunner Echevarria MD   4 mg at 01/25/22 2251    0.9% sodium chloride infusion 250 mL  250 mL IntraVENous PRN Hilary Guo MD 15 mL/hr at 01/24/22 1515 250 mL at 01/24/22 1515    sodium polystyrene (KAYEXALATE) 15 gram/60 mL oral suspension 30 g  30 g Oral BID Richie KLEIN MD   30 g at 01/25/22 0919    epoetin anastasiia-epbx (RETACRIT) injection 10,000 Units  10,000 Units IntraVENous Q [place], THU & SAT Avelino Rome MD   10,000 Units at 01/25/22 1546    0.9% sodium chloride infusion 250 mL  250 mL IntraVENous PRN Gunner Echevarria MD        0.9% sodium chloride infusion 250 mL  250 mL IntraVENous PRN Halle Myers MD        sodium chloride (NS) flush 5-40 mL  5-40 mL IntraVENous Q8H Gunner Echevarria MD   10 mL at 01/26/22 0624    sodium chloride (NS) flush 5-40 mL  5-40 mL IntraVENous PRN Itzel Echevarria MD        acetaminophen (TYLENOL) tablet 650 mg  650 mg Oral Q6H PRN Halle Myers MD   650 mg at 01/26/22 0354    Or    acetaminophen (TYLENOL) suppository 650 mg  650 mg Rectal Q6H PRN Itzel Echevarria MD        polyethylene glycol (MIRALAX) packet 17 g  17 g Oral DAILY PRN Itzel Echevarria MD        ondansetron (ZOFRAN ODT) tablet 4 mg  4 mg Oral Q8H PRN Gunner Echevarria MD   4 mg at 01/24/22 2035    Or    ondansetron (ZOFRAN) injection 4 mg  4 mg IntraVENous Q6H PRN Itzel Echevarria MD        heparin (porcine) injection 5,000 Units  5,000 Units SubCUTAneous Q8H Itzel Echevarria MD   5,000 Units at 01/26/22 9351    hydrALAZINE (APRESOLINE) tablet 100 mg  100 mg Oral TID Codie BAUMAN MD   100 mg at 01/26/22 0940    gabapentin (NEURONTIN) capsule 200 mg  200 mg Oral QHS Gunner Echevarria MD   200 mg at 01/25/22 2248    verapamiL (CALAN) tablet 120 mg  120 mg Oral BID Gunner Echevarria MD   120 mg at 01/26/22 0940    sucralfate (CARAFATE) tablet 1 g  1 g Oral ACB&D Codie BAUMAN MD   1 g at 01/25/22 0630    sevelamer carbonate (RENVELA) tab 800 mg  800 mg Oral TID WITH MEALS Gunner Echevarria MD   800 mg at 01/25/22 1201    metoprolol tartrate (LOPRESSOR) tablet 50 mg  50 mg Oral BID Gunner Echevarria MD   50 mg at 01/26/22 0940    losartan (COZAAR) tablet 100 mg  100 mg Oral DAILY Gunner Echevarria MD   100 mg at 01/26/22 0940    pantoprazole (PROTONIX) tablet 40 mg  40 mg Oral DAILY Halle Myers MD   40 mg at 01/26/22 0940        Review of Systems   Constitutional: Negative. Negative for chills, diaphoresis and fever. Respiratory: Negative for cough, chest tightness and shortness of breath.     Cardiovascular: Negative for chest pain.   Gastrointestinal: Negative for abdominal pain, diarrhea, nausea, rectal pain and vomiting. Genitourinary: Negative for dysuria. Skin: Negative for pallor. Neurological: Negative for light-headedness. Hematological: Negative. Psychiatric/Behavioral: Negative. Objective     Vital signs for last 24 hours:  Visit Vitals  BP (!) 136/102   Pulse 73   Temp 97.6 °F (36.4 °C)   Resp 18   Ht 5' 5\" (1.651 m)   Wt 61.3 kg (135 lb 3.2 oz)   SpO2 96%   BMI 22.50 kg/m²       Intake/Output this shift:  Current Shift: No intake/output data recorded. Last 3 Shifts: 01/24 1901 - 01/26 0700  In: 1136.3 [P.O.:480]  Out: 2001     Data Review:   Recent Results (from the past 24 hour(s))   CBC WITH AUTOMATED DIFF    Collection Time: 01/25/22  2:45 PM   Result Value Ref Range    WBC 5.3 4.4 - 11.3 K/uL    RBC 2.17 (L) 4.50 - 5.90 M/uL    HGB 6.9 (L) 13.5 - 17.5 g/dL    HCT 19.6 (L) 41 - 53 %    MCV 90.4 80 - 100 FL    MCH 31.7 31 - 34 PG    MCHC 35.0 31.0 - 36.0 g/dL    RDW 18.6 (H) 11.5 - 14.5 %    PLATELET 248 076 - 376 K/uL    MPV 7.7 6.5 - 11.5 FL    NRBC 0.2  WBC    ABSOLUTE NRBC 0.01 K/uL    NEUTROPHILS 88 (H) 42 - 75 %    LYMPHOCYTES 8 (L) 20.5 - 51.1 %    MONOCYTES 4 1.7 - 9.3 %    EOSINOPHILS 0 (L) 0.9 - 2.9 %    BASOPHILS 0 0.0 - 2.5 %    ABS. NEUTROPHILS 4.7 1.8 - 7.7 K/UL    ABS. LYMPHOCYTES 0.4 (L) 1.0 - 4.8 K/UL    ABS. MONOCYTES 0.2 0.2 - 2.4 K/UL    ABS. EOSINOPHILS 0.0 0.0 - 0.7 K/UL    ABS.  BASOPHILS 0.0 0.0 - 0.2 K/UL   METABOLIC PANEL, BASIC    Collection Time: 01/25/22  2:45 PM   Result Value Ref Range    Sodium 133 (L) 136 - 145 mmol/L    Potassium 5.0 3.5 - 5.1 mmol/L    Chloride 96 (L) 97 - 108 mmol/L    CO2 18 (L) 21 - 32 mmol/L    Anion gap 19 (H) 5 - 15 mmol/L    Glucose 102 (H) 65 - 100 mg/dL     (H) 6 - 20 mg/dL    Creatinine 15.32 (H) 0.70 - 1.30 mg/dL    BUN/Creatinine ratio 11 (L) 12 - 20      GFR est AA 4 (L) >60 ml/min/1.73m2    GFR est non-AA 3 (L) >60 ml/min/1.73m2 Calcium 8.0 (L) 8.5 - 10.1 mg/dL       Physical Exam  Vitals reviewed. Constitutional:       Appearance: Normal appearance. HENT:      Head: Atraumatic. Mouth/Throat:      Mouth: Mucous membranes are dry. Cardiovascular:      Rate and Rhythm: Normal rate. Pulses: Normal pulses. Heart sounds: No murmur heard. Pulmonary:      Effort: Pulmonary effort is normal. No respiratory distress. Breath sounds: No wheezing. Abdominal:      General: Abdomen is flat. Bowel sounds are normal.      Palpations: Abdomen is soft. Skin:     General: Skin is dry. Capillary Refill: Capillary refill takes less than 2 seconds. Neurological:      General: No focal deficit present. Recent Results (from the past 24 hour(s))   CBC WITH AUTOMATED DIFF    Collection Time: 01/25/22  2:45 PM   Result Value Ref Range    WBC 5.3 4.4 - 11.3 K/uL    RBC 2.17 (L) 4.50 - 5.90 M/uL    HGB 6.9 (L) 13.5 - 17.5 g/dL    HCT 19.6 (L) 41 - 53 %    MCV 90.4 80 - 100 FL    MCH 31.7 31 - 34 PG    MCHC 35.0 31.0 - 36.0 g/dL    RDW 18.6 (H) 11.5 - 14.5 %    PLATELET 642 921 - 194 K/uL    MPV 7.7 6.5 - 11.5 FL    NRBC 0.2  WBC    ABSOLUTE NRBC 0.01 K/uL    NEUTROPHILS 88 (H) 42 - 75 %    LYMPHOCYTES 8 (L) 20.5 - 51.1 %    MONOCYTES 4 1.7 - 9.3 %    EOSINOPHILS 0 (L) 0.9 - 2.9 %    BASOPHILS 0 0.0 - 2.5 %    ABS. NEUTROPHILS 4.7 1.8 - 7.7 K/UL    ABS. LYMPHOCYTES 0.4 (L) 1.0 - 4.8 K/UL    ABS. MONOCYTES 0.2 0.2 - 2.4 K/UL    ABS. EOSINOPHILS 0.0 0.0 - 0.7 K/UL    ABS.  BASOPHILS 0.0 0.0 - 0.2 K/UL   METABOLIC PANEL, BASIC    Collection Time: 01/25/22  2:45 PM   Result Value Ref Range    Sodium 133 (L) 136 - 145 mmol/L    Potassium 5.0 3.5 - 5.1 mmol/L    Chloride 96 (L) 97 - 108 mmol/L    CO2 18 (L) 21 - 32 mmol/L    Anion gap 19 (H) 5 - 15 mmol/L    Glucose 102 (H) 65 - 100 mg/dL     (H) 6 - 20 mg/dL    Creatinine 15.32 (H) 0.70 - 1.30 mg/dL    BUN/Creatinine ratio 11 (L) 12 - 20      GFR est AA 4 (L) >60 ml/min/1.73m2    GFR est non-AA 3 (L) >60 ml/min/1.73m2    Calcium 8.0 (L) 8.5 - 10.1 mg/dL         Current Facility-Administered Medications:     doxazosin (CARDURA) tablet 4 mg, 4 mg, Oral, QHS, Gunner Echevarria MD, 4 mg at 01/25/22 2251    0.9% sodium chloride infusion 250 mL, 250 mL, IntraVENous, PRN, Hilary Guo MD, Last Rate: 15 mL/hr at 01/24/22 1515, 250 mL at 01/24/22 1515    sodium polystyrene (KAYEXALATE) 15 gram/60 mL oral suspension 30 g, 30 g, Oral, BID, Joanne KLEIN MD, 30 g at 01/25/22 0919    epoetin anastasiia-epbx (RETACRIT) injection 10,000 Units, 10,000 Units, IntraVENous, Q TUE, THU & SAT, Rojas Callejas MD, 10,000 Units at 01/25/22 1546    0.9% sodium chloride infusion 250 mL, 250 mL, IntraVENous, PRN, Gunner Echevarria MD    0.9% sodium chloride infusion 250 mL, 250 mL, IntraVENous, PRN, Gunner Echevarria MD    sodium chloride (NS) flush 5-40 mL, 5-40 mL, IntraVENous, Q8H, Gunner Echevarria MD, 10 mL at 01/26/22 0624    sodium chloride (NS) flush 5-40 mL, 5-40 mL, IntraVENous, PRN, Ilene Echevarria MD    acetaminophen (TYLENOL) tablet 650 mg, 650 mg, Oral, Q6H PRN, 650 mg at 01/26/22 0354 **OR** acetaminophen (TYLENOL) suppository 650 mg, 650 mg, Rectal, Q6H PRN, Gunner Echevarria MD    polyethylene glycol (MIRALAX) packet 17 g, 17 g, Oral, DAILY PRN, Gunner Echevarria MD    ondansetron (ZOFRAN ODT) tablet 4 mg, 4 mg, Oral, Q8H PRN, 4 mg at 01/24/22 2035 **OR** ondansetron (ZOFRAN) injection 4 mg, 4 mg, IntraVENous, Q6H PRN, Gunner Echevarria MD    heparin (porcine) injection 5,000 Units, 5,000 Units, SubCUTAneous, Q8H, Gunner Echevarria MD, 5,000 Units at 01/26/22 9468    hydrALAZINE (APRESOLINE) tablet 100 mg, 100 mg, Oral, TID, Gunner Echevarria MD, 100 mg at 01/26/22 0940    gabapentin (NEURONTIN) capsule 200 mg, 200 mg, Oral, QHS, Gunner Echevarria MD, 200 mg at 01/25/22 2248    verapamiL (CALAN) tablet 120 mg, 120 mg, Oral, BID, Damian Garcia MD, 120 mg at 01/26/22 0940    sucralfate (CARAFATE) tablet 1 g, 1 g, Oral, ACB&D, Gunner Echevarria MD, 1 g at 01/25/22 0630    sevelamer carbonate (RENVELA) tab 800 mg, 800 mg, Oral, TID WITH MEALS, Gunner Echevarria MD, 800 mg at 01/25/22 1201    metoprolol tartrate (LOPRESSOR) tablet 50 mg, 50 mg, Oral, BID, Gunner Echevarria MD, 50 mg at 01/26/22 0940    losartan (COZAAR) tablet 100 mg, 100 mg, Oral, DAILY, Gunner Echevarria MD, 100 mg at 01/26/22 0940    pantoprazole (PROTONIX) tablet 40 mg, 40 mg, Oral, DAILY, Gunner Echevarria MD, 40 mg at 01/26/22 0940    Assessment       Plan            1. ESRD on HD - no indications for Additional RRT modalities today. Expect next HD on 1/27  C/w strict I/O  C/w daily Bmp for additional monitoring     2. Hep B serologies non reactive     3. HTN - c/w current management     4. Secondary hyperparathyroidism -c/w renvela 2400mg PO TID      5. Acute on chronic anemia of renal disease- s/p PRBC. C/w cbc trend for additional monitoring  Recommend GI eval for additional management     6. HyperK+ -corrected      7. COVID-19+: on isolation precautions as per primary team     8.  SW/CM for disposition    Signed By: Jadon Grande MD     January 26, 2022

## 2022-01-26 NOTE — PROGRESS NOTES
HOSPITALIST PROGRESS NOTE  Maria G Shabazz MD, [address]         Daily Progress Note: 1/26/2022  COVID-19 positive  Appropriate PPE donned and off. Subjective:     Patient is alert and oriented x4 sitting at bedside. No overnight fever/chills, hypoxia, shortness of breath or chest pain noted although patient is COVID-19 positive. Status post 1 unit of PRBC transfusion. Patient will again receive dialysis on 1/27/2022 with significantly elevated BUN/creatinine of 126/11. Patient likely can be discharged home in a.m. after dialysis.       Medications reviewed  Current Facility-Administered Medications   Medication Dose Route Frequency    sevelamer carbonate (RENVELA) tab 2,400 mg  2,400 mg Oral TID WITH MEALS    gabapentin (NEURONTIN) capsule 100 mg  100 mg Oral QHS    doxazosin (CARDURA) tablet 4 mg  4 mg Oral QHS    0.9% sodium chloride infusion 250 mL  250 mL IntraVENous PRN    epoetin anastasiia-epbx (RETACRIT) injection 10,000 Units  10,000 Units IntraVENous Q TUE, THU & SAT    0.9% sodium chloride infusion 250 mL  250 mL IntraVENous PRN    0.9% sodium chloride infusion 250 mL  250 mL IntraVENous PRN    sodium chloride (NS) flush 5-40 mL  5-40 mL IntraVENous Q8H    sodium chloride (NS) flush 5-40 mL  5-40 mL IntraVENous PRN    acetaminophen (TYLENOL) tablet 650 mg  650 mg Oral Q6H PRN    Or    acetaminophen (TYLENOL) suppository 650 mg  650 mg Rectal Q6H PRN    polyethylene glycol (MIRALAX) packet 17 g  17 g Oral DAILY PRN    ondansetron (ZOFRAN ODT) tablet 4 mg  4 mg Oral Q8H PRN    Or    ondansetron (ZOFRAN) injection 4 mg  4 mg IntraVENous Q6H PRN    heparin (porcine) injection 5,000 Units  5,000 Units SubCUTAneous Q8H    hydrALAZINE (APRESOLINE) tablet 100 mg  100 mg Oral TID    verapamiL (CALAN) tablet 120 mg  120 mg Oral BID    sucralfate (CARAFATE) tablet 1 g  1 g Oral ACB&D    metoprolol tartrate (LOPRESSOR) tablet 50 mg  50 mg Oral BID    losartan (COZAAR) tablet 100 mg  100 mg Oral DAILY    pantoprazole (PROTONIX) tablet 40 mg  40 mg Oral DAILY       Review of Systems:   A comprehensive review of systems was negative except for that written in the HPI. Objective:   Physical Exam:     Visit Vitals  /79   Pulse 77   Temp 98.1 °F (36.7 °C)   Resp 18   Ht 5' 5\" (1.651 m)   Wt 61.3 kg (135 lb 3.2 oz)   SpO2 99%   BMI 22.50 kg/m²      O2 Device: None (Room air)  Patient Vitals for the past 8 hrs:   Temp Pulse Resp BP SpO2   22 1131 98.1 °F (36.7 °C) 77 18 101/79 99 %   22 0739 97.6 °F (36.4 °C) 73 18 (!) 136/102 96 %          Temp (24hrs), Av.9 °F (36.6 °C), Min:97.2 °F (36.2 °C), Max:99.1 °F (37.3 °C)    No intake/output data recorded.  1901 -  0700  In: 1136.3 [P.O.:480]  Out:      General:  Alert, cooperative, no distress, appears stated age. Lungs:    Diminished breath sounds throughout and mild bibasilar rales without any rhonchi noted. Chest wall:  No tenderness or deformity. Heart:  Regular rate and rhythm, S1, S2 normal, no murmur, click, rub or gallop. Abdomen:   Soft, non-tender. Bowel sounds normal. No masses,  No organomegaly. Extremities: Extremities normal, atraumatic, no cyanosis or edema. Pulses: 2+ and symmetric all extremities.    Skin: Skin color, texture, turgor normal. No rashes or lesions   Neurologic: No gross sensory or motor deficits     Data Review:       Recent Days:  Recent Labs     22  1445 22  1100   WBC 5.3 7.1   HGB 6.9* 5.9*   HCT 19.6* 17.2*    291     Recent Labs     22  1006 22  1445 22  0613 22  1100   * 133*  --  133*   K 4.4 5.0  --  6.1*   CL 96* 96*  --  97   CO2 23 18*  --  22   GLU 80 102*  --  104*   * 161*  --  138*   CREA 10.96* 15.32*  --  14.62*   CA 7.5* 8.0*  --  8.5   PHOS  --   --  10.6*  --    ALB  --   --   --  1.8*   TBILI  -- --   --  0.3   ALT  --   --   --  6*   INR  --   --   --  1.1     No results for input(s): PH, PCO2, PO2, HCO3, FIO2 in the last 72 hours. 24 Hour Results:  Recent Results (from the past 24 hour(s))   METABOLIC PANEL, BASIC    Collection Time: 01/26/22 10:06 AM   Result Value Ref Range    Sodium 132 (L) 136 - 145 mmol/L    Potassium 4.4 3.5 - 5.1 mmol/L    Chloride 96 (L) 97 - 108 mmol/L    CO2 23 21 - 32 mmol/L    Anion gap 13 5 - 15 mmol/L    Glucose 80 65 - 100 mg/dL     (H) 6 - 20 mg/dL    Creatinine 10.96 (H) 0.70 - 1.30 mg/dL    BUN/Creatinine ratio 11 (L) 12 - 20      GFR est AA 6 (L) >60 ml/min/1.73m2    GFR est non-AA 5 (L) >60 ml/min/1.73m2    Calcium 7.5 (L) 8.5 - 10.1 mg/dL           Assessment/Plan:     COVID-19 positive  Currently not symptomatic and not requiring any supplemental oxygen, shortness of breath or fevers. Patient only received the first dose of his Pfizer vaccine and awaiting second dose     Symptomatic anemia  Secondary to chronic kidney disease  Hemoglobin 5.9 and will be typed and crossed for 2 units and will transfuse  Repeat hemoglobin of 6.9. and transfused an additional 1 unit PRBC with dialysis.     End-stage renal disease on hemodialysis  Has missed over a week of hemodialysis showing evidence of fluid overload and elevated BUN/creatinine and mild hyperkalemia  Spoke to nephrology, Dr. Jose Almanzar today who states that patient will receive dialysis on 01/27/2022. Found to be positive for COVID-19 and will need to have temporary dialysis center  Cont' home medications      Hypertensive urgency  Continue hydralazine, metoprolol and verapamil     Hyperkalemia  Mild at 6.1 and gave 1 dose of Kayexalate  Pending dialysis.     GERD  Continue pantoprazole and sulcal fate therapy     DVT prophylaxis  Heparin     CODE STATUS: Full code      Care Plan discussed with: Patient/Family, Nurse and . Total time spent with patient: 35 minutes.     With greater than 50% spent in coordination of care and counseling.     Christiano Akers MD

## 2022-01-26 NOTE — PROGRESS NOTES
Patients case reviewed during interdisciplinary team meeting in [address] [place] [address]/Acute Care Unit. Rev.  Flora Manzanares 22, [address]

## 2022-01-27 NOTE — ROUTINE PROCESS
Bedside shift change report given to lakisha reed (oncoming nurse) by Yohan May (offgoing nurse). Report included the following information Kardex.

## 2022-01-27 NOTE — PROGRESS NOTES
Progress Note    Patient: Shane Brannon MRN: [id number]  SSN: [id number]    YOB: 1980  Age: 35 y.o. Sex: male      Admit Date: 1/24/2022    LOS: 3 days     Subjective:     Patient presents with anemia. Has frequent anemia, previously thought to be due to end-stage renal disease. However patient on further questioning, reports some dark stools. Blood pressure has been low this a.m. Objective:     Vitals:    01/27/22 1115 01/27/22 1130 01/27/22 1145 01/27/22 1206   BP: (!) 88/65 (!) 87/67 (!) 88/69 (!) 88/65   Pulse: 72 71 72 72   Resp: 18 18 18 18   Temp:    97.2 °F (36.2 °C)   TempSrc:       SpO2:    100%   Weight:       Height:            Intake and Output:  Current Shift: 01/27 0701 - 01/27 1900  In: 300 [P.O.:240; I.V.:50]  Out: 1   Last three shifts: No intake/output data recorded. Physical Exam:   GENERAL: alert,   THROAT & NECK: normal and no erythema or exudates noted. LUNG: clear to auscultation bilaterally  HEART: regular rate and rhythm, S1, S2 normal, no murmur, click, rub or gallop  ABDOMEN: soft, non-tender. Bowel sounds normal. No masses,  no organomegaly  EXTREMITIES:  extremities normal, atraumatic, no cyanosis or edema  SKIN: no rash or abnormalities  NEUROLOGIC: AOx3. PSYCHIATRIC: non focal    Lab/Data Review: All lab results for the last 24 hours reviewed.      Recent Results (from the past 24 hour(s))   CBC WITH AUTOMATED DIFF    Collection Time: 01/26/22  3:42 PM   Result Value Ref Range    WBC 4.7 4.4 - 11.3 K/uL    RBC 2.66 (L) 4.50 - 5.90 M/uL    HGB 8.3 (L) 13.5 - 17.5 g/dL    HCT 24.3 (L) 41 - 53 %    MCV 91.5 80 - 100 FL    MCH 31.3 31 - 34 PG    MCHC 34.2 31.0 - 36.0 g/dL    RDW 17.6 (H) 11.5 - 14.5 %    PLATELET 939 777 - 907 K/uL    MPV 8.7 6.5 - 11.5 FL    NRBC 0.4  WBC    ABSOLUTE NRBC 0.02 K/uL    NEUTROPHILS 84 (H) 42 - 75 %    LYMPHOCYTES 11 (L) 20.5 - 51.1 %    MONOCYTES 5 1.7 - 9.3 %    EOSINOPHILS 0 (L) 0.9 - 2.9 %    BASOPHILS 0 0.0 - 2.5 % ABS. NEUTROPHILS 3.9 1.8 - 7.7 K/UL    ABS. LYMPHOCYTES 0.5 (L) 1.0 - 4.8 K/UL    ABS. MONOCYTES 0.2 0.2 - 2.4 K/UL    ABS. EOSINOPHILS 0.0 0.0 - 0.7 K/UL    ABS. BASOPHILS 0.0 0.0 - 0.2 K/UL   METABOLIC PANEL, BASIC    Collection Time: 01/27/22  5:34 AM   Result Value Ref Range    Sodium 132 (L) 136 - 145 mmol/L    Potassium 4.4 3.5 - 5.1 mmol/L    Chloride 95 (L) 97 - 108 mmol/L    CO2 20 (L) 21 - 32 mmol/L    Anion gap 17 (H) 5 - 15 mmol/L    Glucose 81 65 - 100 mg/dL     (H) 6 - 20 mg/dL    Creatinine 11.54 (H) 0.70 - 1.30 mg/dL    BUN/Creatinine ratio 14 12 - 20      GFR est AA 6 (L) >60 ml/min/1.73m2    GFR est non-AA 5 (L) >60 ml/min/1.73m2    Calcium 7.3 (L) 8.5 - 10.1 mg/dL   CBC WITH AUTOMATED DIFF    Collection Time: 01/27/22  5:34 AM   Result Value Ref Range    WBC 3.7 (L) 4.4 - 11.3 K/uL    RBC 2.37 (L) 4.50 - 5.90 M/uL    HGB 7.5 (L) 13.5 - 17.5 g/dL    HCT 21.7 (L) 41 - 53 %    MCV 91.6 80 - 100 FL    MCH 31.5 31 - 34 PG    MCHC 34.4 31.0 - 36.0 g/dL    RDW 17.6 (H) 11.5 - 14.5 %    PLATELET 498 (L) 713 - 400 K/uL    MPV 9.1 6.5 - 11.5 FL    NRBC 0.2  WBC    ABSOLUTE NRBC 0.01 K/uL    NEUTROPHILS 86 (H) 42 - 75 %    LYMPHOCYTES 9 (L) 20.5 - 51.1 %    MONOCYTES 5 1.7 - 9.3 %    EOSINOPHILS 0 (L) 0.9 - 2.9 %    BASOPHILS 0 0.0 - 2.5 %    ABS. NEUTROPHILS 3.2 1.8 - 7.7 K/UL    ABS. LYMPHOCYTES 0.3 (L) 1.0 - 4.8 K/UL    ABS. MONOCYTES 0.2 0.2 - 2.4 K/UL    ABS. EOSINOPHILS 0.0 0.0 - 0.7 K/UL    ABS. BASOPHILS 0.0 0.0 - 0.2 K/UL        Imaging:    No results found.        Assessment and Plan:     Acute on chronic anemia  -Patient with chronic anemia due to CKD  -Hemoglobin acutely low on admission, report of dark stools concerning for GI bleed  -S/p transfusion of 2 units PRBCs, H&H dropped again this a.m.  -Monitor H&H and transfuse as needed if hemoglobin less than 7  -GI consulted for further evaluation for suspected GI blood loss    COVID-19 infection  -Patient is asymptomatic, not hypoxic at this time  -No specific treatment for Covid needed, continue supportive care, continue droplet isolation    Hypertensive urgency  -Now with low blood pressure, hold all antihypertensives  -Continue albumin, start low-dose midodrine for blood pressure support    End-stage renal disease  -Patient has missed hemodialysis over a week  -Nephrology following, received hemodialysis this a.m.  -No fluid removal due to low blood pressure    Hyponatremia  -Management per nephrology    Discharge disposition: In the next 24 to 48 hours pending work-up for anemia.     Signed By: Trever Carlin MD     January 27, 2022

## 2022-01-27 NOTE — PROGRESS NOTES
Consult Date: 1/27/2022      Subjective      Seen and examined. Received dialysis earlier today. Blood pressure was low on dialysis.         Past Medical History:   Diagnosis Date    Cancer ([facility]     esophageal (squamous cell carcinoma)    Chronic gastritis     Chronic kidney disease     dialysis tue, thur, sat    Dialysis patient ([facility]     GERD (gastroesophageal reflux disease)     Hypertension     Kidney damage       Past Surgical History:   Procedure Laterality Date    HX GI  07/23/2021    EGD    HX OTHER SURGICAL      kidney removed     Family History   Problem Relation Age of Onset    Hypertension Mother     Hypertension Father       Social History     Tobacco Use    Smoking status: Current Every Day Smoker     Packs/day: 0.33     Years: 10.00     Pack years: 3.30    Smokeless tobacco: Never Used   Substance Use Topics    Alcohol use: Yes       Current Facility-Administered Medications   Medication Dose Route Frequency Provider Last Rate Last Admin    sevelamer carbonate (RENVELA) tab 2,400 mg  2,400 mg Oral TID WITH MEALS Delfina KLEIN MD   2,400 mg at 01/26/22 1600    gabapentin (NEURONTIN) capsule 100 mg  100 mg Oral QHS Delfina KLEIN MD   100 mg at 01/26/22 2210    doxazosin (CARDURA) tablet 4 mg  4 mg Oral QHS Gunner Echevarria MD   4 mg at 01/26/22 2209    0.9% sodium chloride infusion 250 mL  250 mL IntraVENous PRN Hilary Guo MD 15 mL/hr at 01/24/22 1515 250 mL at 01/24/22 1515    epoetin anastasiia-epbx (RETACRIT) injection 10,000 Units  10,000 Units IntraVENous Q SHANKAR LAWRENCE & FEMI Frias MD   10,000 Units at 01/27/22 0931    0.9% sodium chloride infusion 250 mL  250 mL IntraVENous PRN Gunner Echevarria MD        0.9% sodium chloride infusion 250 mL  250 mL IntraVENous PRN Gunner Echevarria MD        sodium chloride (NS) flush 5-40 mL  5-40 mL IntraVENous Q8H Gunner Echevarria MD   10 mL at 01/27/22 0500    sodium chloride (NS) flush 5-40 mL  5-40 mL IntraVENous PRN Ruth Razo MD        acetaminophen (TYLENOL) tablet 650 mg  650 mg Oral Q6H PRN Ruth Razo MD   650 mg at 01/26/22 0354    Or    acetaminophen (TYLENOL) suppository 650 mg  650 mg Rectal Q6H PRN America Echevarria MD        polyethylene glycol (MIRALAX) packet 17 g  17 g Oral DAILY PRN America Echevarria MD        ondansetron (ZOFRAN ODT) tablet 4 mg  4 mg Oral Q8H PRN Gunner Echevarria MD   4 mg at 01/24/22 2035    Or    ondansetron (ZOFRAN) injection 4 mg  4 mg IntraVENous Q6H PRN America Echevarria MD        heparin (porcine) injection 5,000 Units  5,000 Units SubCUTAneous Q8H Gunner Echevarria MD   5,000 Units at 01/27/22 0500    [Held by provider] hydrALAZINE (APRESOLINE) tablet 100 mg  100 mg Oral TID Brenda BAUMAN MD   100 mg at 01/26/22 2208    [Held by provider] verapamiL (CALAN) tablet 120 mg  120 mg Oral BID Brenda BAUMAN MD   120 mg at 01/26/22 2208    sucralfate (CARAFATE) tablet 1 g  1 g Oral ACB&D Brenda BAUMAN MD   1 g at 01/26/22 1600    [Held by provider] metoprolol tartrate (LOPRESSOR) tablet 50 mg  50 mg Oral BID Gunner Echevarria MD   50 mg at 01/26/22 2210    [Held by provider] losartan (COZAAR) tablet 100 mg  100 mg Oral DAILY Gunner Echevarria MD   100 mg at 01/26/22 0940    pantoprazole (PROTONIX) tablet 40 mg  40 mg Oral DAILY Ruth Razo MD   40 mg at 01/26/22 0940        Review of Systems   Constitutional: Negative. Negative for chills, diaphoresis and fever. Respiratory: Negative for cough, chest tightness and shortness of breath. Cardiovascular: Negative for chest pain. Gastrointestinal: Negative for abdominal pain, diarrhea, nausea, rectal pain and vomiting. Genitourinary: Negative for dysuria. Skin: Negative for pallor. Neurological: Negative for light-headedness. Hematological: Negative. Psychiatric/Behavioral: Negative.         Objective     Vital signs for last 24 hours:  Visit Vitals  BP (!) 88/65 (BP Patient Position: Prone)   Pulse 72   Temp 97.2 °F (36.2 °C)   Resp 18   Ht 5' 5\" (1.651 m)   Wt 61.5 kg (135 lb 9.6 oz)   SpO2 100%   BMI 22.57 kg/m²       Intake/Output this shift:  Current Shift: No intake/output data recorded. Last 3 Shifts: No intake/output data recorded. Data Review:   Recent Results (from the past 24 hour(s))   CBC WITH AUTOMATED DIFF    Collection Time: 01/26/22  3:42 PM   Result Value Ref Range    WBC 4.7 4.4 - 11.3 K/uL    RBC 2.66 (L) 4.50 - 5.90 M/uL    HGB 8.3 (L) 13.5 - 17.5 g/dL    HCT 24.3 (L) 41 - 53 %    MCV 91.5 80 - 100 FL    MCH 31.3 31 - 34 PG    MCHC 34.2 31.0 - 36.0 g/dL    RDW 17.6 (H) 11.5 - 14.5 %    PLATELET 169 037 - 422 K/uL    MPV 8.7 6.5 - 11.5 FL    NRBC 0.4  WBC    ABSOLUTE NRBC 0.02 K/uL    NEUTROPHILS 84 (H) 42 - 75 %    LYMPHOCYTES 11 (L) 20.5 - 51.1 %    MONOCYTES 5 1.7 - 9.3 %    EOSINOPHILS 0 (L) 0.9 - 2.9 %    BASOPHILS 0 0.0 - 2.5 %    ABS. NEUTROPHILS 3.9 1.8 - 7.7 K/UL    ABS. LYMPHOCYTES 0.5 (L) 1.0 - 4.8 K/UL    ABS. MONOCYTES 0.2 0.2 - 2.4 K/UL    ABS. EOSINOPHILS 0.0 0.0 - 0.7 K/UL    ABS.  BASOPHILS 0.0 0.0 - 0.2 K/UL   METABOLIC PANEL, BASIC    Collection Time: 01/27/22  5:34 AM   Result Value Ref Range    Sodium 132 (L) 136 - 145 mmol/L    Potassium 4.4 3.5 - 5.1 mmol/L    Chloride 95 (L) 97 - 108 mmol/L    CO2 20 (L) 21 - 32 mmol/L    Anion gap 17 (H) 5 - 15 mmol/L    Glucose 81 65 - 100 mg/dL     (H) 6 - 20 mg/dL    Creatinine 11.54 (H) 0.70 - 1.30 mg/dL    BUN/Creatinine ratio 14 12 - 20      GFR est AA 6 (L) >60 ml/min/1.73m2    GFR est non-AA 5 (L) >60 ml/min/1.73m2    Calcium 7.3 (L) 8.5 - 10.1 mg/dL   CBC WITH AUTOMATED DIFF    Collection Time: 01/27/22  5:34 AM   Result Value Ref Range    WBC 3.7 (L) 4.4 - 11.3 K/uL    RBC 2.37 (L) 4.50 - 5.90 M/uL    HGB 7.5 (L) 13.5 - 17.5 g/dL    HCT 21.7 (L) 41 - 53 %    MCV 91.6 80 - 100 FL    MCH 31.5 31 - 34 PG    MCHC 34.4 31.0 - 36.0 g/dL    RDW 17.6 (H) 11.5 - 14.5 %    PLATELET 703 (L) 299 - 400 K/uL    MPV 9.1 6.5 - 11.5 FL    NRBC 0.2  WBC    ABSOLUTE NRBC 0.01 K/uL    NEUTROPHILS 86 (H) 42 - 75 %    LYMPHOCYTES 9 (L) 20.5 - 51.1 %    MONOCYTES 5 1.7 - 9.3 %    EOSINOPHILS 0 (L) 0.9 - 2.9 %    BASOPHILS 0 0.0 - 2.5 %    ABS. NEUTROPHILS 3.2 1.8 - 7.7 K/UL    ABS. LYMPHOCYTES 0.3 (L) 1.0 - 4.8 K/UL    ABS. MONOCYTES 0.2 0.2 - 2.4 K/UL    ABS. EOSINOPHILS 0.0 0.0 - 0.7 K/UL    ABS. BASOPHILS 0.0 0.0 - 0.2 K/UL       Physical Exam  Vitals reviewed. Constitutional:       Appearance: He is ill-appearing. HENT:      Head: Atraumatic. Mouth/Throat:      Mouth: Mucous membranes are dry. Pulmonary:      Effort: Pulmonary effort is normal. No respiratory distress. Breath sounds: No wheezing. Abdominal:      General: Abdomen is flat. Bowel sounds are normal.   Skin:     General: Skin is dry. Capillary Refill: Capillary refill takes less than 2 seconds. Neurological:      General: No focal deficit present. Mental Status: He is oriented to person, place, and time. Recent Results (from the past 24 hour(s))   CBC WITH AUTOMATED DIFF    Collection Time: 01/26/22  3:42 PM   Result Value Ref Range    WBC 4.7 4.4 - 11.3 K/uL    RBC 2.66 (L) 4.50 - 5.90 M/uL    HGB 8.3 (L) 13.5 - 17.5 g/dL    HCT 24.3 (L) 41 - 53 %    MCV 91.5 80 - 100 FL    MCH 31.3 31 - 34 PG    MCHC 34.2 31.0 - 36.0 g/dL    RDW 17.6 (H) 11.5 - 14.5 %    PLATELET 364 662 - 930 K/uL    MPV 8.7 6.5 - 11.5 FL    NRBC 0.4  WBC    ABSOLUTE NRBC 0.02 K/uL    NEUTROPHILS 84 (H) 42 - 75 %    LYMPHOCYTES 11 (L) 20.5 - 51.1 %    MONOCYTES 5 1.7 - 9.3 %    EOSINOPHILS 0 (L) 0.9 - 2.9 %    BASOPHILS 0 0.0 - 2.5 %    ABS. NEUTROPHILS 3.9 1.8 - 7.7 K/UL    ABS. LYMPHOCYTES 0.5 (L) 1.0 - 4.8 K/UL    ABS. MONOCYTES 0.2 0.2 - 2.4 K/UL    ABS. EOSINOPHILS 0.0 0.0 - 0.7 K/UL    ABS.  BASOPHILS 0.0 0.0 - 0.2 K/UL   METABOLIC PANEL, BASIC    Collection Time: 01/27/22  5:34 AM Result Value Ref Range    Sodium 132 (L) 136 - 145 mmol/L    Potassium 4.4 3.5 - 5.1 mmol/L    Chloride 95 (L) 97 - 108 mmol/L    CO2 20 (L) 21 - 32 mmol/L    Anion gap 17 (H) 5 - 15 mmol/L    Glucose 81 65 - 100 mg/dL     (H) 6 - 20 mg/dL    Creatinine 11.54 (H) 0.70 - 1.30 mg/dL    BUN/Creatinine ratio 14 12 - 20      GFR est AA 6 (L) >60 ml/min/1.73m2    GFR est non-AA 5 (L) >60 ml/min/1.73m2    Calcium 7.3 (L) 8.5 - 10.1 mg/dL   CBC WITH AUTOMATED DIFF    Collection Time: 01/27/22  5:34 AM   Result Value Ref Range    WBC 3.7 (L) 4.4 - 11.3 K/uL    RBC 2.37 (L) 4.50 - 5.90 M/uL    HGB 7.5 (L) 13.5 - 17.5 g/dL    HCT 21.7 (L) 41 - 53 %    MCV 91.6 80 - 100 FL    MCH 31.5 31 - 34 PG    MCHC 34.4 31.0 - 36.0 g/dL    RDW 17.6 (H) 11.5 - 14.5 %    PLATELET 282 (L) 990 - 400 K/uL    MPV 9.1 6.5 - 11.5 FL    NRBC 0.2  WBC    ABSOLUTE NRBC 0.01 K/uL    NEUTROPHILS 86 (H) 42 - 75 %    LYMPHOCYTES 9 (L) 20.5 - 51.1 %    MONOCYTES 5 1.7 - 9.3 %    EOSINOPHILS 0 (L) 0.9 - 2.9 %    BASOPHILS 0 0.0 - 2.5 %    ABS. NEUTROPHILS 3.2 1.8 - 7.7 K/UL    ABS. LYMPHOCYTES 0.3 (L) 1.0 - 4.8 K/UL    ABS. MONOCYTES 0.2 0.2 - 2.4 K/UL    ABS. EOSINOPHILS 0.0 0.0 - 0.7 K/UL    ABS.  BASOPHILS 0.0 0.0 - 0.2 K/UL         Current Facility-Administered Medications:     sevelamer carbonate (RENVELA) tab 2,400 mg, 2,400 mg, Oral, TID WITH MEALS, Johnny KLEIN MD, 2,400 mg at 01/26/22 1600    gabapentin (NEURONTIN) capsule 100 mg, 100 mg, Oral, QHS, Johnny KLEIN MD, 100 mg at 01/26/22 2210    doxazosin (CARDURA) tablet 4 mg, 4 mg, Oral, QHS, Gunner Echevarria MD, 4 mg at 01/26/22 2209    0.9% sodium chloride infusion 250 mL, 250 mL, IntraVENous, PRN, Hilary Guo MD, Last Rate: 15 mL/hr at 01/24/22 1515, 250 mL at 01/24/22 1515    epoetin anastasiia-epbx (RETACRIT) injection 10,000 Units, 10,000 Units, IntraVENous, Q TUE, THU & SAT, Susan Styles MD, 10,000 Units at 01/27/22 0931    0.9% sodium chloride infusion 250 mL, 250 mL, IntraVENous, PRN, Gunner Echevarria MD    0.9% sodium chloride infusion 250 mL, 250 mL, IntraVENous, PRN, Gunner Echevarria MD    sodium chloride (NS) flush 5-40 mL, 5-40 mL, IntraVENous, Q8H, Gunner Echevarria MD, 10 mL at 01/27/22 0500    sodium chloride (NS) flush 5-40 mL, 5-40 mL, IntraVENous, PRN, Itzel Echevarria MD    acetaminophen (TYLENOL) tablet 650 mg, 650 mg, Oral, Q6H PRN, 650 mg at 01/26/22 0354 **OR** acetaminophen (TYLENOL) suppository 650 mg, 650 mg, Rectal, Q6H PRN, Gunner Echevarria MD    polyethylene glycol (MIRALAX) packet 17 g, 17 g, Oral, DAILY PRN, Gunner Echevarria MD    ondansetron (ZOFRAN ODT) tablet 4 mg, 4 mg, Oral, Q8H PRN, 4 mg at 01/24/22 2035 **OR** ondansetron (ZOFRAN) injection 4 mg, 4 mg, IntraVENous, Q6H PRN, Gunner Echevarria MD    heparin (porcine) injection 5,000 Units, 5,000 Units, SubCUTAneous, Q8H, Gunner Echevarria MD, 5,000 Units at 01/27/22 0500    [Held by provider] hydrALAZINE (APRESOLINE) tablet 100 mg, 100 mg, Oral, TID, Gunner Echevarria MD, 100 mg at 01/26/22 2208    [Held by provider] verapamiL (CALAN) tablet 120 mg, 120 mg, Oral, BID, Gunner Echevarria MD, 120 mg at 01/26/22 2208    sucralfate (CARAFATE) tablet 1 g, 1 g, Oral, ACB&D, Gunner Echevarria MD, 1 g at 01/26/22 1600    [Held by provider] metoprolol tartrate (LOPRESSOR) tablet 50 mg, 50 mg, Oral, BID, Gunner Echevarria MD, 50 mg at 01/26/22 2210    [Held by provider] losartan (COZAAR) tablet 100 mg, 100 mg, Oral, DAILY, Gunner Echevarria MD, 100 mg at 01/26/22 0940    pantoprazole (PROTONIX) tablet 40 mg, 40 mg, Oral, DAILY, Gunner Echevarria MD, 40 mg at 01/26/22 0940    Assessment       Plan            1. ESRD on hemodialysis  2. Severe hyperkalemia due to missed dialysis treatmentresolved  3. Severe metabolic acidosis due to missed dialysis treatmentresolved  4. COVID-19 positive  5. Esophageal squamous cell carcinoma  6.  Severe anemia  7. Severe malnutrition  8. Hypertension  9. Severe GERD    Plan:  He tolerated dialysis well  Blood pressure was low but we supplemented albumin which helped him. Continue dialysis thrice weekly  Follow-up with GI  May need placement in cohort unit as he is Covid positive now       8.  SW/CM for disposition    Signed By: Yahaira Hart MD     January 27, 2022

## 2022-01-27 NOTE — PROGRESS NOTES
Problem: Falls - Risk of  Goal: *Absence of Falls  Description: Document Latesha Charles Fall Risk and appropriate interventions in the flowsheet.   Outcome: Progressing Towards Goal  Note: Fall Risk Interventions:            Medication Interventions: Teach patient to arise slowly         History of Falls Interventions: Bed/chair exit alarm

## 2022-01-27 NOTE — PROGRESS NOTES
Patients case reviewed during interdisciplinary team meeting in [address] [place], [place] [address]Acute Care Unit. Rev.  Flora Vallejo 63, [address]

## 2022-01-27 NOTE — CONSULTS
Gastroenterology Consult      Patient: Jaz Sarah MRN: [id number]  SSN: [id number]    YOB: 1980  Age: 35 y.o. Sex: male        Assessment:     1. Severe anemia  -Appears likely secondary to anemia of chronic disease from his kidney disease plus possible GI blood loss particular in light of the patient's esophageal cancer. . May be having blood loss from that site. 2.  Squamous cell carcinoma of esophagus  -Relatively recent diagnosis with placement of esophageal stent. May be contributing factor to this blood loss. Patient recently started radiation therapy to the area, states he stopped secondary to the symptomatic anemia    3. Stage renal diseasedialysis dependent    4. COVID-19 infection  -May be contributing to his symptoms    5. Hyperkalemia  -Treated    Plan:     1. Closely monitor H&H and stools of blood. Obtain stools for occult blood. 2.  Agree with transfusion of the packed RBCs for symptomatic anemia. 3.  This patient has a potential known source of bleeding at the site of the esophageal cancer. Ryne Rojas He should follow up with his service that inserted the stent at [facility]. He will also follow-up with radiation oncologist which he has missed a few courses of therapy. 4.  Probably does not need endoscopic examination here unless patient has significant acute bleeding. Bleeding from the site of the esophageal cancer is probably going to be chronic until adequately treated with radiation. Subjective:     Reason for consultation: Severe anemia    History: 47-year-old male with history of hypertension, end-stage renal disease dialysis dependent, squamous cell carcinoma of the esophagus,  post esophageal stent and radiation therapy, esophageal reflux disease, and chronic anemia who presented to the emergency room with severe anemia. Patient states he had become quite weak and nauseated. In the emergency room the patient was also found to be COVID-19 positive.   Patient states he was diagnosed with squamous cell carcinoma of the esophagus suddenly and had an esophageal stent placed at [facility]. He states he is swallowing okay with use of the stent. Patient states he still has nausea and vomiting. He has a poor appetite. Bowel movements have been loose particularly since he was given Kayexalate for an elevated potassium level on admission. He denies use of any NSAIDs. He states his weight has been up and down. Patient states that recently his stools have been very dark in color. No gross GI bleeding has been noted however.     Hospital Problems  Date Reviewed: 10/18/2021          Codes Class Noted POA    ESRD (end stage renal disease) ([facility] ICD-10-CM: N18.6  ICD-9-CM: 585.6  1/24/2022 Unknown        * (Principal) Symptomatic anemia ICD-10-CM: D64.9  ICD-9-CM: 285.9  2/5/2021 Unknown            Past Medical History:   Diagnosis Date    Cancer ([facility]     esophageal (squamous cell carcinoma)    Chronic gastritis     Chronic kidney disease     dialysis tue, thur, sat    Dialysis patient ([facility]     GERD (gastroesophageal reflux disease)     Hypertension     Kidney damage      Past Surgical History:   Procedure Laterality Date    HX GI  07/23/2021    EGD    HX OTHER SURGICAL      kidney removed      Family History   Problem Relation Age of Onset    Hypertension Mother     Hypertension Father      Social History     Tobacco Use    Smoking status: Current Every Day Smoker     Packs/day: 0.33     Years: 10.00     Pack years: 3.30    Smokeless tobacco: Never Used   Substance Use Topics    Alcohol use: Yes      Current Facility-Administered Medications   Medication Dose Route Frequency Provider Last Rate Last Admin    albumin human 25% (BUMINATE) solution 12.5 g  12.5 g IntraVENous Q6H Ronnie Arguelles MD        midodrine (PROAMATINE) tablet 5 mg  5 mg Oral TID WITH MEALS Ronnie Arguelles MD        sevelamer carbonate (RENVELA) tab 2,400 mg  2,400 mg Oral TID WITH MEALS Kalyani Brown MD   2,400 mg at 01/27/22 1247    gabapentin (NEURONTIN) capsule 100 mg  100 mg Oral QHS Princess KLEIN MD   100 mg at 01/26/22 2210    [Held by provider] doxazosin (CARDURA) tablet 4 mg  4 mg Oral QHS Gunner Echevarria MD   4 mg at 01/26/22 2209    0.9% sodium chloride infusion 250 mL  250 mL IntraVENous PRN Hilary Guo MD 15 mL/hr at 01/24/22 1515 250 mL at 01/24/22 1515    epoetin anastasiia-epbx (RETACRIT) injection 10,000 Units  10,000 Units IntraVENous Q NOHEMYE, AYAKAU & SAT Robin Matta MD   10,000 Units at 01/27/22 0931    0.9% sodium chloride infusion 250 mL  250 mL IntraVENous PRN Gunner Echevarria MD        0.9% sodium chloride infusion 250 mL  250 mL IntraVENous PRN Mariano David MD        sodium chloride (NS) flush 5-40 mL  5-40 mL IntraVENous Q8H Gunner Echevarria MD   10 mL at 01/27/22 1342    sodium chloride (NS) flush 5-40 mL  5-40 mL IntraVENous PRN Soraya Echevarria MD        acetaminophen (TYLENOL) tablet 650 mg  650 mg Oral Q6H PRN Mariano David MD   650 mg at 01/26/22 0354    Or    acetaminophen (TYLENOL) suppository 650 mg  650 mg Rectal Q6H PRN Gunner Echevarria MD        polyethylene glycol (MIRALAX) packet 17 g  17 g Oral DAILY PRN Gunner Echevarria MD        ondansetron (ZOFRAN ODT) tablet 4 mg  4 mg Oral Q8H PRN Gunner Echevarria MD   4 mg at 01/24/22 2035    Or    ondansetron (ZOFRAN) injection 4 mg  4 mg IntraVENous Q6H PRN Mariano David MD        [Held by provider] heparin (porcine) injection 5,000 Units  5,000 Units SubCUTAneous Q8H Gunner Echevarria MD   5,000 Units at 01/27/22 0500    [Held by provider] hydrALAZINE (APRESOLINE) tablet 100 mg  100 mg Oral TID Gary BAUMAN MD   100 mg at 01/26/22 2208    [Held by provider] verapamiL (CALAN) tablet 120 mg  120 mg Oral BID Gary BAUMAN MD   120 mg at 01/26/22 2208    sucralfate (CARAFATE) tablet 1 g  1 g Oral ACB&D Gary BAUMAN MD   1 g at 01/26/22 1600    [Held by provider] metoprolol tartrate (LOPRESSOR) tablet 50 mg  50 mg Oral BID Joe Echevarria MD   50 mg at 01/26/22 2210    [Held by provider] losartan (COZAAR) tablet 100 mg  100 mg Oral DAILY Gunner Echevarria MD   100 mg at 01/26/22 0940    pantoprazole (PROTONIX) tablet 40 mg  40 mg Oral DAILY Gunner Echevarria MD   40 mg at 01/27/22 1247        No Known Allergies    Review of Systems:  Constitutional: No recent weight change, fever,fatigue, or loss of appetite. ENT/Mouth: No hearing loss, nose bleeds, sore throat, voice change, hoarseness, or difficulties with chewing and swallowing. Cardiovascular: No chest pain, palpitations, swelling of feet/ankles/hands. Respiratory: No chronic or frequent coughs, spitting up blood, shortness of breath, asthma, or wheezing. Gastrointestinal: No abdominal pain, heartburn, , vomiting, constipation, rectal bleeding, or blood in stool. Patient did have nausea and recent diarrhea. Genitourinary: No frequent urination, burning or painful urination, blood in urine. Musculoskeletal:  No joint pain, stiffness/swelling, weakness of muscles, muscle pain/cramp, or back pain. Integument:  No rash/itching, change in skin color. Neurological: No dizziness/vertigo, loss of consciousness, numbness/tingling sensation, tremors, weakness in limbs, difficulty with balance, frequent or recurring headaches, memory loss or confusion. Psychiatric:  No nervousness, depression, hallucinations, paranoia or suspiciousness. Endocrine: No excessive thirst or urination, heat or cold intolerance. Hematologic/Lymphatic: No bleeding/bruising tendency, phlebitis, or past transfusion.       Objective:     Vitals:    01/27/22 1115 01/27/22 1130 01/27/22 1145 01/27/22 1206   BP: (!) 88/65 (!) 87/67 (!) 88/69 (!) 88/65   Pulse: 72 71 72 72   Resp: 18 18 18 18   Temp:    97.2 °F (36.2 °C)   TempSrc:       SpO2:    100%   Weight:       Height:            Physical Exam:  General: Alert, cooperative, no distress. Head:  Normocephalic, without obvious abnormality, atraumatic. Eyes:  Conjunctivae/corneas clear. Pupils equal, round, reactive to light. Extraocular movements intact. Lungs:  Clear to auscultation bilaterally. Chest wall: No tenderness or deformity. Heart:  Regular rate and rhythm, S1, S2 normal, no murmur, click, rub, or gallop. Abdomen:  Soft, non-tender. Bowel sounds normal. No masses. No organomegaly. Extremities: Extremities normal, atraumatic, no cyanosis or edema. Dialysis graft in left forearm. Pulses: 2+ and symmetric all extremities. Skin: Skin color, texture, turgor normal. No rashes or lesions. Lymph nodes: Cervical, supraclavicular, and axillary nodes normal.  Neurologic: CNII-XII intact. Normal strength, sensation, and reflexes throughout. CBC w/Diff Recent Labs     01/27/22  0534 01/26/22  1542 01/25/22  1445   WBC 3.7* 4.7 5.3   RBC 2.37* 2.66* 2.17*   HGB 7.5* 8.3* 6.9*   HCT 21.7* 24.3* 19.6*   * 159 240   GRANS 86* 84* 88*   LYMPH 9* 11* 8*   EOS 0* 0* 0*        Chemistry Recent Labs     01/27/22  0534 01/26/22  1006 01/25/22  1445 01/25/22  0613   GLU 81 80 102*  --    * 132* 133*  --    K 4.4 4.4 5.0  --    CL 95* 96* 96*  --    CO2 20* 23 18*  --    * 126* 161*  --    CREA 11.54* 10.96* 15.32*  --    CA 7.3* 7.5* 8.0*  --    PHOS  --   --   --  10.6*   AGAP 17* 13 19*  --    BUCR 14 11* 11*  --         Lactic Acid No results found for: LAC  No results for input(s): LAC in the last 72 hours. Micro  No results for input(s): SDES, CULT in the last 72 hours. No results for input(s): CULT in the last 72 hours.      Liver Enzymes Protein, total   Date Value Ref Range Status   01/24/2022 4.4 (L) 6.4 - 8.2 g/dL Final     Albumin   Date Value Ref Range Status   01/24/2022 1.8 (L) 3.5 - 5.0 g/dL Final     Globulin   Date Value Ref Range Status   01/24/2022 2.6 2.0 - 4.0 g/dL Final     A-G Ratio   Date Value Ref Range Status   01/24/2022 0.7 (L) 1.1 - 2.2   Final     Alk. phosphatase   Date Value Ref Range Status   01/24/2022 44 (L) 45 - 117 U/L Final     No results for input(s): TP, ALB, GLOB, AGRAT, AP, TBIL in the last 72 hours. No lab exists for component: SGOT, GPT, DBIL       Cardiac Enzymes No results found for: CPK, CK, CKMMB, CKMB, RCK3, CKMBT, CKNDX, CKND1, SAROJ, TROPT, TROIQ, IMELDA, TROPT, TNIPOC, BNP, BNPP     BNP No results found for: BNP, BNPP, XBNPT     Coagulation No results for input(s): PTP, INR, APTT, INREXT in the last 72 hours. Thyroid  Lab Results   Component Value Date/Time    TSH 1.70 03/01/2014 04:15 AM          Lipid Panel No results found for: CHOL, CHOLPOCT, CHOLX, CHLST, CHOLV, 672792, HDL, HDLP, LDL, LDLC, DLDLP, 672045, VLDLC, VLDL, TGLX, TRIGL, TRIGP, TGLPOCT, CHHD, CHHDX     Urinalysis Lab Results   Component Value Date/Time    Color YELLOW/STRAW 03/03/2014 12:00 AM    Appearance CLEAR 03/03/2014 12:00 AM    Specific gravity 1.019 03/03/2014 12:00 AM    pH (UA) 6.5 03/03/2014 12:00 AM    Protein 30 (A) 03/03/2014 12:00 AM    Glucose NEGATIVE  03/03/2014 12:00 AM    Ketone NEGATIVE  03/03/2014 12:00 AM    Bilirubin NEGATIVE  03/03/2014 12:00 AM    Urobilinogen 0.2 03/03/2014 12:00 AM    Nitrites NEGATIVE  03/03/2014 12:00 AM    Leukocyte Esterase NEGATIVE  03/03/2014 12:00 AM    Epithelial cells FEW 03/03/2014 12:00 AM    Bacteria NEGATIVE  03/03/2014 12:00 AM    WBC 0-4 03/03/2014 12:00 AM    RBC 0-5 03/03/2014 12:00 AM        XR (Most Recent). CXR reviewed by me and compared with previous CXR Results from Hospital Encounter encounter on 01/24/22    XR CHEST PORT    Narrative  Study: XR CHEST PORT    Clinical indication: evaluate covid 19    Comparison: Chest x-ray 7/21/2021    Impression  Findings/impression:    Cardiac silhouette is enlarged. Central vascular congestion. Heterogeneous left lower lobe airspace disease and left pleural effusion. No  evidence of pneumothorax.     Distal esophageal stent noted.    No acute osseous abnormality identified. CT (Most Recent) No results found for this or any previous visit.              Anahy Martin MD  1/27/2022  4:57 PM.

## 2022-01-27 NOTE — PROGRESS NOTES
Hemodialysis / [phone]    Vitals Pre Post Assessment Pre Post   BP BP: 92/70 (01/27/22 0815) 88/69 LOC ALERT AND ORIENTED *4 No change   HR Pulse (Heart Rate): 66 (01/27/22 0815) 72 Lungs CRACKLES No change   Resp Resp Rate: 16 (01/27/22 0815) 18 Cardiac REGUJLAR No change   Temp Temp: 98.2 °F (36.8 °C) (01/27/22 0815) 97.6 Skin WARM DRY AND INTACT No change   Weight    Edema IN FACIAL AREA  No change   Tele status   Pain Pain Intensity 1: 0 (01/27/22 0713)      Orders   Duration: Start: 7087 End: 7004 Total: 3.5 hours   Dialyzer: Dialyzer/Set Up Inspection: Charisma Freeman (01/27/22 0815)   K Bath: Dialysate K (mEq/L): 2 (01/27/22 0815)   Ca Bath: Dialysate CA (mEq/L): 2.5 (01/27/22 0815)   Na: Dialysate NA (mEq/L): 138 (01/27/22 0815)   Bicarb: Dialysate HCO3 (mEq/L): 35 (01/27/22 0815)   Target Fluid Removal: Goal/Amount of Fluid to Remove (mL): 2000 mL (01/27/22 0815)     Access   Type & Location: DEMAR AVF: skin CDI. No s/s of infection. + B/T. No issues with cannulation or hemostasis. Running well at .    Comments:                                        Labs   HBsAg (Antigen) / date:    Negative as of 1/25/22                                           HBsAb (Antibody) / date: Immune as of 1/25/22   Source:    Obtained/Reviewed  Critical Results Called HGB   Date Value Ref Range Status   01/26/2022 8.3 (L) 13.5 - 17.5 g/dL Final     Potassium   Date Value Ref Range Status   01/27/2022 4.4 3.5 - 5.1 mmol/L Final     Calcium   Date Value Ref Range Status   01/27/2022 7.3 (L) 8.5 - 10.1 mg/dL Final     BUN   Date Value Ref Range Status   01/27/2022 159 (H) 6 - 20 mg/dL Final     Creatinine   Date Value Ref Range Status   01/27/2022 11.54 (H) 0.70 - 1.30 mg/dL Final        Meds Given   Name Dose Route                    Adequacy / Fluid    Total Liters Process: 74.3   Net Fluid Removed: 0 ml      Comments   Time Out Done:   (0800)    Admitting Diagnosis: covid +   Consent obtained/signed: Informed Consent Verified: Yes (01/27/22 0815)   Machine / RO # Machine Number: [id number] (01/27/22 0815)   Primary Nurse Rpt Pre: Maria Del Carmen Vega   Primary Nurse Rpt Post: [email or web address]   Pt Education: YES   Care Plan: CONTINUE HD   Pts outpatient clinic:      Tx Summary        Consent signed & on file. SBAR received from Primary RN. 0815: Pt cannulated with 81K needles per policy & without issue. Labs drawn per request/ order. VSS. Dialysis Tx initiated. 0900: bp low reduced uf rate  0915 bp remains low md notified. Albumin ordered per md. Continue hd per md.    7016: Tx ended. VSS. All possible blood returned to patient. Hemostasis achieved without issue. Bed locked and in the lowest position, call bell and belongings in reach. SBAR given to Primary, RN. Patient is stable at time of their/ my departure. All Dialysis related medications have been reviewed.       Comments:

## 2022-01-28 NOTE — PROGRESS NOTES
Unable to obtain oral or axillary temp, temporal temp 95.4, pt refusing rectal. Pt wrapped in warm blankets while obtaining josie wiggins. MD aware.

## 2022-01-28 NOTE — PROGRESS NOTES
Patient refusing to go to CTA despite explaining to him that this is a necessity test to evaluate his bleeding. I have messaged patient's thoracic surgeon Dr. Mitchell Jain regarding patient's refusal of CTA.

## 2022-01-28 NOTE — PROGRESS NOTES
Progress Note    Patient: Kimmy Cornelius MRN: [id number]  SSN: [id number]    YOB: 1980  Age: 35 y.o. Sex: male      Admit Date: 1/24/2022    LOS: 4 days     Subjective:     Patient presents with anemia. Has frequent anemia, previously thought to be due to end-stage renal disease. However patient on further questioning, reports some dark stools. Patient's blood pressure is improving today. Objective:     Vitals:    01/28/22 0349 01/28/22 0524 01/28/22 0752 01/28/22 0849   BP: (!) 140/108   130/70   Pulse:    64   Resp: 18   18   Temp: (!) 96.6 °F (35.9 °C)   96.8 °F (36 °C)   TempSrc:       SpO2: 93% 93% 94% 94%   Weight:       Height:            Intake and Output:  Current Shift: No intake/output data recorded. Last three shifts: 01/26 1901 - 01/28 0700  In: 300 [P.O.:240; I.V.:50]  Out: 1     Physical Exam:   GENERAL: Alert  THROAT & NECK: normal and no erythema or exudates noted. LUNG: clear to auscultation bilaterally  HEART: regular rate and rhythm, S1, S2 normal, no murmur, click, rub or gallop  ABDOMEN: soft, non-tender. Bowel sounds normal. No masses,  no organomegaly  EXTREMITIES:  extremities normal, atraumatic, no cyanosis or edema  SKIN: no rash or abnormalities  NEUROLOGIC: AOx3. PSYCHIATRIC: non focal    Lab/Data Review: All lab results for the last 24 hours reviewed.      Recent Results (from the past 24 hour(s))   METABOLIC PANEL, BASIC    Collection Time: 01/28/22  5:52 AM   Result Value Ref Range    Sodium 136 136 - 145 mmol/L    Potassium 3.5 3.5 - 5.1 mmol/L    Chloride 97 97 - 108 mmol/L    CO2 21 21 - 32 mmol/L    Anion gap 18 (H) 5 - 15 mmol/L    Glucose 62 (L) 65 - 100 mg/dL     (H) 6 - 20 mg/dL    Creatinine 7.44 (H) 0.70 - 1.30 mg/dL    BUN/Creatinine ratio 15 12 - 20      GFR est AA 10 (L) >60 ml/min/1.73m2    GFR est non-AA 8 (L) >60 ml/min/1.73m2    Calcium 7.6 (L) 8.5 - 10.1 mg/dL   CBC WITH AUTOMATED DIFF    Collection Time: 01/28/22  5:52 AM   Result Value Ref Range    WBC 2.4 (L) 4.4 - 11.3 K/uL    RBC 2.84 (L) 4.50 - 5.90 M/uL    HGB 9.0 (L) 13.5 - 17.5 g/dL    HCT 26.1 (L) 41 - 53 %    MCV 92.1 80 - 100 FL    MCH 31.8 31 - 34 PG    MCHC 34.6 31.0 - 36.0 g/dL    RDW 17.3 (H) 11.5 - 14.5 %    PLATELET 76 (L) 304 - 400 K/uL    MPV 9.8 6.5 - 11.5 FL    NRBC 0.6  WBC    ABSOLUTE NRBC 0.01 K/uL    NEUTROPHILS 83 (H) 42 - 75 %    LYMPHOCYTES 10 (L) 20.5 - 51.1 %    MONOCYTES 7 1.7 - 9.3 %    EOSINOPHILS 0 (L) 0.9 - 2.9 %    BASOPHILS 0 0.0 - 2.5 %    ABS. NEUTROPHILS 2.0 1.8 - 7.7 K/UL    ABS. LYMPHOCYTES 0.2 (L) 1.0 - 4.8 K/UL    ABS. MONOCYTES 0.2 0.2 - 2.4 K/UL    ABS. EOSINOPHILS 0.0 0.0 - 0.7 K/UL    ABS. BASOPHILS 0.0 0.0 - 0.2 K/UL        Imaging:    No results found. Assessment and Plan:     Acute on chronic anemia  -Patient with chronic anemia due to CKD  -Hemoglobin acutely low on admission, report of dark stools concerning for GI bleed  -S/p transfusion of 3 units PRBCs, hemoglobin is 9 today  -Monitor H&H and transfuse as needed if hemoglobin less than 7  -GI evaluated the patient, patient with history of esophageal cancer s/p stent placement in November 2021 per Dr Harriet Granados at [facility].  There was concern of bleeding from his esophageal cancer, and GI recommended follow-up with Solitario Cabrera, [phone], he recommended to get CTA of the chest to evaluate for bleeding fistula  -CTA chest ordered, pending at this time    History of esophageal cancer  -Discussed with patient's thoracic surgeon Dr. Harriet Granados, plan as above  -Also patient's radiation oncology clinic has contacted me this a.m., patient has been noncompliant and has missed radiation appointments    COVID-19 infection  -Patient is asymptomatic, not hypoxic at this time  -No specific treatment for Covid needed, continue supportive care, continue droplet isolation    Hypertensive urgency  -Now with low blood pressure, hold all antihypertensives  -Continue albumin, start low-dose midodrine for blood pressure support    End-stage renal disease  -Patient has missed hemodialysis over a week  -Nephrology following, received hemodialysis this a.m.  -No fluid removal due to low blood pressure    Hyponatremia  -Management per nephrology    Discharge disposition: In the next 24 to 48 hours pending work-up for anemia.     Signed By: Fransisco AsifutaMD iliana     January 28, 2022

## 2022-01-28 NOTE — PROGRESS NOTES
Patients case reviewed during interdisciplinary team meeting in [address] [place] [address]/Acute Care Unit. Rev.  Janice Males, [address], [address]

## 2022-01-28 NOTE — ROUTINE PROCESS
Oral temp wont register, pt still refusing rectal temp. Temporal temp 95.6 [place] hugger remains in place.

## 2022-01-28 NOTE — PROGRESS NOTES
Care Management Interventions  PCP Verified by CM: Yes Vera De León NP)  Last Visit to PCP: 08/25/21  Mode of Transport at Discharge: Other (see comment) (Girlfriend)  Transition of Care Consult (CM Consult): Discharge Planning  Support Systems: Spouse/Significant Other,Other Family Member(s)  Confirm Follow Up Transport: Family  The Plan for Transition of Care is Related to the Following Treatment Goals : Discharge plan to be determined. Provider discussed possible transfer today.   Discharge Location  Patient Expects to be Discharged to[de-identified] Unable to determine at this time

## 2022-01-28 NOTE — ACP (ADVANCE CARE PLANNING)
Advance Care Planning   Healthcare Decision Maker:       Primary Decision Maker: Negar Tripathi - [phone]    Click here to complete [address] including selection of the Healthcare Decision Maker Relationship (ie \"Primary\")

## 2022-01-28 NOTE — ROUTINE PROCESS
Patient refused to got to CT, all indications for the test have explained to him and he still refuses. Dr. Danis Skinner also explained the importance of the test and he still refuses.

## 2022-01-29 NOTE — PROGRESS NOTES
Discussed with the patient about the need for CTA chest to further evaluate bleeding. Hg was 9 on 1/28 and down to 7. 7. after extensive discussion patient willing to get the test done. He states he has been having off ad on chest pain right sided and appears reproducible. ekg does not show any acute ischemic changes. Troponin drawn and pending at this time. Will reorder CTA of chest to evaluate esophageal stent and possible look for possible bleeding fisutula.

## 2022-01-29 NOTE — PROGRESS NOTES
Problem: Airway Clearance - Ineffective  Goal: Achieve or maintain patent airway  Outcome: Progressing Towards Goal     Problem: Gas Exchange - Impaired  Goal: Absence of hypoxia  Outcome: Progressing Towards Goal  Goal: Promote optimal lung function  Outcome: Progressing Towards Goal     Problem: Breathing Pattern - Ineffective  Goal: Ability to achieve and maintain a regular respiratory rate  Outcome: Progressing Towards Goal     Problem: Body Temperature -  Risk of, Imbalanced  Goal: Ability to maintain a body temperature within defined limits  Outcome: Progressing Towards Goal     Problem: Falls - Risk of  Goal: *Absence of Falls  Description: Document [place] Fall Risk and appropriate interventions in the flowsheet.   Outcome: Progressing Towards Goal  Note: Fall Risk Interventions:            Medication Interventions: Patient to call before getting OOB         History of Falls Interventions: Bed/chair exit alarm

## 2022-01-29 NOTE — ROUTINE PROCESS
Family left. [facility] contacted. Security aware. Supervisor notified.

## 2022-01-29 NOTE — ROUTINE PROCESS
C/o pain all over and hurting in chest VS taken, EKG done. Dr. Aliyah Auguste in to examine. Pt sweating and extra blankets removed.

## 2022-01-29 NOTE — DIALYSIS
Approx 1500, patient assessed. Unresponsive and cold to touch. No pulse palpable. Code called and compressions started until code team arrived.

## 2022-01-29 NOTE — DISCHARGE SUMMARY
Physician Discharge Summary       Patient: Mart Swenson MRN: [id number]     YOB: 1980  Age: 35 y.o. Sex: male    PCP: None    Allergies: Patient has no known allergies. Admit date: 1/24/2022  Admitting Provider: Alfredo Byrnes MD    Discharge date: 1/29/2022  Discharging Provider: Alfredo Byrnes MD    * Admission Diagnoses:   Symptomatic anemia [D64.9]  ESRD (end stage renal disease) ([facility] [N18.6]      * Discharge Diagnoses:    Hospital Problems as of 1/29/2022 Date Reviewed: 10/18/2021          Codes Class Noted - Resolved POA    ESRD (end stage renal disease) ([facility] ICD-10-CM: N18.6  ICD-9-CM: 585.6  1/24/2022 - Present Unknown        * (Principal) Symptomatic anemia ICD-10-CM: D64.9  ICD-9-CM: 285.9  2/5/2021 - Present Unknown                * Hospital Course:   Mart Swenson is a 35 y.o. male followed by None and  has a past medical history of Cancer ([facility] Chronic gastritis, Chronic kidney disease, Dialysis patient ([facility] GERD (gastroesophageal reflux disease), Hypertension, and Kidney damage. Presents with increasing shortness of breath and weakness. Patient was here on the 19th and was was again 2 units of blood patient ended up signing out [address] Patient still has not had dialysis in over a week does have noted swelling around the eyelids and lower extremities good O2 saturation on room. Hemoglobin still low at 5.9 and denies any sick contacts noted on testing was found to be COVID-19 positive. She also has labs that show potassium 6.1 BUN of 138 creatinine 14.62.   Patient was referred for admission for symptomatic anemia end-stage renal disease and COVID-19    Acute on chronic anemia  -Patient with chronic anemia due to CKD  -Hemoglobin acutely low on admission, report of dark stools concerning for GI bleed  -S/p transfusion of 3 units PRBCs, hemoglobin is 9 today  -Monitor H&H and transfuse as needed if hemoglobin less than 7  -GI evaluated the patient, patient with history of esophageal cancer s/p stent placement in November 2021 per Dr Nirmal Rasheed at [facility]. There was concern of bleeding from his esophageal cancer, and GI recommended follow-up with Jalen Israel, [phone], he recommended to get CTA of the chest to evaluate for bleeding fistula  -CTA chest ordered, and initially refused on 1/28 and was able to be obtained on 1/29 which showed no evidence of PE, dilated descending thoracic aorta partially imaged distal esophageal stent moderately large pericardial effusion and diffuse upper abdominal ascites.   -After returning from [place] dialysis nurse arrived to do scheduled dialysis as ordered by nephrology and it was then noted patient was pulseless and unresponsive and so CODE BLUE was called ACLS was started and compressions and medications given epinephrine but with repeat evaluations no rhythm was noted after 15 minutes of attempted resuscitation patient continued to remain pulseless and time of death noted at 3:17pm.  Did call phone number in chart a family friend who notified patient's mother who I did not talk with at bedside and answered any questions they may have had and described patient was prior to the events and how the event transpired.     History of esophageal cancer  -Discussed with patient's thoracic surgeon Dr. Nirmal Rasheed, plan as above  -Also patient's radiation oncology clinic has contacted me this a.m., patient has been noncompliant and has missed radiation appointments     COVID-19 infection  -Patient is asymptomatic, not hypoxic at this time  -No specific treatment for Covid needed, continue supportive care, continue droplet isolation     Hypertensive urgency  -Now with low blood pressure, hold all antihypertensives  -Continue albumin, start low-dose midodrine for blood pressure support     End-stage renal disease  -Patient has missed hemodialysis over a week  -Nephrology following, received hemodialysis this a.m.  -No fluid removal due to low blood pressure     Hyponatremia  -Management per nephrology    * Procedures:   * No surgery found *        Consults:   Nephrology       Vitals Last 24 Hours:  Patient Vitals for the past 24 hrs:   Temp Pulse Resp BP SpO2   01/29/22 1228 97.4 °F (36.3 °C) 79 16 (!) 148/77 97 %   01/29/22 0721 97.5 °F (36.4 °C) 77 18 (!) 141/89 96 %   01/29/22 0428 97.3 °F (36.3 °C) 71 16 (!) 140/97 95 %   01/29/22 0004 97.2 °F (36.2 °C) 76 16 (!) 140/103 97 %   01/28/22 2020 97.7 °F (36.5 °C) 87 16 121/86 (!) 88 %        Discharge Exam:    Labs:  Recent Results (from the past 24 hour(s))   METABOLIC PANEL, BASIC    Collection Time: 01/29/22  5:50 AM   Result Value Ref Range    Sodium 132 (L) 136 - 145 mmol/L    Potassium 3.2 (L) 3.5 - 5.1 mmol/L    Chloride 95 (L) 97 - 108 mmol/L    CO2 20 (L) 21 - 32 mmol/L    Anion gap 17 (H) 5 - 15 mmol/L    Glucose 55 (L) 65 - 100 mg/dL     (H) 6 - 20 mg/dL    Creatinine 8.01 (H) 0.70 - 1.30 mg/dL    BUN/Creatinine ratio 18 12 - 20      GFR est AA 9 (L) >60 ml/min/1.73m2    GFR est non-AA 7 (L) >60 ml/min/1.73m2    Calcium 7.1 (L) 8.5 - 10.1 mg/dL   CBC WITH AUTOMATED DIFF    Collection Time: 01/29/22  5:50 AM   Result Value Ref Range    WBC 2.9 (L) 4.4 - 11.3 K/uL    RBC 2.40 (L) 4.50 - 5.90 M/uL    HGB 7.7 (L) 13.5 - 17.5 g/dL    HCT 22.1 (L) 41 - 53 %    MCV 92.0 80 - 100 FL    MCH 32.0 31 - 34 PG    MCHC 34.8 31.0 - 36.0 g/dL    RDW 17.3 (H) 11.5 - 14.5 %    PLATELET 97 (L) 535 - 400 K/uL    MPV 9.4 6.5 - 11.5 FL    NRBC 0.2  WBC    ABSOLUTE NRBC 0.01 K/uL    NEUTROPHILS 83 (H) 42 - 75 %    LYMPHOCYTES 9 (L) 20.5 - 51.1 %    MONOCYTES 7 1.7 - 9.3 %    EOSINOPHILS 0 (L) 0.9 - 2.9 %    BASOPHILS 1 0.0 - 2.5 %    ABS. NEUTROPHILS 2.4 1.8 - 7.7 K/UL    ABS. LYMPHOCYTES 0.2 (L) 1.0 - 4.8 K/UL    ABS. MONOCYTES 0.2 0.2 - 2.4 K/UL    ABS. EOSINOPHILS 0.0 0.0 - 0.7 K/UL    ABS.  BASOPHILS 0.0 0.0 - 0.2 K/UL   TROPONIN-HIGH SENSITIVITY    Collection Time: 01/29/22  8:50 AM   Result Value Ref Range    Troponin-High Sensitivity 52 0 - 76 ng/L         Imaging:  CTA CHEST W OR W WO CONT    Result Date: 2022  1. No evidence of acute pulmonary embolus. 2. Dilated descending thoracic aorta. 3. Partially imaged distal esophageal stent. 4. Extensive bilateral airspace disease, consistent with pulmonary edema and/or pneumonia. 5. Small bilateral pleural effusions. 6. Moderately large pericardial effusion. 7. Diffuse upper abdominal ascites. XR CHEST PORT    Result Date: 2022  Findings/impression: Cardiac silhouette is enlarged. Central vascular congestion. Heterogeneous left lower lobe airspace disease and left pleural effusion. No evidence of pneumothorax. Distal esophageal stent noted. No acute osseous abnormality identified. * Discharge Condition:    * Disposition:  home of family choosing       Spent 45 minutes evaluting and coordinating patient care as well  of which >50% was spent coordinating and counseling.          Signed:  Lupe Keyes MD  2022  5:22 PM

## 2022-01-31 LAB
ATRIAL RATE: 84 BPM
CALCULATED P AXIS, ECG09: 29 DEGREES
CALCULATED R AXIS, ECG10: -25 DEGREES
CALCULATED T AXIS, ECG11: 122 DEGREES
DIAGNOSIS, 93000: NORMAL
P-R INTERVAL, ECG05: 159 MS
Q-T INTERVAL, ECG07: 411 MS
QRS DURATION, ECG06: 101 MS
QTC CALCULATION (BEZET), ECG08: 486 MS
VENTRICULAR RATE, ECG03: 84 BPM

## 2022-01-31 NOTE — PROGRESS NOTES
[employer] responded to call.  provided emotional and spiritual support and guidance to family and staff at the time of death, words of comfort and care, and prayer. Advised of  availability. Rev.  Flora Edmondson 22, [address]

## 2022-02-09 NOTE — PROGRESS NOTES
Physician Progress Note      PATIENT:               Rachael Bradford  CSN #:                  [id number]  :                       1980  ADMIT DATE:       2022 10:34 AM  DISCH DATE:        2022 3:17 PM  RESPONDING  PROVIDER #:        Valentín Holley MD          QUERY TEXT:    Dear Dr. Aliyah Auguste -    Pt admitted with anemia, ESRD. Pt noted to have hypoxia requiring supplemental 02. If possible, please document in the progress notes and discharge summary if you are evaluating and/or treating any of the following: The medical record reflects the following:  Risk Factors: 35 M presented with weakness, anemia, missed hemodialysis  Clinical Indicators: on , patient placed on  via NC, sats noted to be 93-94% on  2lpm NC; on the evening of , sats documented as 88%; CT chest on  notes \"Extensive bilateral airspace disease, consistent with pulmonary edema and/or  pneumonia. Small bilateral pleural effusions. Moderately large pericardial effusion. \"  Treatment: labs, CTA chest, supplemental 02    Thank you,  YULI StarrN, RN, CRCR  Clinical   [email or web address] or contact via [employer]  Options provided:  -- Acute respiratory failure with hypoxia  -- Acute respiratory failure with hypercapnia  -- Acute respiratory failure with hypoxia and hypercapnia  -- Other - I will add my own diagnosis  -- Disagree - Not applicable / Not valid  -- Disagree - Clinically unable to determine / Unknown  -- Refer to Clinical Documentation Reviewer    PROVIDER RESPONSE TEXT:    This patient is in acute respiratory failure with hypoxia. Query created by: Bindu Fernandes on 2022 5:20 AM      QUERY TEXT:    Dear Dr. Aliyah Auguste -    Pt admitted with anemia, ESRD. Pt noted to have Covid. Patient noted with hypothermia, leukopenia, hypotension.  If possible, please document in the progress notes and discharge summary if you are evaluating and /or treating any of the following: The medical record reflects the following:  Risk Factors: 35 M presented with weakness and anemia, missed hemodialysis  Clinical Indicators: Covid PNA - asymptomatic per documentation; 1/28/22 WBC 2.4. ..2.9; 1/28/22 temp 95.4. .. 96.6; 1/27/22 BPs 80/56. .. 80/53. ..77/57. ..78/62. ..88/64; CT chest on 1/29/22 notes \"Extensive bilateral airspace disease, consistent with pulmonary edema and/or pneumonia. \"  Treatment: labs, CTA chest, 02 via NC    Thank you,  YULI OliverN, RN, CRCR  Clinical   [email or web address] or contact via [employer]  Options provided:  -- Sepsis, present on admission  -- Sepsis, present on admission now resolved  -- Sepsis, not present on admission  -- Sepsis was ruled out  -- Other - I will add my own diagnosis  -- Disagree - Not applicable / Not valid  -- Disagree - Clinically unable to determine / Unknown  -- Refer to Clinical Documentation Reviewer    PROVIDER RESPONSE TEXT:    This patient has sepsis which was present on admission.     Query created by: Adryan Hollins on 2/9/2022 5:21 AM      Electronically signed by:  Michell Mendoza MD 2/9/2022 7:04 AM

## 2023-08-02 NOTE — PROGRESS NOTES
MELISSA reviewed and signed by patient. Copy given. Quality 130: Documentation Of Current Medications In The Medical Record: Current Medications Documented Quality 431: Preventive Care And Screening: Unhealthy Alcohol Use - Screening: Patient not identified as an unhealthy alcohol user when screened for unhealthy alcohol use using a systematic screening method Quality 226: Preventive Care And Screening: Tobacco Use: Screening And Cessation Intervention: Patient screened for tobacco use and is an ex/non-smoker Detail Level: Zone

## (undated) DEVICE — CANNULA NSL O2 AD 7 FT END-TIDAL CARBON DIOX VENTFLO

## (undated) DEVICE — MOUTHPIECE ENDOSCP 20X27MM --

## (undated) DEVICE — ENDO KIT 1: Brand: MEDLINE INDUSTRIES, INC.

## (undated) DEVICE — Device: Brand: BALLOON3

## (undated) DEVICE — THE ENDO CARRY-ON PROCEDURE KIT CONTAINS ALL OF THE SUPPLIES AND INFECTION PREVENTION PRODUCTS NEEDED FOR ENDOSCOPIC PROCEDURES: Brand: ENDO CARRY-ON PROCEDURE KIT